# Patient Record
Sex: FEMALE | Race: WHITE | NOT HISPANIC OR LATINO | Employment: OTHER | URBAN - NONMETROPOLITAN AREA
[De-identification: names, ages, dates, MRNs, and addresses within clinical notes are randomized per-mention and may not be internally consistent; named-entity substitution may affect disease eponyms.]

---

## 2017-02-02 RX ORDER — TRAMADOL HYDROCHLORIDE 50 MG/1
50 TABLET ORAL 4 TIMES DAILY
Qty: 120 TABLET | Refills: 0 | Status: SHIPPED | OUTPATIENT
Start: 2017-02-02 | End: 2017-03-23 | Stop reason: SDUPTHER

## 2017-02-02 RX ORDER — ACETAMINOPHEN AND CODEINE PHOSPHATE 300; 30 MG/1; MG/1
1 TABLET ORAL 2 TIMES DAILY PRN
Qty: 60 TABLET | Refills: 0 | Status: SHIPPED | OUTPATIENT
Start: 2017-02-02 | End: 2017-03-23 | Stop reason: SDUPTHER

## 2017-02-06 RX ORDER — ESTRADIOL 0.5 MG/1
TABLET ORAL
Qty: 90 TABLET | Refills: 2 | Status: SHIPPED | OUTPATIENT
Start: 2017-02-06 | End: 2020-03-19

## 2017-02-06 RX ORDER — MONTELUKAST SODIUM 10 MG/1
TABLET ORAL
Qty: 90 TABLET | Refills: 2 | Status: SHIPPED | OUTPATIENT
Start: 2017-02-06 | End: 2017-09-29 | Stop reason: SDUPTHER

## 2017-02-06 RX ORDER — VALSARTAN AND HYDROCHLOROTHIAZIDE 160; 25 MG/1; MG/1
TABLET ORAL
Qty: 90 TABLET | Refills: 2 | Status: SHIPPED | OUTPATIENT
Start: 2017-02-06 | End: 2017-09-29 | Stop reason: SDUPTHER

## 2017-02-06 RX ORDER — ESOMEPRAZOLE MAGNESIUM 40 MG/1
CAPSULE, DELAYED RELEASE ORAL
Qty: 90 CAPSULE | Refills: 2 | Status: SHIPPED | OUTPATIENT
Start: 2017-02-06 | End: 2017-11-03 | Stop reason: SDUPTHER

## 2017-02-06 RX ORDER — LEVOCETIRIZINE DIHYDROCHLORIDE 5 MG/1
TABLET, FILM COATED ORAL
Qty: 90 TABLET | Refills: 2 | Status: SHIPPED | OUTPATIENT
Start: 2017-02-06 | End: 2017-11-03 | Stop reason: SDUPTHER

## 2017-02-07 ENCOUNTER — OFFICE VISIT (OUTPATIENT)
Dept: FAMILY MEDICINE CLINIC | Facility: CLINIC | Age: 72
End: 2017-02-07

## 2017-02-07 DIAGNOSIS — F32.89 OTHER DEPRESSION: ICD-10-CM

## 2017-02-07 DIAGNOSIS — G43.009 MIGRAINE WITHOUT AURA AND WITHOUT STATUS MIGRAINOSUS, NOT INTRACTABLE: ICD-10-CM

## 2017-02-07 DIAGNOSIS — K21.9 GASTROESOPHAGEAL REFLUX DISEASE WITHOUT ESOPHAGITIS: ICD-10-CM

## 2017-02-07 DIAGNOSIS — J45.909 EXTRINSIC ASTHMA WITHOUT COMPLICATION: ICD-10-CM

## 2017-02-07 DIAGNOSIS — M15.9 GENERALIZED OSTEOARTHRITIS: ICD-10-CM

## 2017-02-07 DIAGNOSIS — I10 ESSENTIAL HYPERTENSION: ICD-10-CM

## 2017-02-07 DIAGNOSIS — E78.2 MIXED HYPERLIPIDEMIA: Primary | ICD-10-CM

## 2017-02-07 DIAGNOSIS — E55.9 VITAMIN D DEFICIENCY: ICD-10-CM

## 2017-02-07 DIAGNOSIS — Z12.31 ENCOUNTER FOR SCREENING MAMMOGRAM FOR BREAST CANCER: ICD-10-CM

## 2017-02-07 DIAGNOSIS — M81.0 POSTMENOPAUSAL OSTEOPOROSIS: ICD-10-CM

## 2017-02-07 DIAGNOSIS — J30.9 CHRONIC ALLERGIC RHINITIS: ICD-10-CM

## 2017-02-07 DIAGNOSIS — Z00.00 HEALTHCARE MAINTENANCE: ICD-10-CM

## 2017-02-07 LAB
25(OH)D3 SERPL-MCNC: 22 NG/ML
ALBUMIN SERPL-MCNC: 4.1 G/DL (ref 3.4–4.8)
ALBUMIN/GLOB SERPL: 1.4 G/DL (ref 1.5–2.5)
ALP SERPL-CCNC: 84 U/L (ref 46–116)
ALT SERPL W P-5'-P-CCNC: 12 U/L (ref 10–36)
ANION GAP SERPL CALCULATED.3IONS-SCNC: 6.9 MMOL/L (ref 3.6–11.2)
AST SERPL-CCNC: 16 U/L (ref 10–30)
BASOPHILS # BLD AUTO: 0.04 10*3/MM3 (ref 0–0.3)
BASOPHILS NFR BLD AUTO: 0.6 % (ref 0–2)
BILIRUB SERPL-MCNC: 0.6 MG/DL (ref 0.2–1.8)
BUN BLD-MCNC: 15 MG/DL (ref 7–21)
BUN/CREAT SERPL: 14.2 (ref 7–25)
CALCIUM SPEC-SCNC: 9.6 MG/DL (ref 7.7–10)
CHLORIDE SERPL-SCNC: 109 MMOL/L (ref 99–112)
CHOLEST SERPL-MCNC: 208 MG/DL (ref 0–200)
CO2 SERPL-SCNC: 27.1 MMOL/L (ref 24.3–31.9)
CREAT BLD-MCNC: 1.06 MG/DL (ref 0.43–1.29)
DEPRECATED RDW RBC AUTO: 43 FL (ref 37–54)
EOSINOPHIL # BLD AUTO: 0.34 10*3/MM3 (ref 0–0.7)
EOSINOPHIL NFR BLD AUTO: 5.2 % (ref 0–7)
ERYTHROCYTE [DISTWIDTH] IN BLOOD BY AUTOMATED COUNT: 13.3 % (ref 11.5–14.5)
GFR SERPL CREATININE-BSD FRML MDRD: 51 ML/MIN/1.73
GLOBULIN UR ELPH-MCNC: 2.9 GM/DL
GLUCOSE BLD-MCNC: 101 MG/DL (ref 70–110)
HCT VFR BLD AUTO: 40.8 % (ref 37–47)
HDLC SERPL-MCNC: 68 MG/DL (ref 60–100)
HGB BLD-MCNC: 13.1 G/DL (ref 12–16)
IMM GRANULOCYTES # BLD: 0.01 10*3/MM3 (ref 0–0.03)
IMM GRANULOCYTES NFR BLD: 0.2 % (ref 0–0.5)
LDLC SERPL CALC-MCNC: 123 MG/DL (ref 0–100)
LDLC/HDLC SERPL: 1.81 {RATIO}
LYMPHOCYTES # BLD AUTO: 1.82 10*3/MM3 (ref 1–3)
LYMPHOCYTES NFR BLD AUTO: 27.6 % (ref 16–46)
MCH RBC QN AUTO: 28.9 PG (ref 27–33)
MCHC RBC AUTO-ENTMCNC: 32.1 G/DL (ref 33–37)
MCV RBC AUTO: 90.1 FL (ref 80–94)
MONOCYTES # BLD AUTO: 0.44 10*3/MM3 (ref 0.1–0.9)
MONOCYTES NFR BLD AUTO: 6.7 % (ref 0–12)
NEUTROPHILS # BLD AUTO: 3.94 10*3/MM3 (ref 1.4–6.5)
NEUTROPHILS NFR BLD AUTO: 59.7 % (ref 40–75)
OSMOLALITY SERPL CALC.SUM OF ELEC: 285.9 MOSM/KG (ref 273–305)
PLATELET # BLD AUTO: 237 10*3/MM3 (ref 130–400)
PMV BLD AUTO: 10.5 FL (ref 6–10)
POTASSIUM BLD-SCNC: 3.5 MMOL/L (ref 3.5–5.3)
PROT SERPL-MCNC: 7 G/DL (ref 6–8)
RBC # BLD AUTO: 4.53 10*6/MM3 (ref 4.2–5.4)
SODIUM BLD-SCNC: 143 MMOL/L (ref 135–153)
TRIGL SERPL-MCNC: 84 MG/DL (ref 0–150)
TSH SERPL DL<=0.05 MIU/L-ACNC: 1.84 MIU/ML (ref 0.55–4.78)
VLDLC SERPL-MCNC: 16.8 MG/DL
WBC NRBC COR # BLD: 6.59 10*3/MM3 (ref 4.5–12.5)

## 2017-02-07 PROCEDURE — 82306 VITAMIN D 25 HYDROXY: CPT | Performed by: GENERAL PRACTICE

## 2017-02-07 PROCEDURE — 36415 COLL VENOUS BLD VENIPUNCTURE: CPT

## 2017-02-07 PROCEDURE — 84443 ASSAY THYROID STIM HORMONE: CPT | Performed by: GENERAL PRACTICE

## 2017-02-07 PROCEDURE — 80061 LIPID PANEL: CPT | Performed by: GENERAL PRACTICE

## 2017-02-07 PROCEDURE — 99214 OFFICE O/P EST MOD 30 MIN: CPT | Performed by: GENERAL PRACTICE

## 2017-02-07 PROCEDURE — 80053 COMPREHEN METABOLIC PANEL: CPT | Performed by: GENERAL PRACTICE

## 2017-02-07 PROCEDURE — 85025 COMPLETE CBC W/AUTO DIFF WBC: CPT | Performed by: GENERAL PRACTICE

## 2017-02-07 NOTE — PROGRESS NOTES
Subjective   Farzana Hawk is a 71 y.o. female.     History of Present Illness     Asthma  Patient's symptoms include dyspnea, non-productive cough and wheezing. She denies any chest pain and hemoptysis. The patient has been suffering from these symptoms for a number of years. Symptoms have been unchanged since last here. Medications used in the past to treat these symptoms include beta agonist inhalers, combination beta agonists/steroid inhalers and leukotriene antagonists. Suspected precipitants include upper respiratory infection and allergens. Patient is awoken from sleep approximately none times per night. Patient has not required emergency room treatment for these symptoms, and has not required hospitalization. The patient has not been intubated in the past.     Hypertension  Home blood pressure readings: are consistently at goal. Associated signs and symptoms: dyspnea. Patient denies: chest pain, palpitations, orthopnea, paroxysmal nocturnal dyspnea and peripheral edema. Current antihypertensive medications includes valsartan and hydrochlorothiazide. Medication compliance: taking as prescribed.  She has not had any recent lab work    Headache  Patient presents for evaluation of headache. Symptoms began about many years ago. Generally, the headaches last several hours and occur once per week. The headaches do not seem to be related to any time of the day. The headaches are usually pounding and are located in the forehead bilaterally.  The patient rates her most severe headaches a 10 on a scale from 1 to 10. Recently, the headaches have been stable. Work attendance or other daily activities are affected by the headaches. Precipitating factors include: stress. The headaches are usually not preceded by an aura. The patient denies dizziness, loss of balance, muscle weakness, numbness of extremities, speech difficulties and vision problems. Home treatment has included acetaminophen and codeine with some  improvement. Other history includes: migraine headaches diagnosed in the past. Family history includes migraine.     The following portions of the patient's history were reviewed and updated as appropriate: allergies, current medications, past family history, past medical history, past social history, past surgical history and problem list.    Review of Systems   Constitutional: Positive for fatigue. Negative for appetite change, chills, fever and unexpected weight change.   HENT: Positive for rhinorrhea. Negative for congestion, ear pain, sneezing, sore throat and voice change.    Eyes: Negative for visual disturbance.   Respiratory: Positive for cough, shortness of breath and wheezing.    Cardiovascular: Negative for chest pain, palpitations and leg swelling.   Gastrointestinal: Negative for abdominal pain, blood in stool, constipation, diarrhea, nausea and vomiting.   Endocrine: Negative for polydipsia.   Genitourinary: Negative for difficulty urinating, dysuria, frequency, hematuria, menstrual problem, pelvic pain, urgency, vaginal bleeding and vaginal discharge.   Musculoskeletal: Positive for arthralgias and back pain. Negative for joint swelling, myalgias and neck pain.   Skin: Negative for color change.   Neurological: Positive for headaches. Negative for tremors, weakness and numbness.   Psychiatric/Behavioral: Positive for dysphoric mood and sleep disturbance. Negative for suicidal ideas. The patient is nervous/anxious.        Objective   Physical Exam   Constitutional: She is oriented to person, place, and time. No distress.   Bright and in fair spirits. No apparent distress. No pallor, jaundice, diaphoresis, or cyanosis.     HENT:   Head: Atraumatic.   Right Ear: Tympanic membrane, external ear and ear canal normal.   Left Ear: Tympanic membrane, external ear and ear canal normal.   Nose: Nose normal.   Mouth/Throat: Oropharynx is clear and moist. Mucous membranes are not pale and not cyanotic.   Eyes:  EOM are normal. Pupils are equal, round, and reactive to light. No scleral icterus.   Neck: No JVD present. Carotid bruit is not present. No tracheal deviation present. No thyromegaly present.   Cardiovascular: Normal rate, regular rhythm, S1 normal, S2 normal and intact distal pulses.  Exam reveals no gallop, no S3 and no S4.    No murmur heard.  Pulmonary/Chest: Breath sounds normal. No stridor. No respiratory distress.   Abdominal: Soft. Normal aorta and bowel sounds are normal. She exhibits no distension, no abdominal bruit and no mass. There is no hepatosplenomegaly. There is no tenderness. No hernia.   Musculoskeletal: She exhibits no tenderness or deformity.       Vascular Status -  Her exam exhibits right foot vasculature normal. Her exam exhibits no right foot edema. Her exam exhibits left foot vasculature normal. Her exam exhibits no left foot edema.  Lymphadenopathy:        Head (right side): No submandibular adenopathy present.        Head (left side): No submandibular adenopathy present.     She has no cervical adenopathy.   Neurological: She is alert and oriented to person, place, and time. She has normal reflexes. She displays normal reflexes. No cranial nerve deficit. She exhibits normal muscle tone. Coordination normal.   Skin: Skin is warm and dry. No rash noted. She is not diaphoretic. No cyanosis. No pallor. Nails show no clubbing.   Psychiatric: She has a normal mood and affect.     Assessment/Plan   Problems Addressed this Visit        Cardiovascular and Mediastinum    Mixed hyperlipidemia - Primary  Encouraged to continue to work on her diet and excise plan   Fasting labs scheduled    Relevant Orders    Lipid Panel (Completed)    Essential hypertension  Hypertension: at goal. Evidence of target organ damage: none.  Encouraged to continue to work on diet and exercise plan.   Continue current medication    Relevant Orders    CBC & Differential (Completed)    Comprehensive Metabolic Panel  (Completed)    CBC Auto Differential (Completed)    Osmolality, Calculated (Completed)    Migraine without aura and without status migrainosus, not intractable  Migraine without aura.  Additional workup: None.  Headache diary: no  Lifestyle changes: stress reduction, sleep hygiene   Abortive medications to use in the future: acetaminophen, codeine  Prophylactic medications: topiramate  Encouraged to report if symptoms worsen or fail to respond to treatment plan as outlined.       Respiratory    Extrinsic asthma without complication  Mild persistent asthma. The patient is not currently having an exacerbation. In general, the patient's disease is well controlled.  Discussed distinction between quick-relief and controlled medications.  Discussed monitoring symptoms and use of quick-relief medications and contacting us early in the course of exacerbations.    Chronic allergic rhinitis       Digestive    Gastroesophageal reflux disease without esophagitis    Vitamin D deficiency    Relevant Orders    Vitamin D 25 Hydroxy (Completed)       Musculoskeletal and Integument    Generalized osteoarthritis    Postmenopausal osteoporosis  Scheduled for an updated DEXA scan at the time of her mammogram     Relevant Orders    DEXA Bone Density Axial       Other    Depression    Relevant Orders    TSH (Completed) fasting lab work scheduled     Healthcare maintenance  Reminded of the potential benefits and risks of a screening colonoscopy.  Patient would like to consider further     Encounter for screening mammogram for breast cancer  Scheduled for an updated mammogram     Relevant Orders    Mammo Screening Digital Tomosynthesis Bilateral With CAD

## 2017-02-08 VITALS
TEMPERATURE: 98.7 F | HEIGHT: 67 IN | BODY MASS INDEX: 28.41 KG/M2 | OXYGEN SATURATION: 98 % | HEART RATE: 93 BPM | WEIGHT: 181 LBS | DIASTOLIC BLOOD PRESSURE: 80 MMHG | SYSTOLIC BLOOD PRESSURE: 125 MMHG | RESPIRATION RATE: 12 BRPM

## 2017-02-16 ENCOUNTER — HOSPITAL ENCOUNTER (OUTPATIENT)
Dept: MAMMOGRAPHY | Facility: HOSPITAL | Age: 72
Discharge: HOME | End: 2017-02-16

## 2017-02-16 ENCOUNTER — HOSPITAL ENCOUNTER (OUTPATIENT)
Dept: BONE DENSITY | Facility: HOSPITAL | Age: 72
End: 2017-02-16

## 2017-02-22 ENCOUNTER — HOSPITAL ENCOUNTER (OUTPATIENT)
Dept: BONE DENSITY | Facility: HOSPITAL | Age: 72
Discharge: HOME | End: 2017-02-22

## 2017-02-22 ENCOUNTER — HOSPITAL ENCOUNTER (OUTPATIENT)
Dept: MAMMOGRAPHY | Facility: HOSPITAL | Age: 72
Discharge: HOME | End: 2017-02-22
Admitting: GENERAL PRACTICE

## 2017-02-22 DIAGNOSIS — M81.0 POSTMENOPAUSAL OSTEOPOROSIS: ICD-10-CM

## 2017-02-22 DIAGNOSIS — Z12.31 ENCOUNTER FOR SCREENING MAMMOGRAM FOR BREAST CANCER: ICD-10-CM

## 2017-02-22 PROCEDURE — 77080 DXA BONE DENSITY AXIAL: CPT | Performed by: RADIOLOGY

## 2017-02-22 PROCEDURE — 77063 BREAST TOMOSYNTHESIS BI: CPT

## 2017-02-22 PROCEDURE — G0202 SCR MAMMO BI INCL CAD: HCPCS | Performed by: RADIOLOGY

## 2017-02-22 PROCEDURE — 77063 BREAST TOMOSYNTHESIS BI: CPT | Performed by: RADIOLOGY

## 2017-02-22 PROCEDURE — 77080 DXA BONE DENSITY AXIAL: CPT

## 2017-02-22 PROCEDURE — G0202 SCR MAMMO BI INCL CAD: HCPCS

## 2017-02-27 DIAGNOSIS — Z23 ENCOUNTER FOR IMMUNIZATION: Primary | ICD-10-CM

## 2017-02-28 ENCOUNTER — CLINICAL SUPPORT (OUTPATIENT)
Dept: FAMILY MEDICINE CLINIC | Facility: CLINIC | Age: 72
End: 2017-02-28

## 2017-02-28 DIAGNOSIS — Z23 ENCOUNTER FOR IMMUNIZATION: Primary | ICD-10-CM

## 2017-02-28 PROCEDURE — 90715 TDAP VACCINE 7 YRS/> IM: CPT | Performed by: GENERAL PRACTICE

## 2017-02-28 PROCEDURE — 90471 IMMUNIZATION ADMIN: CPT | Performed by: GENERAL PRACTICE

## 2017-03-23 RX ORDER — ACETAMINOPHEN AND CODEINE PHOSPHATE 300; 30 MG/1; MG/1
1 TABLET ORAL 2 TIMES DAILY PRN
Qty: 60 TABLET | Refills: 0 | Status: SHIPPED | OUTPATIENT
Start: 2017-03-23 | End: 2017-04-25 | Stop reason: SDUPTHER

## 2017-03-23 RX ORDER — TRAMADOL HYDROCHLORIDE 50 MG/1
50 TABLET ORAL 4 TIMES DAILY
Qty: 120 TABLET | Refills: 0 | Status: SHIPPED | OUTPATIENT
Start: 2017-03-23 | End: 2017-04-25 | Stop reason: SDUPTHER

## 2017-04-25 RX ORDER — ACETAMINOPHEN AND CODEINE PHOSPHATE 300; 30 MG/1; MG/1
1 TABLET ORAL 2 TIMES DAILY PRN
Qty: 60 TABLET | Refills: 2 | Status: SHIPPED | OUTPATIENT
Start: 2017-04-25 | End: 2017-11-10 | Stop reason: SDUPTHER

## 2017-04-25 RX ORDER — TRAMADOL HYDROCHLORIDE 50 MG/1
50 TABLET ORAL 4 TIMES DAILY
Qty: 120 TABLET | Refills: 2 | Status: SHIPPED | OUTPATIENT
Start: 2017-04-25 | End: 2017-07-27 | Stop reason: SDUPTHER

## 2017-07-27 RX ORDER — TRAMADOL HYDROCHLORIDE 50 MG/1
50 TABLET ORAL 4 TIMES DAILY
Qty: 120 TABLET | Refills: 2 | Status: SHIPPED | OUTPATIENT
Start: 2017-07-27 | End: 2017-10-30 | Stop reason: SDUPTHER

## 2017-08-08 ENCOUNTER — OFFICE VISIT (OUTPATIENT)
Dept: FAMILY MEDICINE CLINIC | Facility: CLINIC | Age: 72
End: 2017-08-08

## 2017-08-08 DIAGNOSIS — J30.9 CHRONIC ALLERGIC RHINITIS: ICD-10-CM

## 2017-08-08 DIAGNOSIS — E55.9 VITAMIN D DEFICIENCY: ICD-10-CM

## 2017-08-08 DIAGNOSIS — J45.909 EXTRINSIC ASTHMA WITHOUT COMPLICATION: ICD-10-CM

## 2017-08-08 DIAGNOSIS — G43.009 MIGRAINE WITHOUT AURA AND WITHOUT STATUS MIGRAINOSUS, NOT INTRACTABLE: ICD-10-CM

## 2017-08-08 DIAGNOSIS — E78.2 MIXED HYPERLIPIDEMIA: Primary | ICD-10-CM

## 2017-08-08 DIAGNOSIS — M81.0 POSTMENOPAUSAL OSTEOPOROSIS: ICD-10-CM

## 2017-08-08 DIAGNOSIS — R07.2 PRECORDIAL PAIN: ICD-10-CM

## 2017-08-08 DIAGNOSIS — Z00.00 HEALTHCARE MAINTENANCE: ICD-10-CM

## 2017-08-08 DIAGNOSIS — K21.9 GASTROESOPHAGEAL REFLUX DISEASE WITHOUT ESOPHAGITIS: ICD-10-CM

## 2017-08-08 DIAGNOSIS — I10 ESSENTIAL HYPERTENSION: ICD-10-CM

## 2017-08-08 DIAGNOSIS — M15.9 GENERALIZED OSTEOARTHRITIS: ICD-10-CM

## 2017-08-08 DIAGNOSIS — F33.41 RECURRENT MAJOR DEPRESSIVE DISORDER, IN PARTIAL REMISSION (HCC): ICD-10-CM

## 2017-08-08 PROBLEM — R07.9 CHEST PAIN: Status: ACTIVE | Noted: 2017-08-08

## 2017-08-08 PROCEDURE — 99214 OFFICE O/P EST MOD 30 MIN: CPT | Performed by: GENERAL PRACTICE

## 2017-08-08 RX ORDER — ERGOCALCIFEROL 1.25 MG/1
50000 CAPSULE ORAL WEEKLY
Qty: 4 CAPSULE | Refills: 5 | Status: SHIPPED | OUTPATIENT
Start: 2017-08-08 | End: 2018-02-15 | Stop reason: SDUPTHER

## 2017-08-08 NOTE — PROGRESS NOTES
Subjective   Farzana Hawk is a 72 y.o. female.     History of Present Illness     Chest Pain  She has had 2 episodes of chest pain within the last month, the most recent approximately 2 weeks ago. Each occurred in the evening while at rest in bed and lasted about 3-5 minutes. She describes the pain as an anterior pressure. This did not radiate elsewhere but was associated with mild shortness of breath, diaphoresis, and nausea. She has been unable to identify and precipitating, aggravating, or relieving factors. She has been active over this period and denies any such symptoms with exertion. She denies any palpitations, orthopnea, PND or swelling of the ankles. Despite esomeprazole she continues to have intermittent heartburn described as a burning epigastric and retrosternal burning most frequently following meals and when she lies down at night. She denies any difficulty swallowing and has had no vomiting.      Asthma  Patient's symptoms include dyspnea, non-productive cough and wheezing. She denies any hemoptysis. The patient has been suffering from these symptoms for a number of years. Symptoms have otherwise been unchanged since last here. Medications used in the past to treat these symptoms include beta agonist inhalers, combination beta agonists/steroid inhalers and leukotriene antagonists. Suspected precipitants include upper respiratory infection and allergens.     Headache  Patient presents for evaluation of headache. Symptoms began many years ago. Generally, the headaches last several hours and occur once per week on average. The headaches do not seem to be related to any time of the day. The headaches are usually pounding and are located in the forehead bilaterally.  The patient rates her most severe headaches a 10 on a scale from 1 to 10. Recently, the headaches have been stable. Work attendance or other daily activities are affected by the headaches. Precipitating factors include: stress. The  headaches are usually not preceded by an aura. The patient denies dizziness, loss of balance, muscle weakness, numbness of extremities, speech difficulties and vision problems. Home treatment has included acetaminophen and codeine with some improvement. Other history includes: migraine headaches diagnosed in the past. Family history includes migraine.    Hypertension  Home blood pressure readings: are consistently at goal. Associated signs and symptoms: chest pain and dyspnea. Patient denies: palpitations, orthopnea, paroxysmal nocturnal dyspnea and peripheral edema. Current antihypertensive medications includes valsartan and hydrochlorothiazide. Medication compliance: taking as prescribed. Most recent creatinine   Lab Results   Component Value Date    CREATININE 1.06 02/07/2017     Labs  Most recent . , HDL 68, and TG 84. Vitamin D 22    The following portions of the patient's history were reviewed and updated as appropriate: allergies, current medications, past medical history, past social history and problem list.    Review of Systems   Constitutional: Positive for fatigue. Negative for appetite change, chills, fever and unexpected weight change.   HENT: Positive for rhinorrhea. Negative for congestion, ear pain, sneezing, sore throat and voice change.    Eyes: Negative for visual disturbance.   Respiratory: Positive for cough, shortness of breath and wheezing.    Cardiovascular: Positive for chest pain. Negative for palpitations and leg swelling.   Gastrointestinal: Negative for abdominal pain, blood in stool, constipation, diarrhea, nausea and vomiting.   Endocrine: Negative for polydipsia.   Genitourinary: Negative for difficulty urinating, dysuria, frequency, hematuria, menstrual problem, pelvic pain, urgency, vaginal bleeding and vaginal discharge.   Musculoskeletal: Positive for arthralgias and back pain. Negative for joint swelling, myalgias and neck pain.   Skin: Negative for color change.    Neurological: Positive for headaches. Negative for tremors, weakness and numbness.   Psychiatric/Behavioral: Positive for dysphoric mood and sleep disturbance. Negative for suicidal ideas. The patient is nervous/anxious.      Objective   Physical Exam   Constitutional: She is oriented to person, place, and time. No distress.   Bright and in fair spirits. No apparent distress. No pallor, jaundice, diaphoresis, or cyanosis.     HENT:   Head: Atraumatic.   Right Ear: Tympanic membrane, external ear and ear canal normal.   Left Ear: Tympanic membrane, external ear and ear canal normal.   Nose: Nose normal.   Mouth/Throat: Oropharynx is clear and moist. Mucous membranes are not pale and not cyanotic.   Eyes: EOM are normal. Pupils are equal, round, and reactive to light. No scleral icterus.   Neck: No JVD present. Carotid bruit is not present. No tracheal deviation present. No thyromegaly present.   Cardiovascular: Normal rate, regular rhythm, S1 normal, S2 normal and intact distal pulses.  Exam reveals no gallop, no S3 and no S4.    No murmur heard.  Pulmonary/Chest: Breath sounds normal. No stridor. No respiratory distress.   Abdominal: Soft. Normal aorta and bowel sounds are normal. She exhibits no distension, no abdominal bruit and no mass. There is no hepatosplenomegaly. There is no tenderness. No hernia.   Musculoskeletal: She exhibits no tenderness or deformity.       Vascular Status -  Her exam exhibits right foot vasculature normal. Her exam exhibits no right foot edema. Her exam exhibits left foot vasculature normal. Her exam exhibits no left foot edema.  Lymphadenopathy:        Head (right side): No submandibular adenopathy present.        Head (left side): No submandibular adenopathy present.     She has no cervical adenopathy.   Neurological: She is alert and oriented to person, place, and time. She has normal reflexes. She displays normal reflexes. No cranial nerve deficit. She exhibits normal muscle  tone. Coordination normal.   Skin: Skin is warm and dry. No rash noted. She is not diaphoretic. No cyanosis. No pallor. Nails show no clubbing.   Psychiatric: She has a normal mood and affect.     Assessment/Plan   Problems Addressed this Visit        Cardiovascular and Mediastinum    Mixed hyperlipidemia   Encouraged to continue to work on her diet and exercise plan.    Essential hypertension  Hypertension: at goal. Evidence of target organ damage: none.  Continue current medication    Migraine without aura and without status migrainosus, not intractable    Relevant Medications    aspirin 81 MG tablet       Respiratory    Extrinsic asthma without complication  Mild persistent asthma. The patient is not currently having an exacerbation. In general, the patient's disease is well controlled.  Discussed distinction between quick-relief and controlled medications.  Discussed monitoring symptoms and use of quick-relief medications and contacting us early in the course of exacerbations.    Chronic allergic rhinitis       Digestive    Gastroesophageal reflux disease without esophagitis    Vitamin D deficiency    Relevant Medications    vitamin D (ERGOCALCIFEROL) 73294 UNITS capsule capsule       Nervous and Auditory    Chest pain  Somewhat atypical however has a number of CRFs.   Discussed options going forward and agreed on a GXT. Patient will be notified of the results. She will seek immediate attention if any worse or if any new associated symptoms in the meantime  Will start on ASA in the meantime    Relevant Medications    aspirin 81 MG tablet    Other Relevant Orders    Stress Test With Myocardial Perfusion One Day       Musculoskeletal and Integument    Generalized osteoarthritis    Postmenopausal osteoporosis       Other    Recurrent major depressive disorder, in partial remission  Significant situational component. Supportive therapy. Will continue current medication.    Healthcare maintenance  Patient remains  uninterested in a screening colonoscopy  Reminded to get a flu shot when available

## 2017-08-09 VITALS
BODY MASS INDEX: 27.94 KG/M2 | HEIGHT: 67 IN | WEIGHT: 178 LBS | SYSTOLIC BLOOD PRESSURE: 125 MMHG | TEMPERATURE: 98 F | OXYGEN SATURATION: 96 % | HEART RATE: 72 BPM | DIASTOLIC BLOOD PRESSURE: 80 MMHG | RESPIRATION RATE: 12 BRPM

## 2017-08-22 ENCOUNTER — HOSPITAL ENCOUNTER (OUTPATIENT)
Dept: NUCLEAR MEDICINE | Facility: HOSPITAL | Age: 72
Discharge: HOME | End: 2017-08-22

## 2017-08-22 ENCOUNTER — HOSPITAL ENCOUNTER (OUTPATIENT)
Dept: CARDIOLOGY | Facility: HOSPITAL | Age: 72
Discharge: HOME | End: 2017-08-22

## 2017-08-22 DIAGNOSIS — R07.2 PRECORDIAL PAIN: ICD-10-CM

## 2017-08-22 LAB
BH CV NUCLEAR PRIOR STUDY: 3
BH CV STRESS BP STAGE 1: NORMAL
BH CV STRESS BP STAGE 2: NORMAL
BH CV STRESS COMMENTS STAGE 1: NORMAL
BH CV STRESS COMMENTS STAGE 2: NORMAL
BH CV STRESS DOSE REGADENOSON STAGE 1: 0.4
BH CV STRESS DURATION MIN STAGE 1: 0
BH CV STRESS DURATION MIN STAGE 2: 4
BH CV STRESS DURATION SEC STAGE 1: 15
BH CV STRESS DURATION SEC STAGE 2: 0
BH CV STRESS HR STAGE 1: 82
BH CV STRESS HR STAGE 2: 67
BH CV STRESS PROTOCOL 1: NORMAL
BH CV STRESS RECOVERY BP: NORMAL MMHG
BH CV STRESS RECOVERY HR: 67 BPM
BH CV STRESS STAGE 1: 1
BH CV STRESS STAGE 2: 2
MAXIMAL PREDICTED HEART RATE: 148 BPM
PERCENT MAX PREDICTED HR: 55.41 %
STRESS BASELINE BP: NORMAL MMHG
STRESS BASELINE HR: 67 BPM
STRESS PERCENT HR: 65 %
STRESS POST PEAK BP: NORMAL MMHG
STRESS POST PEAK HR: 82 BPM
STRESS TARGET HR: 126 BPM

## 2017-08-22 PROCEDURE — 93018 CV STRESS TEST I&R ONLY: CPT | Performed by: INTERNAL MEDICINE

## 2017-08-22 PROCEDURE — 93017 CV STRESS TEST TRACING ONLY: CPT

## 2017-08-22 PROCEDURE — A9500 TC99M SESTAMIBI: HCPCS | Performed by: GENERAL PRACTICE

## 2017-08-22 PROCEDURE — 25010000002 REGADENOSON 0.4 MG/5ML SOLUTION: Performed by: GENERAL PRACTICE

## 2017-08-22 PROCEDURE — 25010000002 AMINOPHYLLINE PER 250 MG: Performed by: GENERAL PRACTICE

## 2017-08-22 PROCEDURE — 0 TECHNETIUM SESTAMIBI: Performed by: GENERAL PRACTICE

## 2017-08-22 PROCEDURE — 78452 HT MUSCLE IMAGE SPECT MULT: CPT

## 2017-08-22 PROCEDURE — 78452 HT MUSCLE IMAGE SPECT MULT: CPT | Performed by: INTERNAL MEDICINE

## 2017-08-22 RX ORDER — AMINOPHYLLINE DIHYDRATE 25 MG/ML
125 INJECTION, SOLUTION INTRAVENOUS
Status: COMPLETED | OUTPATIENT
Start: 2017-08-22 | End: 2017-08-22

## 2017-08-22 RX ADMIN — AMINOPHYLLINE 125 MG: 25 INJECTION, SOLUTION INTRAVENOUS at 10:06

## 2017-08-22 RX ADMIN — REGADENOSON 0.4 MG: 0.08 INJECTION, SOLUTION INTRAVENOUS at 10:02

## 2017-08-22 RX ADMIN — TECHNETIUM TC-99M SESTAMIBI 1 DOSE: 1 INJECTION INTRAVENOUS at 10:02

## 2017-08-22 RX ADMIN — TECHNETIUM TC-99M SESTAMIBI 1 DOSE: 1 INJECTION INTRAVENOUS at 08:10

## 2017-08-24 NOTE — PROGRESS NOTES
Left message for pt to let her know that her stress test was normal. VH    -- Please let mrs fallon know that her stress test was normal

## 2017-10-02 RX ORDER — MONTELUKAST SODIUM 10 MG/1
TABLET ORAL
Qty: 90 TABLET | Refills: 2 | Status: SHIPPED | OUTPATIENT
Start: 2017-10-02 | End: 2018-06-27 | Stop reason: SDUPTHER

## 2017-10-02 RX ORDER — VALSARTAN AND HYDROCHLOROTHIAZIDE 160; 25 MG/1; MG/1
TABLET ORAL
Qty: 90 TABLET | Refills: 2 | Status: SHIPPED | OUTPATIENT
Start: 2017-10-02 | End: 2018-06-27 | Stop reason: SDUPTHER

## 2017-10-30 RX ORDER — TRAMADOL HYDROCHLORIDE 50 MG/1
50 TABLET ORAL 4 TIMES DAILY
Qty: 120 TABLET | Refills: 2 | Status: SHIPPED | OUTPATIENT
Start: 2017-10-30 | End: 2017-11-10 | Stop reason: SDUPTHER

## 2017-11-03 RX ORDER — ESOMEPRAZOLE MAGNESIUM 40 MG/1
CAPSULE, DELAYED RELEASE ORAL
Qty: 90 CAPSULE | Refills: 2 | Status: SHIPPED | OUTPATIENT
Start: 2017-11-03 | End: 2018-07-31 | Stop reason: SDUPTHER

## 2017-11-03 RX ORDER — LEVOCETIRIZINE DIHYDROCHLORIDE 5 MG/1
TABLET, FILM COATED ORAL
Qty: 90 TABLET | Refills: 2 | Status: SHIPPED | OUTPATIENT
Start: 2017-11-03 | End: 2018-07-12 | Stop reason: SDUPTHER

## 2017-11-10 ENCOUNTER — OFFICE VISIT (OUTPATIENT)
Dept: FAMILY MEDICINE CLINIC | Facility: CLINIC | Age: 72
End: 2017-11-10

## 2017-11-10 DIAGNOSIS — M15.9 GENERALIZED OSTEOARTHRITIS: ICD-10-CM

## 2017-11-10 DIAGNOSIS — E78.2 MIXED HYPERLIPIDEMIA: Primary | ICD-10-CM

## 2017-11-10 DIAGNOSIS — J45.30 MILD PERSISTENT EXTRINSIC ASTHMA WITHOUT COMPLICATION: ICD-10-CM

## 2017-11-10 DIAGNOSIS — K21.9 GASTROESOPHAGEAL REFLUX DISEASE WITHOUT ESOPHAGITIS: ICD-10-CM

## 2017-11-10 DIAGNOSIS — Z00.00 HEALTHCARE MAINTENANCE: ICD-10-CM

## 2017-11-10 DIAGNOSIS — E55.9 VITAMIN D DEFICIENCY: ICD-10-CM

## 2017-11-10 DIAGNOSIS — F33.41 RECURRENT MAJOR DEPRESSIVE DISORDER, IN PARTIAL REMISSION (HCC): ICD-10-CM

## 2017-11-10 DIAGNOSIS — J30.2 CHRONIC SEASONAL ALLERGIC RHINITIS, UNSPECIFIED TRIGGER: ICD-10-CM

## 2017-11-10 DIAGNOSIS — M81.0 POSTMENOPAUSAL OSTEOPOROSIS: ICD-10-CM

## 2017-11-10 DIAGNOSIS — G43.009 MIGRAINE WITHOUT AURA AND WITHOUT STATUS MIGRAINOSUS, NOT INTRACTABLE: ICD-10-CM

## 2017-11-10 DIAGNOSIS — R07.9 CHEST PAIN, UNSPECIFIED TYPE: ICD-10-CM

## 2017-11-10 DIAGNOSIS — I10 ESSENTIAL HYPERTENSION: ICD-10-CM

## 2017-11-10 PROCEDURE — 99214 OFFICE O/P EST MOD 30 MIN: CPT | Performed by: GENERAL PRACTICE

## 2017-11-10 RX ORDER — ACETAMINOPHEN AND CODEINE PHOSPHATE 300; 30 MG/1; MG/1
1 TABLET ORAL 2 TIMES DAILY PRN
Qty: 60 TABLET | Refills: 2 | Status: SHIPPED | OUTPATIENT
Start: 2017-11-10 | End: 2018-02-15 | Stop reason: SDUPTHER

## 2017-11-10 RX ORDER — TRAMADOL HYDROCHLORIDE 50 MG/1
50 TABLET ORAL 4 TIMES DAILY
Qty: 120 TABLET | Refills: 2 | Status: SHIPPED | OUTPATIENT
Start: 2017-11-10 | End: 2018-02-15 | Stop reason: SDUPTHER

## 2017-11-10 NOTE — PROGRESS NOTES
Subjective   Farzana Hawk is a 72 y.o. female.     History of Present Illness     Chest Pain  She has continued to have intermittent chest pain since last here. Episodes have consistently occurred at night while at rest in bed and have generally lasted about 3-5 minutes. She describes the pain as an anterior pressure. This does not radiate elsewhere and as of late has been unassociated with any other symptoms.. She has been unable to identify and precipitating, aggravating, or relieving factors. She has been active over this period and denies any such symptoms with exertion. She denies any palpitations, change in her shortness of breath with activity, orthopnea, PND, lightheadedness, diaphoresis, or swelling of the ankles. Stress testing perfromed on 8/22/17 revealed no evidence of inducible ischemia/perfusion defect but despite this she and he  are quite concerned that she has underlying heart disease. She continues to have intermittent heartburn described as a burning epigastric and retrosternal burning most frequently following meals and when she lies down at night. She denies any difficulty swallowing and has had no vomiting. She is taking her esomeprazole in the morning    Asthma  Patient's symptoms include dyspnea, non-productive cough and wheezing. She denies any hemoptysis. The patient has been suffering from these symptoms for a number of years. Symptoms have been unchanged since last here. Medications used in the past to treat these symptoms include beta agonist inhalers, combination beta agonists/steroid inhalers and leukotriene antagonists. Suspected precipitants include upper respiratory infection and allergens.     Headache  Patient presents for evaluation of headache. Symptoms began many years ago. Generally, the headaches last several hours and occur once per week on average. The headaches do not seem to be related to any time of the day. The headaches are usually pounding and are located  in the forehead bilaterally.  The patient rates her most severe headaches a 10 on a scale from 1 to 10. Recently, the headaches have been stable. Work attendance or other daily activities are affected by the headaches. Precipitating factors include: stress. The headaches are usually not preceded by an aura. The patient denies dizziness, loss of balance, muscle weakness, numbness of extremities, speech difficulties and vision problems. Home treatment has included acetaminophen and codeine with some improvement. Other history includes: migraine headaches diagnosed in the past. Family history includes migraine.    Hypertension  Home blood pressure readings: are consistently at goal. Associated signs and symptoms: chest pain and dyspnea. Patient denies: palpitations, orthopnea, paroxysmal nocturnal dyspnea and peripheral edema. Current antihypertensive medications includes valsartan and hydrochlorothiazide. Medication compliance: taking as prescribed. She has had no recent labs    The following portions of the patient's history were reviewed and updated as appropriate: allergies, current medications, past medical history, past social history and problem list.    Review of Systems   Constitutional: Positive for fatigue. Negative for appetite change, chills, fever and unexpected weight change.   HENT: Positive for rhinorrhea. Negative for congestion, ear pain, sneezing, sore throat and voice change.    Eyes: Negative for visual disturbance.   Respiratory: Positive for cough, shortness of breath and wheezing.    Cardiovascular: Positive for chest pain. Negative for palpitations and leg swelling.   Gastrointestinal: Negative for abdominal pain, blood in stool, constipation, diarrhea, nausea and vomiting.        Intermittent heartburn   Endocrine: Negative for polydipsia.   Genitourinary: Negative for difficulty urinating, dysuria, frequency, hematuria, menstrual problem, pelvic pain, urgency, vaginal bleeding and vaginal  discharge.   Musculoskeletal: Positive for arthralgias and back pain. Negative for joint swelling, myalgias and neck pain.   Skin: Negative for color change.   Neurological: Positive for headaches. Negative for tremors, weakness and numbness.   Psychiatric/Behavioral: Positive for dysphoric mood and sleep disturbance. Negative for suicidal ideas. The patient is nervous/anxious.      Objective   Physical Exam   Constitutional: She is oriented to person, place, and time. No distress.   Bright and in fair spirits. No apparent distress. No pallor, jaundice, diaphoresis, or cyanosis.     HENT:   Head: Atraumatic.   Right Ear: Tympanic membrane, external ear and ear canal normal.   Left Ear: Tympanic membrane, external ear and ear canal normal.   Nose: Nose normal.   Mouth/Throat: Oropharynx is clear and moist. Mucous membranes are not pale and not cyanotic.   Eyes: EOM are normal. Pupils are equal, round, and reactive to light. No scleral icterus.   Neck: No JVD present. Carotid bruit is not present. No tracheal deviation present. No thyromegaly present.   Cardiovascular: Normal rate, regular rhythm, S1 normal, S2 normal and intact distal pulses.  Exam reveals no gallop, no S3 and no S4.    No murmur heard.  Pulmonary/Chest: Breath sounds normal. No stridor. No respiratory distress.   Abdominal: Soft. Normal aorta and bowel sounds are normal. She exhibits no distension, no abdominal bruit and no mass. There is no hepatosplenomegaly. There is no tenderness. No hernia.   Musculoskeletal: She exhibits no tenderness or deformity.       Vascular Status -  Her exam exhibits right foot vasculature normal. Her exam exhibits no right foot edema. Her exam exhibits left foot vasculature normal. Her exam exhibits no left foot edema.  Lymphadenopathy:        Head (right side): No submandibular adenopathy present.        Head (left side): No submandibular adenopathy present.     She has no cervical adenopathy.   Neurological: She is  alert and oriented to person, place, and time. She has normal reflexes. She displays normal reflexes. No cranial nerve deficit. She exhibits normal muscle tone. Coordination normal.   Skin: Skin is warm and dry. No rash noted. She is not diaphoretic. No cyanosis. No pallor. Nails show no clubbing.   Psychiatric: She has a normal mood and affect.     Assessment/Plan   Problems Addressed this Visit        Cardiovascular and Mediastinum    Mixed hyperlipidemia   Encouraged to continue to work on her diet and exercise plan.  Continue current medication  Fasting labs will be updated just prior to her return in 3 months    Relevant Orders    Lipid Panel    TSH    Essential hypertension  As above.   Continue current medication.    Relevant Orders    CBC & Differential    Comprehensive Metabolic Panel    Migraine without aura and without status migrainosus, not intractable    Relevant Medications    acetaminophen-codeine (TYLENOL #3) 300-30 MG per tablet    traMADol (ULTRAM) 50 MG tablet       Respiratory    Extrinsic asthma without complication  Mild persistent asthma. The patient is not currently having an exacerbation. In general, the patient's disease is well controlled.  Discussed monitoring symptoms and use of quick-relief medications and contacting us early in the course of exacerbations.    Chronic seasonal allergic rhinitis       Digestive    Gastroesophageal reflux disease without esophagitis  Lengthy discussion regarding lifestyle modification including the avoidance of eating/drinking in the evening as well as elevation of the head of the bed.   Recommended taking esomeprazole just before supper    Vitamin D deficiency  Will continue to monitor    Relevant Orders    Vitamin D 25 Hydroxy       Nervous and Auditory    Chest pain  Reviewed options going forward. Agreed on a CT angiogram if her insurance will approve this.        Musculoskeletal and Integument    Generalized osteoarthritis  Reminded regarding  symptomatic treatment.   Continue current medication    Relevant Medications    acetaminophen-codeine (TYLENOL #3) 300-30 MG per tablet    traMADol (ULTRAM) 50 MG tablet    Postmenopausal osteoporosis       Other    Recurrent major depressive disorder, in partial remission  Stable.   Supportive therapy.     Healthcare maintenance  Patient has already received a flu shot this fall  Hepatitis C screen will be performed with her next labs  Will discuss an updated mammogram at her return  Patient remains uninterested in a screening colonoscopy    Relevant Orders    Hepatitis C Antibody

## 2017-11-11 VITALS
HEART RATE: 81 BPM | SYSTOLIC BLOOD PRESSURE: 125 MMHG | DIASTOLIC BLOOD PRESSURE: 80 MMHG | TEMPERATURE: 96.6 F | WEIGHT: 175 LBS | RESPIRATION RATE: 12 BRPM | BODY MASS INDEX: 32.2 KG/M2 | HEIGHT: 62 IN | OXYGEN SATURATION: 96 %

## 2018-02-15 ENCOUNTER — OFFICE VISIT (OUTPATIENT)
Dept: FAMILY MEDICINE CLINIC | Facility: CLINIC | Age: 73
End: 2018-02-15

## 2018-02-15 VITALS
SYSTOLIC BLOOD PRESSURE: 130 MMHG | DIASTOLIC BLOOD PRESSURE: 80 MMHG | OXYGEN SATURATION: 98 % | WEIGHT: 178 LBS | BODY MASS INDEX: 32.76 KG/M2 | HEIGHT: 62 IN | HEART RATE: 75 BPM | RESPIRATION RATE: 12 BRPM | TEMPERATURE: 98.4 F

## 2018-02-15 DIAGNOSIS — J30.2 CHRONIC SEASONAL ALLERGIC RHINITIS, UNSPECIFIED TRIGGER: ICD-10-CM

## 2018-02-15 DIAGNOSIS — Z12.31 ENCOUNTER FOR SCREENING MAMMOGRAM FOR BREAST CANCER: ICD-10-CM

## 2018-02-15 DIAGNOSIS — Z00.00 HEALTHCARE MAINTENANCE: ICD-10-CM

## 2018-02-15 DIAGNOSIS — M15.9 GENERALIZED OSTEOARTHRITIS: ICD-10-CM

## 2018-02-15 DIAGNOSIS — J45.30 MILD PERSISTENT EXTRINSIC ASTHMA WITHOUT COMPLICATION: ICD-10-CM

## 2018-02-15 DIAGNOSIS — I10 ESSENTIAL HYPERTENSION: ICD-10-CM

## 2018-02-15 DIAGNOSIS — R07.89 OTHER CHEST PAIN: ICD-10-CM

## 2018-02-15 DIAGNOSIS — E78.2 MIXED HYPERLIPIDEMIA: Primary | ICD-10-CM

## 2018-02-15 DIAGNOSIS — M81.0 POSTMENOPAUSAL OSTEOPOROSIS: ICD-10-CM

## 2018-02-15 DIAGNOSIS — F33.41 RECURRENT MAJOR DEPRESSIVE DISORDER, IN PARTIAL REMISSION (HCC): ICD-10-CM

## 2018-02-15 DIAGNOSIS — E55.9 VITAMIN D DEFICIENCY: ICD-10-CM

## 2018-02-15 DIAGNOSIS — K21.9 GASTROESOPHAGEAL REFLUX DISEASE WITHOUT ESOPHAGITIS: ICD-10-CM

## 2018-02-15 DIAGNOSIS — G43.009 MIGRAINE WITHOUT AURA AND WITHOUT STATUS MIGRAINOSUS, NOT INTRACTABLE: ICD-10-CM

## 2018-02-15 PROCEDURE — 99214 OFFICE O/P EST MOD 30 MIN: CPT | Performed by: GENERAL PRACTICE

## 2018-02-15 RX ORDER — ACETAMINOPHEN AND CODEINE PHOSPHATE 300; 30 MG/1; MG/1
1 TABLET ORAL 2 TIMES DAILY PRN
Qty: 60 TABLET | Refills: 2 | Status: SHIPPED | OUTPATIENT
Start: 2018-02-15 | End: 2018-05-01 | Stop reason: SDUPTHER

## 2018-02-15 RX ORDER — TRAMADOL HYDROCHLORIDE 50 MG/1
50 TABLET ORAL 4 TIMES DAILY
Qty: 120 TABLET | Refills: 2 | Status: SHIPPED | OUTPATIENT
Start: 2018-02-15 | End: 2018-05-01 | Stop reason: SDUPTHER

## 2018-02-15 RX ORDER — ERGOCALCIFEROL 1.25 MG/1
50000 CAPSULE ORAL WEEKLY
Qty: 4 CAPSULE | Refills: 5 | Status: SHIPPED | OUTPATIENT
Start: 2018-02-15 | End: 2018-07-16 | Stop reason: SDUPTHER

## 2018-02-15 NOTE — PROGRESS NOTES
Subjective   Farzana Hawk is a 72 y.o. female.     History of Present Illness     Chest Pain  She has continued to have intermittent chest pain since last here. Episodes have consistently occurred at night while at rest in bed and have generally lasted about 3-5 minutes. She describes the pain as an anterior pressure. This does not radiate elsewhere and as of late has been unassociated with any other symptoms.. She has been unable to identify and precipitating, aggravating, or relieving factors. She has remained active over this period and denies any such symptoms with exertion. She denies any palpitations, change in her shortness of breath with activity, orthopnea, PND, lightheadedness, diaphoresis, or swelling of the ankles. Stress testing perfromed on 8/22/17 revealed no evidence of inducible ischemia/perfusion defect. She continues to have intermittent heartburn described as a burning epigastric and retrosternal burning most frequently following meals and when she lies down at night. She denies any difficulty swallowing and has had no vomiting. She continues to take her esomeprazole in the morning    Asthma  Patient's symptoms include dyspnea, non-productive cough and wheezing. She denies any hemoptysis. The patient has been suffering from these symptoms for a number of years. Symptoms have been unchanged since last here. Medications used in the past to treat these symptoms include beta agonist inhalers, combination beta agonists/steroid inhalers and leukotriene antagonists. Suspected precipitants include upper respiratory infection and allergens.     Headache  Patient presents for evaluation of headache. Symptoms began many years ago. Generally, the headaches last several hours and occur once per week on average. The headaches do not seem to be related to any time of the day. The headaches are usually pounding and are located in the forehead bilaterally.  The patient rates her most severe headaches a 10 on  a scale from 1 to 10. Recently, the headaches have been stable. Work attendance or other daily activities are affected by the headaches. Precipitating factors include: stress. The headaches are usually not preceded by an aura. The patient denies dizziness, loss of balance, muscle weakness, numbness of extremities, speech difficulties and vision problems. Home treatment has included acetaminophen and codeine with some improvement. Other history includes: migraine headaches diagnosed in the past. Family history includes migraine.    Hypertension  Home blood pressure readings: are consistently at goal. Associated signs and symptoms: chest pain and dyspnea. Patient denies: palpitations, orthopnea, paroxysmal nocturnal dyspnea and peripheral edema. Current antihypertensive medications includes valsartan and hydrochlorothiazide. Medication compliance: taking as prescribed.   Lab Results   Component Value Date    CREATININE 1.06 02/07/2017     Lab Results   Component Value Date    CHOL 208 (H) 02/07/2017    TRIG 84 02/07/2017    HDL 68 02/07/2017     (H) 02/07/2017     Labs  Most recent vitamin D 22.  She is on high-dose supplementation    The following portions of the patient's history were reviewed and updated as appropriate: allergies, current medications, past medical history, past social history and problem list.    Review of Systems   Constitutional: Positive for fatigue. Negative for appetite change, chills, fever and unexpected weight change.   HENT: Positive for rhinorrhea. Negative for congestion, ear pain, sneezing, sore throat and voice change.    Eyes: Negative for visual disturbance.   Respiratory: Positive for cough, shortness of breath and wheezing.    Cardiovascular: Positive for chest pain. Negative for palpitations and leg swelling.   Gastrointestinal: Negative for abdominal pain, blood in stool, constipation, diarrhea, nausea and vomiting.        Intermittent heartburn   Endocrine: Negative for  polydipsia.   Genitourinary: Negative for difficulty urinating, dysuria, frequency, hematuria, menstrual problem, pelvic pain, urgency, vaginal bleeding and vaginal discharge.   Musculoskeletal: Positive for arthralgias and back pain. Negative for joint swelling, myalgias and neck pain.   Skin: Negative for color change.   Neurological: Positive for headaches. Negative for tremors, weakness and numbness.   Psychiatric/Behavioral: Positive for dysphoric mood and sleep disturbance. Negative for suicidal ideas. The patient is nervous/anxious.      Objective   Physical Exam   Constitutional: She is oriented to person, place, and time. No distress.   Bright and in fair spirits. No apparent distress. No pallor, jaundice, diaphoresis, or cyanosis.     HENT:   Head: Atraumatic.   Right Ear: Tympanic membrane, external ear and ear canal normal.   Left Ear: Tympanic membrane, external ear and ear canal normal.   Nose: Nose normal.   Mouth/Throat: Oropharynx is clear and moist. Mucous membranes are not pale and not cyanotic.   Eyes: EOM are normal. Pupils are equal, round, and reactive to light. No scleral icterus.   Neck: No JVD present. Carotid bruit is not present. No tracheal deviation present. No thyromegaly present.   Cardiovascular: Normal rate, regular rhythm, S1 normal, S2 normal and intact distal pulses.  Exam reveals no gallop, no S3 and no S4.    No murmur heard.  Pulmonary/Chest: Breath sounds normal. No stridor. No respiratory distress.   Abdominal: Soft. Normal aorta and bowel sounds are normal. She exhibits no distension, no abdominal bruit and no mass. There is no hepatosplenomegaly. There is no tenderness. No hernia.   Musculoskeletal: She exhibits no tenderness or deformity.       Vascular Status -  Her exam exhibits right foot vasculature normal. Her exam exhibits no right foot edema. Her exam exhibits left foot vasculature normal. Her exam exhibits no left foot edema.  Lymphadenopathy:        Head (right  side): No submandibular adenopathy present.        Head (left side): No submandibular adenopathy present.     She has no cervical adenopathy.   Neurological: She is alert and oriented to person, place, and time. She has normal reflexes. She displays normal reflexes. No cranial nerve deficit. She exhibits normal muscle tone. Coordination normal.   Skin: Skin is warm and dry. No rash noted. She is not diaphoretic. No cyanosis. No pallor. Nails show no clubbing.   Psychiatric: She has a normal mood and affect.     Assessment/Plan   Problems Addressed this Visit        Cardiovascular and Mediastinum    Mixed hyperlipidemia  Encouraged to continue to work on her diet and exercise plan.    Essential hypertension  As above.   Continue current medication.    Migraine without aura and without status migrainosus, not intractable  Continue current medication    Relevant Medications    traMADol (ULTRAM) 50 MG tablet    acetaminophen-codeine (TYLENOL #3) 300-30 MG per tablet       Respiratory    Extrinsic asthma without complication  Mild persistent asthma. The patient is not currently having an exacerbation. In general, the patient's disease is well controlled.  Discussed monitoring symptoms and use of quick-relief medications and contacting us early in the course of exacerbations.    Relevant Medications    Fluticasone Furoate-Vilanterol (BREO ELLIPTA) 100-25 MCG/INH aerosol powder     Chronic seasonal allergic rhinitis       Digestive    Gastroesophageal reflux disease without esophagitis  Symptoms are currently stable.  Reminded regarding lifestyle modification.  Encouraged again to take her esomeprazole in the evening     Vitamin D deficiency  Continue supplementation with monitoring.    Relevant Medications    vitamin D (ERGOCALCIFEROL) 34851 units capsule capsule       Nervous and Auditory    Chest pain  Patient uninterested in pursuing this further but will report immediately if any worse, any change in the quality, or  if any new associated symptoms        Musculoskeletal and Integument    Generalized osteoarthritis  Reminded regarding symptomatic treatment.   Continue current medication    Relevant Medications    traMADol (ULTRAM) 50 MG tablet    acetaminophen-codeine (TYLENOL #3) 300-30 MG per tablet    Postmenopausal osteoporosisIn the evening        Other    Recurrent major depressive disorder, in partial remission    Healthcare maintenance  Reviewed the potential benefits of shingrix. Prescription written.  We'll arrange an updated mammogram   Patient remains uninterested in a screening colonoscopy   We'll discuss an updated pneumovax at her return     Relevant Medications    Zoster Vac Recomb Adjuvanted (SHINGRIX) 50 MCG reconstituted suspension    Other Relevant Orders    Mammo Screening Digital Tomosynthesis Bilateral With CAD    Encounter for screening mammogram for breast cancer    Relevant Orders    Mammo Screening Digital Tomosynthesis Bilateral With CAD

## 2018-03-07 ENCOUNTER — APPOINTMENT (OUTPATIENT)
Dept: MAMMOGRAPHY | Facility: HOSPITAL | Age: 73
End: 2018-03-07

## 2018-05-01 ENCOUNTER — OFFICE VISIT (OUTPATIENT)
Dept: FAMILY MEDICINE CLINIC | Facility: CLINIC | Age: 73
End: 2018-05-01

## 2018-05-01 DIAGNOSIS — J30.2 CHRONIC SEASONAL ALLERGIC RHINITIS, UNSPECIFIED TRIGGER: ICD-10-CM

## 2018-05-01 DIAGNOSIS — R07.89 OTHER CHEST PAIN: ICD-10-CM

## 2018-05-01 DIAGNOSIS — F33.41 RECURRENT MAJOR DEPRESSIVE DISORDER, IN PARTIAL REMISSION (HCC): ICD-10-CM

## 2018-05-01 DIAGNOSIS — Z23 ENCOUNTER FOR IMMUNIZATION: ICD-10-CM

## 2018-05-01 DIAGNOSIS — J45.40 MODERATE PERSISTENT EXTRINSIC ASTHMA WITHOUT COMPLICATION: ICD-10-CM

## 2018-05-01 DIAGNOSIS — K21.9 GASTROESOPHAGEAL REFLUX DISEASE WITHOUT ESOPHAGITIS: ICD-10-CM

## 2018-05-01 DIAGNOSIS — Z00.00 HEALTHCARE MAINTENANCE: ICD-10-CM

## 2018-05-01 DIAGNOSIS — I10 ESSENTIAL HYPERTENSION: Primary | ICD-10-CM

## 2018-05-01 DIAGNOSIS — M81.0 POSTMENOPAUSAL OSTEOPOROSIS: ICD-10-CM

## 2018-05-01 DIAGNOSIS — M15.9 GENERALIZED OSTEOARTHRITIS: ICD-10-CM

## 2018-05-01 DIAGNOSIS — E78.2 MIXED HYPERLIPIDEMIA: ICD-10-CM

## 2018-05-01 DIAGNOSIS — E55.9 VITAMIN D DEFICIENCY: ICD-10-CM

## 2018-05-01 DIAGNOSIS — G43.009 MIGRAINE WITHOUT AURA AND WITHOUT STATUS MIGRAINOSUS, NOT INTRACTABLE: ICD-10-CM

## 2018-05-01 DIAGNOSIS — J45.30 MILD PERSISTENT EXTRINSIC ASTHMA WITHOUT COMPLICATION: ICD-10-CM

## 2018-05-01 PROCEDURE — 99214 OFFICE O/P EST MOD 30 MIN: CPT | Performed by: GENERAL PRACTICE

## 2018-05-01 PROCEDURE — 90732 PPSV23 VACC 2 YRS+ SUBQ/IM: CPT | Performed by: GENERAL PRACTICE

## 2018-05-01 PROCEDURE — G0009 ADMIN PNEUMOCOCCAL VACCINE: HCPCS | Performed by: GENERAL PRACTICE

## 2018-05-01 RX ORDER — ACETAMINOPHEN AND CODEINE PHOSPHATE 300; 30 MG/1; MG/1
1 TABLET ORAL 2 TIMES DAILY PRN
Qty: 60 TABLET | Refills: 2 | Status: SHIPPED | OUTPATIENT
Start: 2018-05-01 | End: 2018-08-03 | Stop reason: SDUPTHER

## 2018-05-01 RX ORDER — TRAMADOL HYDROCHLORIDE 50 MG/1
50 TABLET ORAL 4 TIMES DAILY
Qty: 120 TABLET | Refills: 2 | Status: SHIPPED | OUTPATIENT
Start: 2018-05-01 | End: 2018-08-03 | Stop reason: SDUPTHER

## 2018-05-01 NOTE — PROGRESS NOTES
Subjective   Farzana Hawk is a 72 y.o. female.     History of Present Illness     Chest Pain  She continues to have intermittent chest pain. Episodes have consistently occurred at night while at rest in bed and have generally lasted about 3-5 minutes. She describes the pain as an anterior pressure. This does not radiate elsewhere and as of late has been unassociated with any other symptoms.. She has been unable to identify and precipitating, aggravating, or relieving factors. She has remained active over this period and denies any such symptoms with exertion. She denies any palpitations, change in her shortness of breath with activity, orthopnea, PND, lightheadedness, diaphoresis, or swelling of the ankles. Stress testing perfromed on 8/22/17 revealed no evidence of inducible ischemia/perfusion defect. She continues to have occasional heartburn described as a burning epigastric and retrosternal burning most frequently following meals and when she lies down at night. She denies any difficulty swallowing and has had no vomiting. She is taking her esomeprazole in the late afternoon/early evening    Asthma  Patient's symptoms include dyspnea, non-productive cough and wheezing. She denies any hemoptysis. The patient has been suffering from these symptoms for a number of years. Symptoms have been unchanged since last here. Medications used in the past to treat these symptoms include beta agonist inhalers, combination beta agonists/steroid inhalers and leukotriene antagonists. Suspected precipitants include upper respiratory infection and allergens.     Headache  Symptoms began many years ago. Generally, the headaches last several hours and occur once per week on average. The headaches do not seem to be related to any time of the day. The headaches are usually pounding and are located in the forehead bilaterally.  The patient rates her most severe headaches a 10 on a scale from 1 to 10. Recently, the headaches have  been stable. Work attendance or other daily activities are affected by the headaches. Precipitating factors include: stress. The headaches are usually not preceded by an aura. The patient denies dizziness, loss of balance, muscle weakness, numbness of extremities, speech difficulties and vision problems. Home treatment has included acetaminophen and codeine with some improvement. Other history includes: migraine headaches diagnosed in the past. Family history includes migraine.    Hypertension  Home blood pressure readings: are consistently at goal. Associated signs and symptoms: chest pain and dyspnea. Patient denies: palpitations, orthopnea, paroxysmal nocturnal dyspnea and peripheral edema. Current antihypertensive medications includes valsartan and hydrochlorothiazide. Medication compliance: taking as prescribed.   Lab Results   Component Value Date    CREATININE 1.06 02/07/2017     Lab Results   Component Value Date    CHOL 208 (H) 02/07/2017    TRIG 84 02/07/2017    HDL 68 02/07/2017     (H) 02/07/2017     Labs  Most recent vitamin D 22.  She is on high-dose supplementation    The following portions of the patient's history were reviewed and updated as appropriate: allergies, current medications, past medical history, past social history and problem list.    Review of Systems   Constitutional: Positive for fatigue. Negative for appetite change, chills, fever and unexpected weight change.   HENT: Positive for rhinorrhea. Negative for congestion, ear pain, sneezing, sore throat and voice change.    Eyes: Negative for visual disturbance.   Respiratory: Positive for cough, shortness of breath and wheezing.    Cardiovascular: Positive for chest pain. Negative for palpitations and leg swelling.   Gastrointestinal: Negative for abdominal pain, blood in stool, constipation, diarrhea, nausea and vomiting.        Occasional heartburn   Endocrine: Negative for polydipsia.   Genitourinary: Negative for difficulty  urinating, dysuria, frequency, hematuria, menstrual problem, pelvic pain, urgency, vaginal bleeding and vaginal discharge.   Musculoskeletal: Positive for arthralgias and back pain. Negative for joint swelling, myalgias and neck pain.   Skin: Negative for color change.   Neurological: Positive for headaches. Negative for tremors, weakness and numbness.   Psychiatric/Behavioral: Positive for dysphoric mood and sleep disturbance. Negative for suicidal ideas. The patient is nervous/anxious.      Objective   Physical Exam   Constitutional: She is oriented to person, place, and time. No distress.   Bright and in fair spirits. No apparent distress. No pallor, jaundice, diaphoresis, or cyanosis.     HENT:   Head: Atraumatic.   Right Ear: Tympanic membrane, external ear and ear canal normal.   Left Ear: Tympanic membrane, external ear and ear canal normal.   Nose: Nose normal.   Mouth/Throat: Oropharynx is clear and moist. Mucous membranes are not pale and not cyanotic.   Eyes: EOM are normal. Pupils are equal, round, and reactive to light. No scleral icterus.   Neck: No JVD present. Carotid bruit is not present. No tracheal deviation present. No thyromegaly present.   Cardiovascular: Normal rate, regular rhythm, S1 normal, S2 normal and intact distal pulses.  Exam reveals no gallop, no S3 and no S4.    No murmur heard.  Pulmonary/Chest: Breath sounds normal. No stridor. No respiratory distress.   Abdominal: Soft. Normal aorta and bowel sounds are normal. She exhibits no distension, no abdominal bruit and no mass. There is no hepatosplenomegaly. There is no tenderness. No hernia.   Musculoskeletal: She exhibits no tenderness or deformity.     Vascular Status -  Her right foot exhibits abnormal foot vasculature . Her right foot exhibits no edema. Her left foot exhibits abnormal foot vasculature . Her left foot exhibits no edema.  Lymphadenopathy:        Head (right side): No submandibular adenopathy present.        Head  (left side): No submandibular adenopathy present.     She has no cervical adenopathy.   Neurological: She is alert and oriented to person, place, and time. She has normal reflexes. She displays normal reflexes. No cranial nerve deficit. She exhibits normal muscle tone. Coordination normal.   Skin: Skin is warm and dry. No rash noted. She is not diaphoretic. No cyanosis. No pallor. Nails show no clubbing.   Psychiatric: She has a normal mood and affect.     Assessment/Plan   Problems Addressed this Visit        Cardiovascular and Mediastinum    Mixed hyperlipidemia  Encouraged to continue to work on her diet and exercise plan.    Essential hypertension   As above.   Continue current medication.    Migraine without aura and without status migrainosus, not intractable  Migraine without aura.  Additional workup: None.  Headache diary: No  Lifestyle changes: Stress reduction, sleep hygiene   Abortive medications to use in the future: Codeine/APAP  Prophylactic medications: Topiramate  Encouraged to report if symptoms worsen     Relevant Medications    traMADol (ULTRAM) 50 MG tablet    acetaminophen-codeine (TYLENOL #3) 300-30 MG per tablet       Respiratory    Extrinsic asthma without complication  Moderate persistent asthma. The patient is not currently having an exacerbation. In general, the patient's disease is well controlled.  Discussed monitoring symptoms and use of quick-relief medications and contacting us early in the course of exacerbations.    Relevant Medications    Fluticasone Furoate-Vilanterol (BREO ELLIPTA) 100-25 MCG/INH aerosol powder     Chronic seasonal allergic rhinitis       Digestive    Gastroesophageal reflux disease without esophagitis    Vitamin D deficiency       Nervous and Auditory    Chest pain  Atypical  Patient remains uninterested in a cardiology opinion but will let us know if she should change her mind       Musculoskeletal and Integument    Generalized osteoarthritis  Reminded  regarding symptomatic treatment.   Continue current medication    Relevant Medications    traMADol (ULTRAM) 50 MG tablet    acetaminophen-codeine (TYLENOL #3) 300-30 MG per tablet    Postmenopausal osteoporosis       Other    Recurrent major depressive disorder, in partial remission    Healthcare maintenance  Recommended an updated pneumovax.  Patient remains uninterested in breast or colon cancer screening    Relevant Orders    Pneumococcal Polysaccharide Vaccine 23-Valent Greater Than or Equal To 1yo Subcutaneous / IM (Completed)    Encounter for immunization    Relevant Orders    Pneumococcal Polysaccharide Vaccine 23-Valent Greater Than or Equal To 1yo Subcutaneous / IM (Completed)

## 2018-05-02 VITALS
SYSTOLIC BLOOD PRESSURE: 125 MMHG | HEART RATE: 68 BPM | WEIGHT: 174 LBS | HEIGHT: 62 IN | RESPIRATION RATE: 12 BRPM | OXYGEN SATURATION: 97 % | DIASTOLIC BLOOD PRESSURE: 80 MMHG | BODY MASS INDEX: 32.02 KG/M2 | TEMPERATURE: 98.2 F

## 2018-05-02 PROBLEM — Z12.31 ENCOUNTER FOR SCREENING MAMMOGRAM FOR BREAST CANCER: Status: RESOLVED | Noted: 2017-02-07 | Resolved: 2018-05-02

## 2018-06-27 RX ORDER — MONTELUKAST SODIUM 10 MG/1
TABLET ORAL
Qty: 90 TABLET | Refills: 2 | Status: SHIPPED | OUTPATIENT
Start: 2018-06-27 | End: 2019-03-25 | Stop reason: SDUPTHER

## 2018-06-27 RX ORDER — VALSARTAN AND HYDROCHLOROTHIAZIDE 160; 25 MG/1; MG/1
TABLET ORAL
Qty: 90 TABLET | Refills: 2 | Status: SHIPPED | OUTPATIENT
Start: 2018-06-27 | End: 2019-03-25 | Stop reason: SDUPTHER

## 2018-07-12 RX ORDER — LEVOCETIRIZINE DIHYDROCHLORIDE 5 MG/1
5 TABLET, FILM COATED ORAL DAILY
Qty: 90 TABLET | Refills: 2 | Status: SHIPPED | OUTPATIENT
Start: 2018-07-12 | End: 2019-04-08 | Stop reason: SDUPTHER

## 2018-07-16 DIAGNOSIS — E55.9 VITAMIN D DEFICIENCY: ICD-10-CM

## 2018-07-17 RX ORDER — ERGOCALCIFEROL 1.25 MG/1
CAPSULE ORAL
Qty: 4 CAPSULE | Refills: 5 | Status: SHIPPED | OUTPATIENT
Start: 2018-07-17 | End: 2019-07-24 | Stop reason: SDUPTHER

## 2018-07-31 RX ORDER — ESOMEPRAZOLE MAGNESIUM 40 MG/1
CAPSULE, DELAYED RELEASE ORAL
Qty: 90 CAPSULE | Refills: 2 | Status: SHIPPED | OUTPATIENT
Start: 2018-07-31 | End: 2019-04-29 | Stop reason: SDUPTHER

## 2018-08-03 ENCOUNTER — OFFICE VISIT (OUTPATIENT)
Dept: FAMILY MEDICINE CLINIC | Facility: CLINIC | Age: 73
End: 2018-08-03

## 2018-08-03 DIAGNOSIS — E55.9 VITAMIN D DEFICIENCY: ICD-10-CM

## 2018-08-03 DIAGNOSIS — G43.009 MIGRAINE WITHOUT AURA AND WITHOUT STATUS MIGRAINOSUS, NOT INTRACTABLE: ICD-10-CM

## 2018-08-03 DIAGNOSIS — F33.41 RECURRENT MAJOR DEPRESSIVE DISORDER, IN PARTIAL REMISSION (HCC): ICD-10-CM

## 2018-08-03 DIAGNOSIS — M15.9 GENERALIZED OSTEOARTHRITIS: ICD-10-CM

## 2018-08-03 DIAGNOSIS — M81.0 POSTMENOPAUSAL OSTEOPOROSIS: ICD-10-CM

## 2018-08-03 DIAGNOSIS — K21.9 GASTROESOPHAGEAL REFLUX DISEASE WITHOUT ESOPHAGITIS: ICD-10-CM

## 2018-08-03 DIAGNOSIS — J45.40 MODERATE PERSISTENT EXTRINSIC ASTHMA WITHOUT COMPLICATION: ICD-10-CM

## 2018-08-03 DIAGNOSIS — E78.2 MIXED HYPERLIPIDEMIA: ICD-10-CM

## 2018-08-03 DIAGNOSIS — J30.2 CHRONIC SEASONAL ALLERGIC RHINITIS, UNSPECIFIED TRIGGER: ICD-10-CM

## 2018-08-03 DIAGNOSIS — R07.89 ATYPICAL CHEST PAIN: ICD-10-CM

## 2018-08-03 DIAGNOSIS — Z00.00 HEALTHCARE MAINTENANCE: ICD-10-CM

## 2018-08-03 DIAGNOSIS — I10 ESSENTIAL HYPERTENSION: Primary | ICD-10-CM

## 2018-08-03 PROCEDURE — 99214 OFFICE O/P EST MOD 30 MIN: CPT | Performed by: GENERAL PRACTICE

## 2018-08-03 RX ORDER — ACETAMINOPHEN AND CODEINE PHOSPHATE 300; 30 MG/1; MG/1
1 TABLET ORAL 2 TIMES DAILY PRN
Qty: 60 TABLET | Refills: 2 | Status: SHIPPED | OUTPATIENT
Start: 2018-08-03 | End: 2019-01-28 | Stop reason: SDUPTHER

## 2018-08-03 RX ORDER — TRAMADOL HYDROCHLORIDE 50 MG/1
50 TABLET ORAL 4 TIMES DAILY
Qty: 120 TABLET | Refills: 2 | Status: SHIPPED | OUTPATIENT
Start: 2018-08-03 | End: 2018-11-11 | Stop reason: SDUPTHER

## 2018-08-03 NOTE — PROGRESS NOTES
Subjective   Farzana Hawk is a 73 y.o. female.     History of Present Illness     Chest Pain  She continues to have intermittent chest pain. Episodes continue to occur at night while at rest in bed and generally last about 3-5 minutes. She describes the pain as an anterior pressure. This does not radiate elsewhere and as of late has been unassociated with any other symptoms.. She has been unable to identify and precipitating, aggravating, or relieving factors. She remains active and denies any such symptoms with exertion. She denies any palpitations, change in her shortness of breath with activity, orthopnea, PND, lightheadedness, diaphoresis, or swelling of the ankles. Stress testing performed on 8/22/17 revealed no evidence of inducible ischemia/perfusion defect with an eEF of 70%. She continues to have occasional heartburn described as a burning epigastric and retrosternal burning most frequently following meals and when she lies down at night. She denies any difficulty swallowing and has had no vomiting. She is taking her esomeprazole in the late afternoon/early evening    Asthma  Patient's symptoms include dyspnea, non-productive cough and wheezing. She denies any hemoptysis. The patient has been suffering from these symptoms for a number of years. Symptoms have been unchanged since last here. Medications used in the past to treat these symptoms include beta agonist inhalers, combination beta agonists/steroid inhalers and leukotriene antagonists. Suspected precipitants include upper respiratory infection and allergens. She has taken her husbands furosemide 40 mg tabs once or twice since last here and on each occasion felt that it helped with these symptoms.    Headache  Symptoms began many years ago. Generally, the headaches last several hours and occur once per week on average. The headaches do not seem to be related to any time of the day. The headaches are usually pounding and are located in the forehead  bilaterally.  The patient rates her most severe headaches a 10 on a scale from 1 to 10. Recently, the headaches have been stable. Work attendance or other daily activities are affected by the headaches. Precipitating factors include: stress. The headaches are usually not preceded by an aura. The patient denies dizziness, loss of balance, muscle weakness, numbness of extremities, speech difficulties and vision problems. Home treatment has included acetaminophen and codeine with some improvement. Other history includes: migraine headaches diagnosed in the past. Family history includes migraine.    Hypertension  Home blood pressure readings: are consistently at goal. Associated signs and symptoms: chest pain and dyspnea. Patient denies: palpitations, orthopnea, paroxysmal nocturnal dyspnea and peripheral edema. Current antihypertensive medications includes valsartan and hydrochlorothiazide. Medication compliance: taking as prescribed.     The following portions of the patient's history were reviewed and updated as appropriate: allergies, current medications, past medical history, past social history and problem list.    Review of Systems   Constitutional: Positive for fatigue. Negative for appetite change, chills, fever and unexpected weight change.   HENT: Positive for rhinorrhea. Negative for congestion, ear pain, sneezing, sore throat and voice change.    Eyes: Negative for visual disturbance.   Respiratory: Positive for cough, shortness of breath and wheezing.    Cardiovascular: Positive for chest pain. Negative for palpitations and leg swelling.   Gastrointestinal: Negative for abdominal pain, blood in stool, constipation, diarrhea, nausea and vomiting.        Occasional heartburn   Endocrine: Negative for polydipsia.   Genitourinary: Negative for difficulty urinating, dysuria, frequency, hematuria, menstrual problem, pelvic pain, urgency, vaginal bleeding and vaginal discharge.   Musculoskeletal: Positive for  arthralgias and back pain. Negative for joint swelling, myalgias and neck pain.   Skin: Negative for color change.   Neurological: Positive for headaches. Negative for tremors, weakness and numbness.   Psychiatric/Behavioral: Positive for dysphoric mood and sleep disturbance. Negative for suicidal ideas. The patient is nervous/anxious.      Objective   Physical Exam   Constitutional: She is oriented to person, place, and time. No distress.   Bright and in fair spirits. No apparent distress. No pallor, jaundice, diaphoresis, or cyanosis.     HENT:   Head: Atraumatic.   Right Ear: Tympanic membrane, external ear and ear canal normal.   Left Ear: Tympanic membrane, external ear and ear canal normal.   Nose: Nose normal.   Mouth/Throat: Oropharynx is clear and moist. Mucous membranes are not pale and not cyanotic.   Eyes: Pupils are equal, round, and reactive to light. EOM are normal. No scleral icterus.   Neck: No JVD present. Carotid bruit is not present. No tracheal deviation present. No thyromegaly present.   Cardiovascular: Normal rate, regular rhythm, S1 normal, S2 normal and intact distal pulses.  Exam reveals no gallop, no S3 and no S4.    No murmur heard.  Pulmonary/Chest: Breath sounds normal. No stridor. No respiratory distress.   Abdominal: Soft. Normal aorta and bowel sounds are normal. She exhibits no distension, no abdominal bruit and no mass. There is no hepatosplenomegaly. There is no tenderness. No hernia.   Musculoskeletal: She exhibits no tenderness or deformity.     Vascular Status -  Her right foot exhibits no edema. Her left foot exhibits no edema.  Lymphadenopathy:        Head (right side): No submandibular adenopathy present.        Head (left side): No submandibular adenopathy present.     She has no cervical adenopathy.   Neurological: She is alert and oriented to person, place, and time. She has normal reflexes. She displays normal reflexes. No cranial nerve deficit. She exhibits normal  muscle tone. Coordination normal.   Skin: Skin is warm and dry. No rash noted. She is not diaphoretic. No cyanosis. No pallor. Nails show no clubbing.   Psychiatric: She has a normal mood and affect.     Assessment/Plan   Problems Addressed this Visit        Cardiovascular and Mediastinum    Mixed hyperlipidemia  Encouraged to continue to work on her diet and exercise plan.  Continue current medication  Fasting labs scheduled once more    Relevant Orders    Lipid Panel    TSH    Essential hypertension   As above.   Discussed replacing valsartan HCT with valsartan or another ARB along with furosemide in place of HCTZ. Patient will consider    Relevant Orders    CBC & Differential    Comprehensive Metabolic Panel    Migraine without aura and without status migrainosus, not intractable   Migraine without aura.   Additional workup: None.   Headache diary: No   Lifestyle changes: Stress reduction, sleep hygiene    Abortive medications to use in the future: Codeine/APAP   Prophylactic medications: Topiramate  Encouraged to report if symptoms worsen    Relevant Medications    acetaminophen-codeine (TYLENOL #3) 300-30 MG per tablet    traMADol (ULTRAM) 50 MG tablet       Respiratory    Extrinsic asthma without complication  As above.   Continue current medication.    Chronic seasonal allergic rhinitis       Digestive    Gastroesophageal reflux disease without esophagitis    Vitamin D deficiency  Continue maintenance supplementation with monitoring.    Relevant Orders    Vitamin D 25 Hydroxy       Nervous and Auditory    Atypical chest pain  Patient remains uninterested in a cardiology opinion but will report if any changes or concerns       Musculoskeletal and Integument    Generalized osteoarthritis  Reminded regarding symptomatic treatment.   Continue current medication    Relevant Medications    acetaminophen-codeine (TYLENOL #3) 300-30 MG per tablet    traMADol (ULTRAM) 50 MG tablet    Postmenopausal osteoporosis        Other    Recurrent major depressive disorder, in partial remission (CMS/HCC)  Stable.  Supportive therapy.   Continue current medication.    Healthcare maintenance  Patient has received both doses of shingrix.  Reminded to get a flu shot when available.  Patient remains uninterested in breast or colon cancer screening    Relevant Orders    Hepatitis C Antibody

## 2018-08-04 VITALS
DIASTOLIC BLOOD PRESSURE: 65 MMHG | BODY MASS INDEX: 32.2 KG/M2 | WEIGHT: 175 LBS | SYSTOLIC BLOOD PRESSURE: 120 MMHG | RESPIRATION RATE: 12 BRPM | OXYGEN SATURATION: 98 % | HEART RATE: 62 BPM | TEMPERATURE: 97.7 F | HEIGHT: 62 IN

## 2018-08-04 PROBLEM — R07.89 ATYPICAL CHEST PAIN: Status: ACTIVE | Noted: 2017-08-08

## 2018-08-27 RX ORDER — DULOXETIN HYDROCHLORIDE 30 MG/1
30 CAPSULE, DELAYED RELEASE ORAL DAILY
Qty: 30 CAPSULE | Refills: 5 | Status: SHIPPED | OUTPATIENT
Start: 2018-08-27 | End: 2020-03-19

## 2018-11-11 DIAGNOSIS — M15.9 GENERALIZED OSTEOARTHRITIS: ICD-10-CM

## 2018-11-12 RX ORDER — TRAMADOL HYDROCHLORIDE 50 MG/1
TABLET ORAL
Qty: 120 TABLET | Refills: 2 | Status: SHIPPED | OUTPATIENT
Start: 2018-11-12 | End: 2019-01-28 | Stop reason: SDUPTHER

## 2019-01-28 ENCOUNTER — TELEPHONE (OUTPATIENT)
Dept: FAMILY MEDICINE CLINIC | Facility: CLINIC | Age: 74
End: 2019-01-28

## 2019-01-28 ENCOUNTER — RESULTS ENCOUNTER (OUTPATIENT)
Dept: FAMILY MEDICINE CLINIC | Facility: CLINIC | Age: 74
End: 2019-01-28

## 2019-01-28 ENCOUNTER — OFFICE VISIT (OUTPATIENT)
Dept: FAMILY MEDICINE CLINIC | Facility: CLINIC | Age: 74
End: 2019-01-28

## 2019-01-28 VITALS
HEART RATE: 81 BPM | OXYGEN SATURATION: 97 % | WEIGHT: 175 LBS | TEMPERATURE: 98.6 F | SYSTOLIC BLOOD PRESSURE: 125 MMHG | DIASTOLIC BLOOD PRESSURE: 80 MMHG | BODY MASS INDEX: 32.2 KG/M2 | HEIGHT: 62 IN | RESPIRATION RATE: 12 BRPM

## 2019-01-28 DIAGNOSIS — E55.9 VITAMIN D DEFICIENCY: ICD-10-CM

## 2019-01-28 DIAGNOSIS — M85.89 OSTEOPENIA OF MULTIPLE SITES: ICD-10-CM

## 2019-01-28 DIAGNOSIS — Z12.11 SCREENING FOR COLORECTAL CANCER: Primary | ICD-10-CM

## 2019-01-28 DIAGNOSIS — I10 ESSENTIAL HYPERTENSION: ICD-10-CM

## 2019-01-28 DIAGNOSIS — Z23 ENCOUNTER FOR IMMUNIZATION: ICD-10-CM

## 2019-01-28 DIAGNOSIS — F33.41 RECURRENT MAJOR DEPRESSIVE DISORDER, IN PARTIAL REMISSION (HCC): ICD-10-CM

## 2019-01-28 DIAGNOSIS — Z12.12 SCREENING FOR COLORECTAL CANCER: ICD-10-CM

## 2019-01-28 DIAGNOSIS — G43.009 MIGRAINE WITHOUT AURA AND WITHOUT STATUS MIGRAINOSUS, NOT INTRACTABLE: ICD-10-CM

## 2019-01-28 DIAGNOSIS — K21.9 GASTROESOPHAGEAL REFLUX DISEASE WITHOUT ESOPHAGITIS: ICD-10-CM

## 2019-01-28 DIAGNOSIS — Z00.00 HEALTHCARE MAINTENANCE: ICD-10-CM

## 2019-01-28 DIAGNOSIS — J45.40 MODERATE PERSISTENT EXTRINSIC ASTHMA WITHOUT COMPLICATION: ICD-10-CM

## 2019-01-28 DIAGNOSIS — E78.2 MIXED HYPERLIPIDEMIA: Primary | ICD-10-CM

## 2019-01-28 DIAGNOSIS — J30.2 CHRONIC SEASONAL ALLERGIC RHINITIS: ICD-10-CM

## 2019-01-28 DIAGNOSIS — Z12.12 SCREENING FOR COLORECTAL CANCER: Primary | ICD-10-CM

## 2019-01-28 DIAGNOSIS — Z12.11 SCREENING FOR COLORECTAL CANCER: ICD-10-CM

## 2019-01-28 DIAGNOSIS — M15.9 GENERALIZED OSTEOARTHRITIS: ICD-10-CM

## 2019-01-28 PROBLEM — R07.89 ATYPICAL CHEST PAIN: Status: RESOLVED | Noted: 2017-08-08 | Resolved: 2019-01-28

## 2019-01-28 LAB
25(OH)D3 SERPL-MCNC: 38 NG/ML
ALBUMIN SERPL-MCNC: 4.2 G/DL (ref 3.4–4.8)
ALBUMIN/GLOB SERPL: 1.6 G/DL (ref 1.5–2.5)
ALP SERPL-CCNC: 82 U/L (ref 35–104)
ALT SERPL W P-5'-P-CCNC: 10 U/L (ref 10–36)
ANION GAP SERPL CALCULATED.3IONS-SCNC: 6.6 MMOL/L (ref 3.6–11.2)
AST SERPL-CCNC: 15 U/L (ref 10–30)
BASOPHILS # BLD AUTO: 0.05 10*3/MM3 (ref 0–0.3)
BASOPHILS NFR BLD AUTO: 0.7 % (ref 0–2)
BILIRUB SERPL-MCNC: 0.7 MG/DL (ref 0.2–1.8)
BUN BLD-MCNC: 12 MG/DL (ref 7–21)
BUN/CREAT SERPL: 12.8 (ref 7–25)
CALCIUM SPEC-SCNC: 9.5 MG/DL (ref 7.7–10)
CHLORIDE SERPL-SCNC: 111 MMOL/L (ref 99–112)
CHOLEST SERPL-MCNC: 194 MG/DL (ref 0–200)
CO2 SERPL-SCNC: 24.4 MMOL/L (ref 24.3–31.9)
CREAT BLD-MCNC: 0.94 MG/DL (ref 0.43–1.29)
DEPRECATED RDW RBC AUTO: 42 FL (ref 37–54)
EOSINOPHIL # BLD AUTO: 0.29 10*3/MM3 (ref 0–0.7)
EOSINOPHIL NFR BLD AUTO: 4.3 % (ref 0–7)
ERYTHROCYTE [DISTWIDTH] IN BLOOD BY AUTOMATED COUNT: 12.9 % (ref 11.5–14.5)
GFR SERPL CREATININE-BSD FRML MDRD: 58 ML/MIN/1.73
GLOBULIN UR ELPH-MCNC: 2.6 GM/DL
GLUCOSE BLD-MCNC: 83 MG/DL (ref 70–110)
HCT VFR BLD AUTO: 38.6 % (ref 37–47)
HCV AB SER DONR QL: NORMAL
HDLC SERPL-MCNC: 62 MG/DL (ref 60–100)
HGB BLD-MCNC: 12.5 G/DL (ref 12–16)
IMM GRANULOCYTES # BLD AUTO: 0.01 10*3/MM3 (ref 0–0.03)
IMM GRANULOCYTES NFR BLD AUTO: 0.1 % (ref 0–0.5)
LDLC SERPL CALC-MCNC: 118 MG/DL (ref 0–100)
LDLC/HDLC SERPL: 1.9 {RATIO}
LYMPHOCYTES # BLD AUTO: 2.05 10*3/MM3 (ref 1–3)
LYMPHOCYTES NFR BLD AUTO: 30.1 % (ref 16–46)
MCH RBC QN AUTO: 29.3 PG (ref 27–33)
MCHC RBC AUTO-ENTMCNC: 32.4 G/DL (ref 33–37)
MCV RBC AUTO: 90.4 FL (ref 80–94)
MONOCYTES # BLD AUTO: 0.42 10*3/MM3 (ref 0.1–0.9)
MONOCYTES NFR BLD AUTO: 6.2 % (ref 0–12)
NEUTROPHILS # BLD AUTO: 3.98 10*3/MM3 (ref 1.4–6.5)
NEUTROPHILS NFR BLD AUTO: 58.6 % (ref 40–75)
OSMOLALITY SERPL CALC.SUM OF ELEC: 282 MOSM/KG (ref 273–305)
PLATELET # BLD AUTO: 227 10*3/MM3 (ref 130–400)
PMV BLD AUTO: 10.9 FL (ref 6–10)
POTASSIUM BLD-SCNC: 4.5 MMOL/L (ref 3.5–5.3)
PROT SERPL-MCNC: 6.8 G/DL (ref 6–8)
RBC # BLD AUTO: 4.27 10*6/MM3 (ref 4.2–5.4)
SODIUM BLD-SCNC: 142 MMOL/L (ref 135–153)
TRIGL SERPL-MCNC: 72 MG/DL (ref 0–150)
TSH SERPL DL<=0.05 MIU/L-ACNC: 2.1 MIU/ML (ref 0.55–4.78)
VLDLC SERPL-MCNC: 14.4 MG/DL
WBC NRBC COR # BLD: 6.8 10*3/MM3 (ref 4.5–12.5)

## 2019-01-28 PROCEDURE — 90632 HEPA VACCINE ADULT IM: CPT | Performed by: GENERAL PRACTICE

## 2019-01-28 PROCEDURE — 80061 LIPID PANEL: CPT | Performed by: GENERAL PRACTICE

## 2019-01-28 PROCEDURE — 99214 OFFICE O/P EST MOD 30 MIN: CPT | Performed by: GENERAL PRACTICE

## 2019-01-28 PROCEDURE — 36415 COLL VENOUS BLD VENIPUNCTURE: CPT | Performed by: GENERAL PRACTICE

## 2019-01-28 PROCEDURE — 86803 HEPATITIS C AB TEST: CPT | Performed by: GENERAL PRACTICE

## 2019-01-28 PROCEDURE — 90471 IMMUNIZATION ADMIN: CPT | Performed by: GENERAL PRACTICE

## 2019-01-28 PROCEDURE — 85025 COMPLETE CBC W/AUTO DIFF WBC: CPT | Performed by: GENERAL PRACTICE

## 2019-01-28 PROCEDURE — 80053 COMPREHEN METABOLIC PANEL: CPT | Performed by: GENERAL PRACTICE

## 2019-01-28 PROCEDURE — 82306 VITAMIN D 25 HYDROXY: CPT | Performed by: GENERAL PRACTICE

## 2019-01-28 PROCEDURE — 84443 ASSAY THYROID STIM HORMONE: CPT | Performed by: GENERAL PRACTICE

## 2019-01-28 RX ORDER — ACETAMINOPHEN AND CODEINE PHOSPHATE 300; 30 MG/1; MG/1
1 TABLET ORAL 2 TIMES DAILY PRN
Qty: 60 TABLET | Refills: 2 | Status: SHIPPED | OUTPATIENT
Start: 2019-01-28 | End: 2019-05-28 | Stop reason: SDUPTHER

## 2019-01-28 RX ORDER — TRAMADOL HYDROCHLORIDE 50 MG/1
50 TABLET ORAL 4 TIMES DAILY
Qty: 120 TABLET | Refills: 2 | Status: SHIPPED | OUTPATIENT
Start: 2019-01-28 | End: 2019-05-28 | Stop reason: SDUPTHER

## 2019-01-28 NOTE — PROGRESS NOTES
Subjective   Farzana Hawk is a 73 y.o. female.     History of Present Illness     Chronic Anxiety  She has a long history of intermittent nervousness, worrying, and difficulty sleeping.  The symptoms have been worse over the last year as her 's health has declined.  While sad at times she denies any persistent depression nor any suicidal ideation.  She remains on duloxetine with no apparent side effects    Chest Pain  She continues to have occasional chest pain. Episodes continue to occur at night while at rest in bed and generally last about 3-5 minutes. She describes the pain as an anterior pressure. This does not radiate elsewhere and as of late has been unassociated with any other symptoms.. She has been unable to identify and precipitating, aggravating, or relieving factors. She remains active and denies any such symptoms with exertion. She denies any palpitations, change in her shortness of breath with activity, orthopnea, PND, lightheadedness, diaphoresis, or swelling of the ankles. Stress testing performed on 8/22/17 revealed no evidence of inducible ischemia/perfusion defect with an eEF of 70%. She continues to have occasional heartburn described as a burning epigastric and retrosternal burning most frequently following meals and when she lies down at night. She denies any difficulty swallowing and has had no vomiting. She is taking her esomeprazole in the late afternoon/early evening    Asthma  Patient's symptoms include dyspnea, non-productive cough and wheezing. She denies any hemoptysis. The patient has been suffering from these symptoms for a number of years. Symptoms have been unchanged since last here. Medications used in the past to treat these symptoms include beta agonist inhalers, combination beta agonists/steroid inhalers and leukotriene antagonists. Suspected precipitants include upper respiratory infection and allergens.    Headache  Symptoms began many years ago. Generally, the  headaches last several hours and occur once per week on average. The headaches do not seem to be related to any time of the day. The headaches are usually pounding and are located in the forehead bilaterally.  The patient rates her most severe headaches a 10 on a scale from 1 to 10. Recently, the headaches have been stable. Work attendance or other daily activities are affected by the headaches. Precipitating factors include: stress. The headaches are usually not preceded by an aura. The patient denies dizziness, loss of balance, muscle weakness, numbness of extremities, speech difficulties and vision problems. Home treatment has included acetaminophen and codeine with some improvement. Other history includes: migraine headaches diagnosed in the past. Family history includes migraine.    Hypertension  Home blood pressure readings: are consistently at goal. Associated signs and symptoms: chest pain and dyspnea. Patient denies: palpitations, orthopnea, paroxysmal nocturnal dyspnea and peripheral edema. Current antihypertensive medications includes valsartan and hydrochlorothiazide. Medication compliance: taking as prescribed.  She has had no recent labs but is fasting today    The following portions of the patient's history were reviewed and updated as appropriate: allergies, current medications, past medical history, past social history and problem list.    Review of Systems   Constitutional: Positive for fatigue. Negative for appetite change, chills, fever and unexpected weight change.   HENT: Positive for rhinorrhea. Negative for congestion, ear pain, sneezing, sore throat and voice change.    Eyes: Negative for visual disturbance.   Respiratory: Positive for cough, shortness of breath and wheezing.    Cardiovascular: Positive for chest pain. Negative for palpitations and leg swelling.   Gastrointestinal: Negative for abdominal pain, blood in stool, constipation, diarrhea, nausea and vomiting.        Occasional  heartburn   Endocrine: Negative for polydipsia.   Genitourinary: Negative for difficulty urinating, dysuria, frequency, hematuria, menstrual problem, pelvic pain, urgency, vaginal bleeding and vaginal discharge.   Musculoskeletal: Positive for arthralgias and back pain. Negative for joint swelling, myalgias and neck pain.   Skin: Negative for color change.   Neurological: Positive for headaches. Negative for tremors, weakness and numbness.   Psychiatric/Behavioral: Positive for dysphoric mood and sleep disturbance. Negative for suicidal ideas. The patient is nervous/anxious.      Objective   Physical Exam   Constitutional: She is oriented to person, place, and time. No distress.   Looked tired but alert and oriented. No apparent distress. No pallor, jaundice, diaphoresis, or cyanosis.     HENT:   Head: Atraumatic.   Right Ear: Tympanic membrane, external ear and ear canal normal.   Left Ear: Tympanic membrane, external ear and ear canal normal.   Nose: Nose normal.   Mouth/Throat: Oropharynx is clear and moist. Mucous membranes are not pale and not cyanotic.   Eyes: EOM are normal. Pupils are equal, round, and reactive to light. No scleral icterus.   Neck: No JVD present. Carotid bruit is not present. No tracheal deviation present. No thyromegaly present.   Cardiovascular: Normal rate, regular rhythm, S1 normal, S2 normal and intact distal pulses. Exam reveals no gallop, no S3 and no S4.   No murmur heard.  Pulmonary/Chest: Breath sounds normal. No stridor. No respiratory distress.   Abdominal: Soft. Normal aorta and bowel sounds are normal. She exhibits no distension, no abdominal bruit and no mass. There is no hepatosplenomegaly. There is no tenderness. No hernia.   Musculoskeletal: She exhibits no tenderness or deformity.     Vascular Status -  Her right foot exhibits no edema. Her left foot exhibits no edema.  Lymphadenopathy:        Head (right side): No submandibular adenopathy present.        Head (left  side): No submandibular adenopathy present.     She has no cervical adenopathy.   Neurological: She is alert and oriented to person, place, and time. She has normal reflexes. She displays normal reflexes. No cranial nerve deficit. She exhibits normal muscle tone. Coordination normal.   Skin: Skin is warm and dry. No rash noted. She is not diaphoretic. No cyanosis. No pallor. Nails show no clubbing.   Psychiatric: She has a normal mood and affect.     Assessment/Plan   Problems Addressed this Visit        Cardiovascular and Mediastinum    Mixed hyperlipidemia  Encouraged to continue to work on her diet and exercise plan.  Updated labs drawn.    Relevant Orders    Lipid Panel (Completed)    TSH (Completed)    Essential hypertension   Hypertension: at goal. Evidence of target organ damage: none.   As above  Continue current medication    Relevant Orders    CBC & Differential (Completed)    Comprehensive Metabolic Panel (Completed)    CBC Auto Differential (Completed)    Osmolality, Calculated (Completed)    Migraine without aura and without status migrainosus, not intractable  Chronic.  Stable.  Encouraged to report if this should change.    Relevant Medications    acetaminophen-codeine (TYLENOL #3) 300-30 MG per tablet    traMADol (ULTRAM) 50 MG tablet       Respiratory    Extrinsic asthma without complication  Mild persistent asthma. The patient is not currently having an exacerbation. In general, the patient's disease is well controlled.  Discussed monitoring symptoms and use of quick-relief medications and contacting us early in the course of exacerbations.    Chronic seasonal allergic rhinitis       Digestive    Gastroesophageal reflux disease without esophagitis    Vitamin D deficiency  Continue supplementation with monitoring.    Relevant Orders    Vitamin D 25 Hydroxy (Completed)       Musculoskeletal and Integument    Generalized osteoarthritis  Reminded regarding symptomatic treatment.   Continue current  medication    Relevant Medications    acetaminophen-codeine (TYLENOL #3) 300-30 MG per tablet    traMADol (ULTRAM) 50 MG tablet    Osteopenia   Encouraged to continue to pursue weight bearing activities while exercising joint protection.       Other    Recurrent major depressive disorder, in partial remission (CMS/HCC)  Significant situational component.   Supportive therapy.   Continue current medication.  Encouraged to report if any worse or if any new symptoms or concerns.    Healthcare maintenance  Patient has already received a flu shot  Reviewed the potential benefits and risks of hepatitis A immunizations. Patient wished to proceed with this and dose # 1 administered. Patient is aware that a second dose is required in 6 months.  She remains uninterested in a screening colonoscopy but would like to proceed with a colo guard if covered by her insurance.  This will be investigated     Relevant Orders    Hepatitis A Vaccine Adult IM (Completed)    Encounter for immunization    Relevant Orders    Hepatitis A Vaccine Adult IM (Completed)

## 2019-01-28 NOTE — TELEPHONE ENCOUNTER
Faxed order.     ----- Message from Luis Sim MD sent at 1/28/2019  3:45 PM EST -----  Need to find out if insurance covers colo-lorelei and if so arrange

## 2019-03-25 RX ORDER — VALSARTAN AND HYDROCHLOROTHIAZIDE 160; 25 MG/1; MG/1
TABLET ORAL
Qty: 90 TABLET | Refills: 2 | Status: SHIPPED | OUTPATIENT
Start: 2019-03-25 | End: 2019-09-10

## 2019-03-25 RX ORDER — MONTELUKAST SODIUM 10 MG/1
TABLET ORAL
Qty: 90 TABLET | Refills: 2 | Status: SHIPPED | OUTPATIENT
Start: 2019-03-25 | End: 2019-12-20

## 2019-04-08 RX ORDER — LEVOCETIRIZINE DIHYDROCHLORIDE 5 MG/1
TABLET, FILM COATED ORAL
Qty: 90 TABLET | Refills: 2 | Status: SHIPPED | OUTPATIENT
Start: 2019-04-08 | End: 2020-01-03 | Stop reason: SDUPTHER

## 2019-04-29 RX ORDER — ESOMEPRAZOLE MAGNESIUM 40 MG/1
CAPSULE, DELAYED RELEASE ORAL
Qty: 90 CAPSULE | Refills: 2 | Status: SHIPPED | OUTPATIENT
Start: 2019-04-29 | End: 2020-01-24

## 2019-05-28 DIAGNOSIS — M15.9 GENERALIZED OSTEOARTHRITIS: ICD-10-CM

## 2019-05-28 RX ORDER — ACETAMINOPHEN AND CODEINE PHOSPHATE 300; 30 MG/1; MG/1
1 TABLET ORAL 2 TIMES DAILY PRN
Qty: 60 TABLET | Refills: 2 | Status: SHIPPED | OUTPATIENT
Start: 2019-05-28 | End: 2019-05-30 | Stop reason: SDUPTHER

## 2019-05-28 RX ORDER — TRAMADOL HYDROCHLORIDE 50 MG/1
50 TABLET ORAL 4 TIMES DAILY
Qty: 120 TABLET | Refills: 2 | Status: SHIPPED | OUTPATIENT
Start: 2019-05-28 | End: 2019-05-28 | Stop reason: SDUPTHER

## 2019-05-28 RX ORDER — ACETAMINOPHEN AND CODEINE PHOSPHATE 300; 30 MG/1; MG/1
1 TABLET ORAL 2 TIMES DAILY PRN
Qty: 60 TABLET | Refills: 2 | Status: SHIPPED | OUTPATIENT
Start: 2019-05-28 | End: 2019-05-28 | Stop reason: SDUPTHER

## 2019-05-28 RX ORDER — TRAMADOL HYDROCHLORIDE 50 MG/1
50 TABLET ORAL 4 TIMES DAILY
Qty: 120 TABLET | Refills: 2 | Status: SHIPPED | OUTPATIENT
Start: 2019-05-28 | End: 2019-05-30 | Stop reason: SDUPTHER

## 2019-05-30 DIAGNOSIS — M15.9 GENERALIZED OSTEOARTHRITIS: ICD-10-CM

## 2019-05-30 RX ORDER — TRAMADOL HYDROCHLORIDE 50 MG/1
50 TABLET ORAL 4 TIMES DAILY
Qty: 120 TABLET | Refills: 2 | Status: SHIPPED | OUTPATIENT
Start: 2019-05-30 | End: 2019-09-10 | Stop reason: SDUPTHER

## 2019-05-30 RX ORDER — ACETAMINOPHEN AND CODEINE PHOSPHATE 300; 30 MG/1; MG/1
1 TABLET ORAL 2 TIMES DAILY PRN
Qty: 60 TABLET | Refills: 2 | Status: SHIPPED | OUTPATIENT
Start: 2019-05-30 | End: 2019-09-10 | Stop reason: SDUPTHER

## 2019-05-30 NOTE — TELEPHONE ENCOUNTER
[facility]. Could you resend them please?

## 2019-07-24 DIAGNOSIS — E55.9 VITAMIN D DEFICIENCY: ICD-10-CM

## 2019-07-24 RX ORDER — ERGOCALCIFEROL 1.25 MG/1
CAPSULE ORAL
Qty: 4 CAPSULE | Refills: 5 | Status: SHIPPED | OUTPATIENT
Start: 2019-07-24 | End: 2020-03-19

## 2019-09-10 ENCOUNTER — OFFICE VISIT (OUTPATIENT)
Dept: FAMILY MEDICINE CLINIC | Facility: CLINIC | Age: 74
End: 2019-09-10

## 2019-09-10 DIAGNOSIS — J45.40 MODERATE PERSISTENT EXTRINSIC ASTHMA WITHOUT COMPLICATION: ICD-10-CM

## 2019-09-10 DIAGNOSIS — I10 ESSENTIAL HYPERTENSION: ICD-10-CM

## 2019-09-10 DIAGNOSIS — M85.89 OSTEOPENIA OF MULTIPLE SITES: ICD-10-CM

## 2019-09-10 DIAGNOSIS — E78.2 MIXED HYPERLIPIDEMIA: Primary | ICD-10-CM

## 2019-09-10 DIAGNOSIS — K21.9 GASTROESOPHAGEAL REFLUX DISEASE WITHOUT ESOPHAGITIS: ICD-10-CM

## 2019-09-10 DIAGNOSIS — Z23 ENCOUNTER FOR IMMUNIZATION: ICD-10-CM

## 2019-09-10 DIAGNOSIS — G43.009 MIGRAINE WITHOUT AURA AND WITHOUT STATUS MIGRAINOSUS, NOT INTRACTABLE: ICD-10-CM

## 2019-09-10 DIAGNOSIS — E55.9 VITAMIN D DEFICIENCY: ICD-10-CM

## 2019-09-10 DIAGNOSIS — J30.2 CHRONIC SEASONAL ALLERGIC RHINITIS: ICD-10-CM

## 2019-09-10 DIAGNOSIS — Z00.00 HEALTHCARE MAINTENANCE: ICD-10-CM

## 2019-09-10 DIAGNOSIS — F33.41 RECURRENT MAJOR DEPRESSIVE DISORDER, IN PARTIAL REMISSION (HCC): ICD-10-CM

## 2019-09-10 DIAGNOSIS — M15.9 GENERALIZED OSTEOARTHRITIS: ICD-10-CM

## 2019-09-10 PROCEDURE — G0008 ADMIN INFLUENZA VIRUS VAC: HCPCS | Performed by: GENERAL PRACTICE

## 2019-09-10 PROCEDURE — 99214 OFFICE O/P EST MOD 30 MIN: CPT | Performed by: GENERAL PRACTICE

## 2019-09-10 PROCEDURE — 90674 CCIIV4 VAC NO PRSV 0.5 ML IM: CPT | Performed by: GENERAL PRACTICE

## 2019-09-10 RX ORDER — FUROSEMIDE 20 MG/1
20 TABLET ORAL EVERY MORNING
Qty: 90 TABLET | Refills: 3 | Status: SHIPPED | OUTPATIENT
Start: 2019-09-10 | End: 2020-03-19 | Stop reason: SDUPTHER

## 2019-09-10 RX ORDER — ACETAMINOPHEN AND CODEINE PHOSPHATE 300; 30 MG/1; MG/1
1 TABLET ORAL 2 TIMES DAILY PRN
Qty: 60 TABLET | Refills: 2 | Status: SHIPPED | OUTPATIENT
Start: 2019-09-10 | End: 2020-01-20 | Stop reason: SDUPTHER

## 2019-09-10 RX ORDER — TRAMADOL HYDROCHLORIDE 50 MG/1
50 TABLET ORAL 4 TIMES DAILY
Qty: 120 TABLET | Refills: 2 | Status: SHIPPED | OUTPATIENT
Start: 2019-09-10 | End: 2020-01-20 | Stop reason: SDUPTHER

## 2019-09-10 RX ORDER — VALSARTAN 80 MG/1
80 TABLET ORAL NIGHTLY
Qty: 90 TABLET | Refills: 3 | Status: SHIPPED | OUTPATIENT
Start: 2019-09-10 | End: 2020-03-19 | Stop reason: SDUPTHER

## 2019-09-10 NOTE — PROGRESS NOTES
Subjective   Farzana Hawk is a 74 y.o. female.     History of Present Illness     Chronic Anxiety  She has a long history of intermittent nervousness, worrying, and difficulty sleeping.   While sad at times she denies any persistent depression nor any suicidal ideation.  There has been some improvement in her 's health and this has helped.  She tolerated a low-fat low calorie diet early this year and with the weight she is lost has noted a significant improvement in her energy levels and in her overall sense of well-being.  She remains on duloxetine with no apparent side effects  Lab Results   Component Value Date    TSH 2.101 01/28/2019     Chest Pain  She continues to have occasional chest pain. Episodes continue to occur at night while at rest in bed and generally last about 3-5 minutes. She describes the pain as an anterior pressure. This does not radiate elsewhere and has been unassociated with any other symptoms. She has been unable to identify and precipitating, or aggravating factors.  She has been taking her  furosemide 40 mg tablets every second or third day and feels this has helped significantly.  She remains active and denies any symptoms with exertion. She denies any palpitations, change in her shortness of breath with activity, orthopnea, PND, lightheadedness, diaphoresis, or swelling of the ankles. Stress testing performed on 8/22/17 revealed no evidence of inducible ischemia/perfusion defect with an eEF of 70%.     Asthma  Patient's symptoms include dyspnea, non-productive cough and wheezing. She denies any hemoptysis. The patient has been suffering from these symptoms for a number of years. Symptoms have been unchanged since last here. Medications used in the past to treat these symptoms include beta agonist inhalers, combination beta agonists/steroid inhalers and leukotriene antagonists. Suspected precipitants include upper respiratory infection and  allergens.    Headache  Symptoms began many years ago. Generally, the headaches last several hours and occur once per week on average. The headaches do not seem to be related to any time of the day. The headaches are usually pounding and are located in the forehead bilaterally.  The patient rates her most severe headaches a 10 on a scale from 1 to 10. Recently, the headaches have been stable. Work attendance or other daily activities are affected by the headaches. Precipitating factors include: stress. The headaches are usually not preceded by an aura. The patient denies dizziness, loss of balance, muscle weakness, numbness of extremities, speech difficulties and vision problems. Home treatment has included acetaminophen and codeine with some improvement. Other history includes: migraine headaches diagnosed in the past. Family history includes migraine.  Lab Results   Component Value Date    WBC 6.80 01/28/2019    HGB 12.5 01/28/2019    HCT 38.6 01/28/2019    MCV 90.4 01/28/2019     01/28/2019     Hypertension  Home blood pressure readings: are consistently at goal. Associated signs and symptoms: chest pain and dyspnea. Patient denies: palpitations, orthopnea, paroxysmal nocturnal dyspnea and peripheral edema. Current antihypertensive medications includes valsartan and hydrochlorothiazide. Medication compliance: taking as prescribed.    Lab Results   Component Value Date    CREATININE 0.94 01/28/2019     Lab Results   Component Value Date    K 4.5 01/28/2019     Lab Results   Component Value Date    CHOL 194 01/28/2019    TRIG 72 01/28/2019    HDL 62 01/28/2019     (H) 01/28/2019     Labs  Most recent vitamin D 38    The following portions of the patient's history were reviewed and updated as appropriate: allergies, current medications, past medical history, past social history and problem list.    Review of Systems   Constitutional: Positive for fatigue. Negative for appetite change, chills, fever and  unexpected weight change.   HENT: Positive for rhinorrhea. Negative for congestion, ear pain, sneezing, sore throat and voice change.    Eyes: Negative for visual disturbance.   Respiratory: Positive for cough, shortness of breath and wheezing.    Cardiovascular: Positive for chest pain. Negative for palpitations and leg swelling.   Gastrointestinal: Negative for abdominal pain, blood in stool, constipation, diarrhea, nausea and vomiting.        Occasional heartburn   Endocrine: Negative for polydipsia.   Genitourinary: Negative for difficulty urinating, dysuria, frequency, hematuria, menstrual problem, pelvic pain, urgency, vaginal bleeding and vaginal discharge.   Musculoskeletal: Positive for arthralgias and back pain. Negative for joint swelling, myalgias and neck pain.   Skin: Negative for color change.   Neurological: Positive for headaches. Negative for tremors, weakness and numbness.   Psychiatric/Behavioral: Positive for dysphoric mood and sleep disturbance. Negative for suicidal ideas. The patient is nervous/anxious.      Objective   Physical Exam   Constitutional: She is oriented to person, place, and time. No distress.   Accompanied by her .  Bright and in fair spirits. No apparent distress. No pallor, jaundice, diaphoresis, or cyanosis.     HENT:   Head: Atraumatic.   Right Ear: Tympanic membrane, external ear and ear canal normal.   Left Ear: Tympanic membrane, external ear and ear canal normal.   Nose: Nose normal.   Mouth/Throat: Oropharynx is clear and moist. Mucous membranes are not pale and not cyanotic.   Eyes: EOM are normal. Pupils are equal, round, and reactive to light. No scleral icterus.   Neck: No JVD present. Carotid bruit is not present. No tracheal deviation present. No thyromegaly present.   Cardiovascular: Normal rate, regular rhythm, S1 normal, S2 normal and intact distal pulses. Exam reveals no gallop, no S3 and no S4.   No murmur heard.  Pulmonary/Chest: Breath sounds  normal. No stridor. No respiratory distress.   Abdominal: Soft. Normal aorta and bowel sounds are normal. She exhibits no distension, no abdominal bruit and no mass. There is no hepatosplenomegaly. There is no tenderness. No hernia.   Musculoskeletal: She exhibits no tenderness or deformity.     Vascular Status -  Her right foot exhibits no edema. Her left foot exhibits no edema.  Lymphadenopathy:        Head (right side): No submandibular adenopathy present.        Head (left side): No submandibular adenopathy present.     She has no cervical adenopathy.   Neurological: She is alert and oriented to person, place, and time. She has normal reflexes. She displays normal reflexes. No cranial nerve deficit. She exhibits normal muscle tone. Coordination normal.   Skin: Skin is warm and dry. No rash noted. She is not diaphoretic. No cyanosis. No pallor. Nails show no clubbing.   Psychiatric: She has a normal mood and affect.     Assessment/Plan   Problems Addressed this Visit        Cardiovascular and Mediastinum    Mixed hyperlipidemia   Encouraged to continue to work on her diet and exercise plan.    Essential hypertension  As above.  Hydrochlorothiazide will be replaced with furosemide along with a reduction in the dose of valsartan  Updated labs will be arranged at return.    Relevant Medications    valsartan (DIOVAN) 80 MG tablet    furosemide (LASIX) 20 MG tablet    Migraine without aura and without status migrainosus, not intractable  Chronic.    Stable.  Encouraged to report if this should change    Relevant Medications    acetaminophen-codeine (TYLENOL #3) 300-30 MG per tablet    traMADol (ULTRAM) 50 MG tablet       Respiratory    Extrinsic asthma without complication  Mild persistent asthma. The patient is not currently having an exacerbation. In general, the patient's disease is well controlled.  Discussed monitoring symptoms and use of quick-relief medications and contacting us early in the course of  exacerbations.    Chronic seasonal allergic rhinitis       Digestive    Gastroesophageal reflux disease without esophagitis   Symptoms are currently stable.  Reminded regarding lifestyle modification.  Continue current medication.    Vitamin D deficiency  Continue supplementation with monitoring.       Musculoskeletal and Integument    Generalized osteoarthritis    Relevant Medications    acetaminophen-codeine (TYLENOL #3) 300-30 MG per tablet    traMADol (ULTRAM) 50 MG tablet    Osteopenia of multiple sites       Other    Recurrent major depressive disorder, in partial remission (CMS/HCC)  Significant situational component.   Supportive therapy.   Continue current medication.    Healthcare maintenance  Recommended a flu shot  Prescription written for hepatitis A #2  Patient remains uninterested in breast or colon cancer screening    Relevant Orders    Flucelvax Quad=>4Years (PFS) (Completed)

## 2019-09-11 VITALS
OXYGEN SATURATION: 97 % | HEART RATE: 74 BPM | HEIGHT: 62 IN | WEIGHT: 147 LBS | TEMPERATURE: 99.4 F | DIASTOLIC BLOOD PRESSURE: 65 MMHG | SYSTOLIC BLOOD PRESSURE: 110 MMHG | BODY MASS INDEX: 27.05 KG/M2 | RESPIRATION RATE: 12 BRPM

## 2019-10-21 DIAGNOSIS — M15.9 GENERALIZED OSTEOARTHRITIS: ICD-10-CM

## 2019-10-22 RX ORDER — ACETAMINOPHEN AND CODEINE PHOSPHATE 300; 30 MG/1; MG/1
TABLET ORAL
Qty: 60 TABLET | Refills: 2 | OUTPATIENT
Start: 2019-10-22

## 2019-10-22 RX ORDER — TRAMADOL HYDROCHLORIDE 50 MG/1
TABLET ORAL
Qty: 120 TABLET | Refills: 2 | OUTPATIENT
Start: 2019-10-22

## 2019-11-18 ENCOUNTER — OFFICE VISIT (OUTPATIENT)
Dept: FAMILY MEDICINE CLINIC | Facility: CLINIC | Age: 74
End: 2019-11-18

## 2019-11-18 DIAGNOSIS — I10 ESSENTIAL HYPERTENSION: ICD-10-CM

## 2019-11-18 DIAGNOSIS — R06.02 SHORTNESS OF BREATH: ICD-10-CM

## 2019-11-18 DIAGNOSIS — J45.41 MODERATE PERSISTENT ASTHMA WITH ACUTE EXACERBATION: Primary | ICD-10-CM

## 2019-11-18 DIAGNOSIS — F33.41 RECURRENT MAJOR DEPRESSIVE DISORDER, IN PARTIAL REMISSION (HCC): ICD-10-CM

## 2019-11-18 DIAGNOSIS — R29.898 WEAKNESS OF BOTH LOWER EXTREMITIES: ICD-10-CM

## 2019-11-18 DIAGNOSIS — H53.9 VISION CHANGES: ICD-10-CM

## 2019-11-18 DIAGNOSIS — K04.7 ABSCESSED TOOTH: ICD-10-CM

## 2019-11-18 DIAGNOSIS — R51.9 NEW ONSET HEADACHE: ICD-10-CM

## 2019-11-18 PROCEDURE — 93000 ELECTROCARDIOGRAM COMPLETE: CPT | Performed by: PHYSICIAN ASSISTANT

## 2019-11-18 PROCEDURE — 99214 OFFICE O/P EST MOD 30 MIN: CPT | Performed by: PHYSICIAN ASSISTANT

## 2019-11-18 RX ORDER — BUDESONIDE AND FORMOTEROL FUMARATE DIHYDRATE 160; 4.5 UG/1; UG/1
2 AEROSOL RESPIRATORY (INHALATION)
Qty: 10.2 G | Refills: 0 | COMMUNITY
Start: 2019-11-18 | End: 2020-03-19

## 2019-11-18 NOTE — PROGRESS NOTES
Subjective   Farzana Hawk is a 74 y.o. female.       Chief Complaint -shortness of breath    History of Present Illness -      Shortness of breath-  She complains of shortness of breath, right sided chest pain that is described as a moderate tightness that radiates around to her right thoracic back, wheezing and fever for the past 5 days.    She states that she saw her dentist today because she was supposed to have a root canal to her left incisor.  She reports that her dentist stated she had an infection and has already sent a prescription for clindamycin 3 times daily #30 to her pharmacy but she has not yet started it.  Her left root canal was rescheduled for 10 days from now.    Asthma-not at goal due to acute exacerbation/shortness of breath    Hypertension-stable with Diovan and Lasix    Depression-stable with Cymbalta.  She denies any SI or HI.    The following portions of the patient's history were reviewed and updated as appropriate: allergies, current medications, past family history, past medical history, past social history, past surgical history and problem list.    Review of Systems   Constitutional: Positive for chills and fever. Negative for activity change and appetite change.   HENT: Positive for congestion, sinus pain and sore throat. Negative for ear pain.         Mouth pain   Eyes: Negative for pain and visual disturbance.   Respiratory: Positive for cough, chest tightness, shortness of breath and wheezing.    Cardiovascular: Negative for chest pain and palpitations.   Gastrointestinal: Negative for abdominal pain, constipation, diarrhea, nausea and vomiting.   Endocrine: Negative for polydipsia and polyuria.   Genitourinary: Negative for dysuria and frequency.   Musculoskeletal: Negative for back pain and myalgias.   Skin: Negative for color change and rash.   Allergic/Immunologic: Negative for food allergies and immunocompromised state.   Neurological: Negative for dizziness, syncope and  headaches.   Hematological: Negative for adenopathy. Does not bruise/bleed easily.   Psychiatric/Behavioral: Negative for hallucinations and suicidal ideas. The patient is not nervous/anxious.        There were no vitals taken for this visit.  Office Visit on 01/28/2019   Component Date Value Ref Range Status   • Glucose 01/28/2019 83  70 - 110 mg/dL Final   • BUN 01/28/2019 12  7 - 21 mg/dL Final   • Creatinine 01/28/2019 0.94  0.43 - 1.29 mg/dL Final   • Sodium 01/28/2019 142  135 - 153 mmol/L Final   • Potassium 01/28/2019 4.5  3.5 - 5.3 mmol/L Final   • Chloride 01/28/2019 111  99 - 112 mmol/L Final   • CO2 01/28/2019 24.4  24.3 - 31.9 mmol/L Final   • Calcium 01/28/2019 9.5  7.7 - 10.0 mg/dL Final   • Total Protein 01/28/2019 6.8  6.0 - 8.0 g/dL Final   • Albumin 01/28/2019 4.20  3.40 - 4.80 g/dL Final   • ALT (SGPT) 01/28/2019 10  10 - 36 U/L Final   • AST (SGOT) 01/28/2019 15  10 - 30 U/L Final   • Alkaline Phosphatase 01/28/2019 82  35 - 104 U/L Final   • Total Bilirubin 01/28/2019 0.7  0.2 - 1.8 mg/dL Final   • eGFR Non African Amer 01/28/2019 58* >60 mL/min/1.73 Final   • Globulin 01/28/2019 2.6  gm/dL Final   • A/G Ratio 01/28/2019 1.6  1.5 - 2.5 g/dL Final   • BUN/Creatinine Ratio 01/28/2019 12.8  7.0 - 25.0 Final   • Anion Gap 01/28/2019 6.6  3.6 - 11.2 mmol/L Final   • Total Cholesterol 01/28/2019 194  0 - 200 mg/dL Final   • Triglycerides 01/28/2019 72  0 - 150 mg/dL Final   • HDL Cholesterol 01/28/2019 62  60 - 100 mg/dL Final   • LDL Cholesterol  01/28/2019 118* 0 - 100 mg/dL Final   • VLDL Cholesterol 01/28/2019 14.4  mg/dL Final   • LDL/HDL Ratio 01/28/2019 1.90   Final   • TSH 01/28/2019 2.101  0.550 - 4.780 mIU/mL Final   • 25 Hydroxy, Vitamin D 01/28/2019 38.0  ng/ml Final   • WBC 01/28/2019 6.80  4.50 - 12.50 10*3/mm3 Final   • RBC 01/28/2019 4.27  4.20 - 5.40 10*6/mm3 Final   • Hemoglobin 01/28/2019 12.5  12.0 - 16.0 g/dL Final   • Hematocrit 01/28/2019 38.6  37.0 - 47.0 % Final   • MCV  01/28/2019 90.4  80.0 - 94.0 fL Final   • MCH 01/28/2019 29.3  27.0 - 33.0 pg Final   • MCHC 01/28/2019 32.4* 33.0 - 37.0 g/dL Final   • RDW 01/28/2019 12.9  11.5 - 14.5 % Final   • RDW-SD 01/28/2019 42.0  37.0 - 54.0 fl Final   • MPV 01/28/2019 10.9* 6.0 - 10.0 fL Final   • Platelets 01/28/2019 227  130 - 400 10*3/mm3 Final   • Neutrophil % 01/28/2019 58.6  40.0 - 75.0 % Final   • Lymphocyte % 01/28/2019 30.1  16.0 - 46.0 % Final   • Monocyte % 01/28/2019 6.2  0.0 - 12.0 % Final   • Eosinophil % 01/28/2019 4.3  0.0 - 7.0 % Final   • Basophil % 01/28/2019 0.7  0.0 - 2.0 % Final   • Immature Grans % 01/28/2019 0.1  0.0 - 0.5 % Final   • Neutrophils, Absolute 01/28/2019 3.98  1.40 - 6.50 10*3/mm3 Final   • Lymphocytes, Absolute 01/28/2019 2.05  1.00 - 3.00 10*3/mm3 Final   • Monocytes, Absolute 01/28/2019 0.42  0.10 - 0.90 10*3/mm3 Final   • Eosinophils, Absolute 01/28/2019 0.29  0.00 - 0.70 10*3/mm3 Final   • Basophils, Absolute 01/28/2019 0.05  0.00 - 0.30 10*3/mm3 Final   • Immature Grans, Absolute 01/28/2019 0.01  0.00 - 0.03 10*3/mm3 Final   • Osmolality Calc 01/28/2019 282.0  273.0 - 305.0 mOsm/kg Final   • Hepatitis C Ab 01/28/2019 Non-Reactive  Non-Reactive Final       Physical Exam   Constitutional: She is oriented to person, place, and time. She appears well-developed and well-nourished.   HENT:   Head: Normocephalic and atraumatic.   Mouth/Throat: No oropharyngeal exudate.   Oropharynx erythematous without exudates.swelling noted medical left max sinus with tenderness.     Eyes: Conjunctivae and EOM are normal. Pupils are equal, round, and reactive to light.   Neck: Normal range of motion. Neck supple. No tracheal deviation present. No thyromegaly present.   Cardiovascular: Normal rate, regular rhythm, normal heart sounds and intact distal pulses.   No murmur heard.  Pulmonary/Chest: Effort normal. No respiratory distress. She has wheezes.   Mild wheezing throughout lung fields   Abdominal: Soft. Bowel  sounds are normal. There is no tenderness. There is no guarding.   Musculoskeletal: Normal range of motion. She exhibits no edema or tenderness.   Lymphadenopathy:     She has no cervical adenopathy.   Neurological: She is alert and oriented to person, place, and time.   Skin: Skin is warm and dry. No rash noted.   Psychiatric: She has a normal mood and affect. Her behavior is normal.   Nursing note and vitals reviewed.      Assessment/Plan     Diagnoses and all orders for this visit:    Moderate persistent asthma with acute exacerbation  Comments:  Advised to use clindamycin 3 times daily #30 written by her dentist  Start clindamycin today  Orders:  -     budesonide-formoterol (SYMBICORT) 160-4.5 MCG/ACT inhaler; Inhale 2 puffs 2 (Two) Times a Day.    Shortness of breath  Comments:  Due to uncontrolled asthma    Orders:  -     ECG 12 Lead  -     budesonide-formoterol (SYMBICORT) 160-4.5 MCG/ACT inhaler; Inhale 2 puffs 2 (Two) Times a Day.    Essential hypertension  Comments:  Controlled with Diovan and Lasix    Recurrent major depressive disorder, in partial remission (CMS/HCC)  Comments:  Continue Cymbalta    Abscessed tooth  Comments:  Advised to take clindamycin as prescribed by her dentist.    November 18, 2019 and 11:31 AM  EKG  Performed by: Suzanne Santamaria CMA  Interpreted by: Zaira Ward PA-C  No previous EKG available for comparison  Compared with interpretation of EKG from stress test 8/8/2017 which showed normal sinus rhythm and no ST segment changes  Rhythm: Sinus rhythm  Rate: Normal at 74 bpm  QRS Axis: Normal  Conduction: Normal  ST segments: ST segments normal  T waves: T waves normal  Clinical impression: Normal sinus rhythm/normal EKG           This document has been electronically signed by:  Zaira Ward PA-C

## 2019-12-20 RX ORDER — MONTELUKAST SODIUM 10 MG/1
TABLET ORAL
Qty: 90 TABLET | Refills: 4 | Status: SHIPPED | OUTPATIENT
Start: 2019-12-20 | End: 2020-03-19 | Stop reason: SDUPTHER

## 2020-01-03 RX ORDER — LEVOCETIRIZINE DIHYDROCHLORIDE 5 MG/1
TABLET, FILM COATED ORAL
Qty: 90 TABLET | Refills: 4 | Status: SHIPPED | OUTPATIENT
Start: 2020-01-03 | End: 2020-03-19 | Stop reason: SDUPTHER

## 2020-01-20 DIAGNOSIS — M15.9 GENERALIZED OSTEOARTHRITIS: ICD-10-CM

## 2020-01-20 RX ORDER — TRAMADOL HYDROCHLORIDE 50 MG/1
50 TABLET ORAL 4 TIMES DAILY
Qty: 120 TABLET | Refills: 0 | Status: SHIPPED | OUTPATIENT
Start: 2020-01-20 | End: 2020-03-19 | Stop reason: SDUPTHER

## 2020-01-20 RX ORDER — ACETAMINOPHEN AND CODEINE PHOSPHATE 300; 30 MG/1; MG/1
1 TABLET ORAL 2 TIMES DAILY PRN
Qty: 60 TABLET | Refills: 0 | Status: SHIPPED | OUTPATIENT
Start: 2020-01-20 | End: 2020-04-24

## 2020-01-24 RX ORDER — ESOMEPRAZOLE MAGNESIUM 40 MG/1
CAPSULE, DELAYED RELEASE ORAL
Qty: 90 CAPSULE | Refills: 4 | Status: SHIPPED | OUTPATIENT
Start: 2020-01-24 | End: 2020-03-19 | Stop reason: SDUPTHER

## 2020-03-10 ENCOUNTER — OFFICE VISIT (OUTPATIENT)
Dept: FAMILY MEDICINE CLINIC | Facility: CLINIC | Age: 75
End: 2020-03-10

## 2020-03-10 VITALS
DIASTOLIC BLOOD PRESSURE: 90 MMHG | WEIGHT: 151 LBS | HEIGHT: 62 IN | HEART RATE: 82 BPM | OXYGEN SATURATION: 96 % | TEMPERATURE: 97.8 F | SYSTOLIC BLOOD PRESSURE: 140 MMHG | BODY MASS INDEX: 27.79 KG/M2

## 2020-03-10 DIAGNOSIS — R51.9 NEW ONSET HEADACHE: ICD-10-CM

## 2020-03-10 DIAGNOSIS — H53.9 VISION CHANGES: ICD-10-CM

## 2020-03-10 DIAGNOSIS — R29.898 WEAKNESS OF BOTH LOWER EXTREMITIES: Primary | ICD-10-CM

## 2020-03-10 PROCEDURE — 99214 OFFICE O/P EST MOD 30 MIN: CPT | Performed by: NURSE PRACTITIONER

## 2020-03-10 NOTE — PROGRESS NOTES
"Subjective   Farzana Hawk is a 74 y.o. female.     Chief Complaint   Patient presents with   • Hypertension       History of Present Illness     Hypertension-patient ananth Everett presents to the office today with concerns regarding hypertension.  She is presently on Diovan 80 mg daily and Lasix 20 mg.   She reports began approx 5 days ago.  She reports she \"was unable to walk or drive well\".  Some dizziness was present.  She reports her legs felt weak.  She reports FERREIRA has been present with nausea.  She reports some CP with elevations.  CP Has been intermittent.  She reports she has not noted any other symptoms other than \"I felt really bad\".  Her BP at home has been as much as 170's SBP and 100 DBP.  She reports she has never had this difficulty before.  Family member present reports she has been mildly confused.  She reports a lot of pressure \"in my head\" holding to her left frontal area with some intermittent vision changes.  No tinnitus.  Some mild pressure in back of head.    Asthma-chronic and ongoing.  Patient is presently on PRN albuterol and Symbicort 160 but \"not like I should\".  She is also on Singulair 10 mg daily.  She does take Xyzal 5 mg and Nasonex nasal spray daily as well. She does feel she has noted some SOA \"over the last 5 days\".  Not at goal.    The following portions of the patient's history were reviewed and updated as appropriate: CC, ROS, allergies, current medications, past family history, past medical history, past social history, past surgical history and problem list.    Review of Systems   Constitutional: Positive for fatigue. Negative for appetite change and unexpected weight change.   HENT: Negative for congestion, ear pain, nosebleeds, postnasal drip, rhinorrhea, sore throat, trouble swallowing and voice change.    Eyes: Positive for visual disturbance. Negative for photophobia and pain.   Respiratory: Negative for cough, chest tightness, shortness of breath and wheezing.  " "  Cardiovascular: Negative for chest pain and palpitations.   Gastrointestinal: Negative for abdominal pain, blood in stool, constipation, diarrhea and nausea.   Endocrine: Negative for cold intolerance and polydipsia.   Genitourinary: Negative for difficulty urinating, flank pain, frequency and hematuria.   Musculoskeletal: Positive for gait problem. Negative for arthralgias, back pain, joint swelling and myalgias.   Skin: Negative for color change and rash.   Allergic/Immunologic: Negative.    Neurological: Positive for dizziness, weakness (generalized), light-headedness and headaches. Negative for syncope and numbness.   Hematological: Negative.    Psychiatric/Behavioral: Positive for confusion. Negative for dysphoric mood, sleep disturbance and suicidal ideas. The patient is not nervous/anxious.    All other systems reviewed and are negative.      Objective     /90   Pulse 82   Temp 97.8 °F (36.6 °C) (Temporal)   Ht 157.5 cm (62\")   Wt 68.5 kg (151 lb)   SpO2 96%   BMI 27.62 kg/m²     Physical Exam   Constitutional: She is oriented to person, place, and time. She appears well-developed and well-nourished. No distress.   HENT:   Head: Normocephalic and atraumatic.       Right Ear: External ear normal.   Left Ear: External ear normal.   Nose: Nose normal.   Mouth/Throat: Oropharynx is clear and moist.   Eyes: Conjunctivae and EOM are normal. No scleral icterus.   Pupils reactive to light.  Left pupil slow to return back to normal   Neck: Normal range of motion. Neck supple. No JVD present. No thyromegaly present.       Cardiovascular: Normal rate, regular rhythm and normal heart sounds.   Pulmonary/Chest: Effort normal and breath sounds normal.   Abdominal: Soft. Bowel sounds are normal. There is no tenderness.   Musculoskeletal:   Generalized weakness.  Required assist of staff member to stand upright then to be able to sit on table.  Patient with difficulty scooting herself back from edge of table. "   Neurological: She is alert and oriented to person, place, and time. No cranial nerve deficit. Coordination normal.   Skin: Skin is warm and dry.   Psychiatric: She has a normal mood and affect. Her speech is normal and behavior is normal. Judgment and thought content normal.   Delayed thought to speech process noted.  Patient appears to be having to think hard before being able to respond to question She is attentive.   Vitals reviewed.      Assessment/Plan     Problem List Items Addressed This Visit     None      Visit Diagnoses     Weakness of both lower extremities    -  Primary    Relevant Orders    CT Head Without Contrast (Completed)    Vision changes        Relevant Orders    CT Head Without Contrast (Completed)    New onset headache        Relevant Orders    CT Head Without Contrast (Completed)        Patient's Body mass index is 27.62 kg/m². BMI is above normal parameters. Recommendations include: nutrition counseling.  Understands disease processes and need for medications.  Understands reasons for urgent and emergent care.  Patient (& family) verbalized agreement for treatment plan.   Emotional support and active listening provided.  Patient provided time to verbalize feelings.    Insurance contacted.  Certification number received no authorization required for insurance for stat CT of head  Patient wishes to have procedure done at [facility] notified of patient coming for scan.      RTC PRN 3-5 days for worsening or non resolving symptoms            This document has been electronically signed by:  RADHA Calderon, FNP-C    Dragon disclaimer:  Much of this encounter note is an electronic transcription/translation of spoken language to printed text. The electronic translation of spoken language may permit erroneous, or at times, nonsensical words or phrases to be inadvertently transcribed; Although I have reviewed the note for such errors, some may still exist.

## 2020-03-18 ENCOUNTER — READMISSION MANAGEMENT (OUTPATIENT)
Dept: CALL CENTER | Facility: HOSPITAL | Age: 75
End: 2020-03-18

## 2020-03-18 NOTE — OUTREACH NOTE
Prep Survey      Responses   [facility] patient discharged from?  Non-[facility]   Is LACE score < 7 ?  Non[facility] Discharge   [facility]   Discharge diagnosis  unknown   Does the patient have one of the following disease processes/diagnoses(primary or secondary)?  Non-[facility] Discharge   Prep survey completed?  Yes          Amena Kothari RN

## 2020-03-19 ENCOUNTER — OFFICE VISIT (OUTPATIENT)
Dept: FAMILY MEDICINE CLINIC | Facility: CLINIC | Age: 75
End: 2020-03-19

## 2020-03-19 VITALS
TEMPERATURE: 98.2 F | SYSTOLIC BLOOD PRESSURE: 122 MMHG | OXYGEN SATURATION: 98 % | WEIGHT: 156 LBS | RESPIRATION RATE: 14 BRPM | HEART RATE: 71 BPM | BODY MASS INDEX: 28.71 KG/M2 | DIASTOLIC BLOOD PRESSURE: 60 MMHG | HEIGHT: 62 IN

## 2020-03-19 DIAGNOSIS — J45.41 MODERATE PERSISTENT ASTHMA WITH ACUTE EXACERBATION: ICD-10-CM

## 2020-03-19 DIAGNOSIS — J30.2 CHRONIC SEASONAL ALLERGIC RHINITIS: ICD-10-CM

## 2020-03-19 DIAGNOSIS — E78.2 MIXED HYPERLIPIDEMIA: ICD-10-CM

## 2020-03-19 DIAGNOSIS — R06.02 SHORTNESS OF BREATH: ICD-10-CM

## 2020-03-19 DIAGNOSIS — I10 ESSENTIAL HYPERTENSION: ICD-10-CM

## 2020-03-19 DIAGNOSIS — G43.009 MIGRAINE WITHOUT AURA AND WITHOUT STATUS MIGRAINOSUS, NOT INTRACTABLE: Primary | ICD-10-CM

## 2020-03-19 DIAGNOSIS — M85.89 OSTEOPENIA OF MULTIPLE SITES: ICD-10-CM

## 2020-03-19 DIAGNOSIS — F33.41 RECURRENT MAJOR DEPRESSIVE DISORDER, IN PARTIAL REMISSION (HCC): ICD-10-CM

## 2020-03-19 DIAGNOSIS — S06.5XAA SUBDURAL HEMATOMA (HCC): ICD-10-CM

## 2020-03-19 DIAGNOSIS — K21.9 GASTROESOPHAGEAL REFLUX DISEASE WITHOUT ESOPHAGITIS: ICD-10-CM

## 2020-03-19 DIAGNOSIS — M15.9 GENERALIZED OSTEOARTHRITIS: ICD-10-CM

## 2020-03-19 DIAGNOSIS — Z00.00 HEALTHCARE MAINTENANCE: ICD-10-CM

## 2020-03-19 DIAGNOSIS — J45.40 MODERATE PERSISTENT EXTRINSIC ASTHMA WITHOUT COMPLICATION: ICD-10-CM

## 2020-03-19 PROCEDURE — 99214 OFFICE O/P EST MOD 30 MIN: CPT | Performed by: GENERAL PRACTICE

## 2020-03-19 RX ORDER — LEVOCETIRIZINE DIHYDROCHLORIDE 5 MG/1
5 TABLET, FILM COATED ORAL DAILY
Qty: 90 TABLET | Refills: 3 | Status: SHIPPED | OUTPATIENT
Start: 2020-03-19 | End: 2020-05-19 | Stop reason: SDUPTHER

## 2020-03-19 RX ORDER — BUDESONIDE AND FORMOTEROL FUMARATE DIHYDRATE 80; 4.5 UG/1; UG/1
2 AEROSOL RESPIRATORY (INHALATION)
Qty: 3 INHALER | Refills: 3 | Status: SHIPPED | OUTPATIENT
Start: 2020-03-19 | End: 2020-05-19

## 2020-03-19 RX ORDER — BUTALBITAL, ACETAMINOPHEN AND CAFFEINE 300; 40; 50 MG/1; MG/1; MG/1
1 CAPSULE ORAL EVERY 6 HOURS PRN
Qty: 30 CAPSULE | Refills: 0 | Status: SHIPPED | OUTPATIENT
Start: 2020-03-19 | End: 2020-05-19

## 2020-03-19 RX ORDER — ESOMEPRAZOLE MAGNESIUM 40 MG/1
40 CAPSULE, DELAYED RELEASE ORAL DAILY
Qty: 90 CAPSULE | Refills: 3 | Status: SHIPPED | OUTPATIENT
Start: 2020-03-19 | End: 2020-05-19

## 2020-03-19 RX ORDER — FUROSEMIDE 20 MG/1
20 TABLET ORAL EVERY MORNING
Qty: 90 TABLET | Refills: 3 | Status: SHIPPED | OUTPATIENT
Start: 2020-03-19 | End: 2020-05-19 | Stop reason: SDUPTHER

## 2020-03-19 RX ORDER — MONTELUKAST SODIUM 10 MG/1
10 TABLET ORAL DAILY
Qty: 90 TABLET | Refills: 3 | Status: SHIPPED | OUTPATIENT
Start: 2020-03-19 | End: 2020-05-19 | Stop reason: SDUPTHER

## 2020-03-19 RX ORDER — VALSARTAN 80 MG/1
80 TABLET ORAL NIGHTLY
Qty: 90 TABLET | Refills: 3 | Status: SHIPPED | OUTPATIENT
Start: 2020-03-19 | End: 2020-05-19 | Stop reason: SDUPTHER

## 2020-03-19 RX ORDER — MOMETASONE FUROATE 50 UG/1
2 SPRAY, METERED NASAL DAILY
Qty: 3 EACH | Refills: 3 | Status: SHIPPED | OUTPATIENT
Start: 2020-03-19 | End: 2020-05-19

## 2020-03-19 RX ORDER — ALBUTEROL SULFATE 90 UG/1
2 AEROSOL, METERED RESPIRATORY (INHALATION) EVERY 4 HOURS PRN
Qty: 3 INHALER | Refills: 3 | Status: SHIPPED | OUTPATIENT
Start: 2020-03-19 | End: 2020-05-19

## 2020-03-19 RX ORDER — TRAMADOL HYDROCHLORIDE 50 MG/1
50 TABLET ORAL 4 TIMES DAILY
Qty: 120 TABLET | Refills: 2 | Status: SHIPPED | OUTPATIENT
Start: 2020-03-19 | End: 2021-01-25 | Stop reason: SDUPTHER

## 2020-03-19 NOTE — PROGRESS NOTES
Subjective   Farzana Hawk is a 74 y.o. female.     History of Present Illness     Hospital Follow-Up  Discharge from [facility] on 3/14/2020 after undergoing evacuation of a left subdural hematoma.  Her course in hospital was uncomplicated and she was ultimately discharged home on her preadmission medications with the addition of butalbital-acetaminophen-caffeine 1 every 4 hours as needed and the discontinuation of ASA.  She has had daily headaches described as a generalized throb much like those she has had in the past.  She has had little if any incisional pain and denies any problems with her speech, vision, strength, or sensation.  There is no history of any nausea, vomiting, or diarrhea.  While she admits to shortness of breath with exertion this has been no worse than at baseline and she denies any orthopnea, PND, chest pain, cough, calf pain, or some of the ankles.  There is no history of any fever, chills, or night sweats.  She adamantly denies any head injury in the last year. She anticipates undergoing a reassessment with Dr. Martinez in 2 weeks.    Asthma  Patient's symptoms include shortness of breath on exertion.  There is no history of any recent cough or wheezing and she denies any chest pain or hemoptysis. The patient has been suffering from these symptoms for a number of years and is usually worse in the spring. Symptoms have been well controlled since last here and she has not used albuterol since her discharge from hospital.  She remains on montelukast but is no longer on an ICS-LABA.  Suspected precipitants include upper respiratory infection and allergens.    Hypertension  Home blood pressure readings: are consistently at goal. Associated signs and symptoms: chest pain and dyspnea. Patient denies: palpitations, orthopnea, paroxysmal nocturnal dyspnea and peripheral edema. Current antihypertensive medications includes valsartan and hydrochlorothiazide. Medication compliance: taking as prescribed.       Chronic Anxiety  She has a long history of intermittent nervousness, worrying, and difficulty sleeping.   While sad at times she denies any persistent depression nor any suicidal ideation.  She stopped duloxetine sometime ago    The following portions of the patient's history were reviewed and updated as appropriate: allergies, current medications, past medical history, past social history, past surgical history and problem list.    Review of Systems   Constitutional: Positive for fatigue. Negative for appetite change, chills, fever and unexpected weight change.   HENT: Positive for rhinorrhea. Negative for congestion, ear pain, sneezing, sore throat and voice change.    Eyes: Negative for visual disturbance.   Respiratory: Positive for cough. Negative for shortness of breath and wheezing.    Cardiovascular: Negative for chest pain, palpitations and leg swelling.   Gastrointestinal: Negative for abdominal pain, blood in stool, constipation, diarrhea, nausea and vomiting.   Genitourinary: Negative for difficulty urinating, dysuria, frequency, hematuria and urgency.   Musculoskeletal: Positive for arthralgias and back pain. Negative for joint swelling, myalgias and neck pain.   Skin: Positive for wound (left scalp). Negative for rash.   Neurological: Positive for headaches. Negative for weakness and numbness.   Psychiatric/Behavioral: Positive for dysphoric mood and sleep disturbance. Negative for suicidal ideas. The patient is nervous/anxious.      Objective   Physical Exam   Constitutional: She is oriented to person, place, and time. No distress.   Alert and oriented. No apparent distress. No pallor, jaundice, diaphoresis, or cyanosis.     HENT:   Head: Atraumatic.   Right Ear: Tympanic membrane, external ear and ear canal normal.   Left Ear: Tympanic membrane, external ear and ear canal normal.   Nose: Nose normal.   Mouth/Throat: Oropharynx is clear and moist. Mucous membranes are not pale and not cyanotic.    Two left upper temporal incisions -clean and healing well.  No induration, tenderness, or warmth   Eyes: Pupils are equal, round, and reactive to light. EOM are normal. No scleral icterus.   Neck: No JVD present. Carotid bruit is not present. No tracheal deviation present. No thyromegaly present.   Cardiovascular: Normal rate, regular rhythm, S1 normal, S2 normal and intact distal pulses. Exam reveals no gallop, no S3 and no S4.   No murmur heard.  Pulmonary/Chest: Breath sounds normal. No stridor. No respiratory distress.   Abdominal: Soft. Normal aorta and bowel sounds are normal. She exhibits no distension, no abdominal bruit and no mass. There is no hepatosplenomegaly. There is no tenderness. No hernia.     Vascular Status -  Her right foot exhibits no edema. Her left foot exhibits no edema.  Lymphadenopathy:        Head (right side): No submandibular adenopathy present.        Head (left side): No submandibular adenopathy present.     She has no cervical adenopathy.   Neurological: She is alert and oriented to person, place, and time. She has normal strength. She displays no tremor and normal reflexes. No cranial nerve deficit. She exhibits normal muscle tone. Coordination and gait normal.   Reflex Scores:       Tricep reflexes are 2+ on the right side and 2+ on the left side.       Bicep reflexes are 1+ on the right side and 1+ on the left side.       Brachioradialis reflexes are 1+ on the right side and 1+ on the left side.       Patellar reflexes are 2+ on the right side and 2+ on the left side.       Achilles reflexes are 1+ on the right side and 1+ on the left side.  Skin: Skin is warm and dry. No rash noted. She is not diaphoretic. No cyanosis. No pallor. Nails show no clubbing.   Psychiatric: She has a normal mood and affect.     Assessment/Plan   Problems Addressed this Visit        Cardiovascular and Mediastinum    Mixed hyperlipidemia  Encouraged to continue to work on her diet and exercise plan.     Essential hypertension  As above.   Continue current medication.    Relevant Medications    furosemide (LASIX) 20 MG tablet    valsartan (DIOVAN) 80 MG tablet    Migraine without aura and without status migrainosus, not intractable    Relevant Medications    topiramate (TOPAMAX) 200 MG tablet    traMADol (ULTRAM) 50 MG tablet    butalbital-acetaminophen-caffeine (ORBIVAN) -40 MG capsule capsule       Respiratory    Extrinsic asthma without complication  Given her history of exacerbations in the Spring will resume her ICS-LABA at a lower dose  Encouraged to report if any worse or if any new symptoms or concerns.    Relevant Medications    montelukast (SINGULAIR) 10 MG tablet    albuterol sulfate  (90 Base) MCG/ACT inhaler    budesonide-formoterol (Symbicort) 80-4.5 MCG/ACT inhaler    Chronic seasonal allergic rhinitis  As above.  Continue current medication    Relevant Medications    levocetirizine (XYZAL) 5 MG tablet    montelukast (SINGULAIR) 10 MG tablet    mometasone (NASONEX) 50 MCG/ACT nasal spray       Digestive    Gastroesophageal reflux disease without esophagitis    Relevant Medications    esomeprazole (nexIUM) 40 MG capsule       Nervous and Auditory    Subdural hematoma (CMS/HCC)  S/P evacuation 2/12/20  Appears to be doing quite well  Follow up with neurosurgery    Relevant Medications    butalbital-acetaminophen-caffeine (ORBIVAN) -40 MG capsule capsule       Musculoskeletal and Integument    Generalized osteoarthritis  Reminded regarding symptomatic treatment.   Continue current medication    Relevant Medications    traMADol (ULTRAM) 50 MG tablet    Osteopenia of multiple sites       Other    Recurrent major depressive disorder, in partial remission (CMS/HCC)  Significant situational component.   Supportive therapy.     Healthcare maintenance  Patient has received hepatitis A #1 and is aware to obtain a second dose six months afterward.  Updated labs will be arranged at her return.   Will also discuss an updated mammogram and DEXA scan at that time

## 2020-03-23 ENCOUNTER — TRANSITIONAL CARE MANAGEMENT TELEPHONE ENCOUNTER (OUTPATIENT)
Dept: CALL CENTER | Facility: HOSPITAL | Age: 75
End: 2020-03-23

## 2020-03-23 ENCOUNTER — TELEPHONE (OUTPATIENT)
Dept: FAMILY MEDICINE CLINIC | Facility: CLINIC | Age: 75
End: 2020-03-23

## 2020-03-23 NOTE — OUTREACH NOTE
Call Center TCM Note      Responses   [facility] patient discharged from?  Non-BH   Does the patient have one of the following disease processes/diagnoses(primary or secondary)?  Non- Discharge   TCM attempt successful?  No   Revoked Reason  Other [Patient saw MD on 3/19 for appt after D/C]          Sushma Patiño RN    3/23/2020, 08:34

## 2020-03-23 NOTE — TELEPHONE ENCOUNTER
is aware of this information.     ----- Message from Luis Sim MD sent at 3/23/2020  1:48 PM EDT -----  yup  I think she has a subcuticular suture however (no staples)  ----- Message -----  From: Christel Hollis MA  Sent: 3/23/2020   1:29 PM EDT  To: Luis Sim MD    Patient's stables needs to be removed from her surgery in [place]. She is not comfortable with going back to  due to outbreak of the coronavirus.  called and wanted to know if you would be okay with taking her stables out for them?

## 2020-03-25 ENCOUNTER — TELEPHONE (OUTPATIENT)
Dept: FAMILY MEDICINE CLINIC | Facility: CLINIC | Age: 75
End: 2020-03-25

## 2020-03-25 RX ORDER — PROMETHAZINE HYDROCHLORIDE 25 MG/1
25 TABLET ORAL EVERY 6 HOURS PRN
Qty: 60 TABLET | Refills: 0 | Status: SHIPPED | OUTPATIENT
Start: 2020-03-25 | End: 2020-07-23 | Stop reason: SDUPTHER

## 2020-03-25 NOTE — TELEPHONE ENCOUNTER
----- Message from Luis Sim MD sent at 3/25/2020 11:51 AM EDT -----  Emailed    ----- Message -----  From: Suzanne Santamaria MA  Sent: 3/25/2020  10:28 AM EDT  To: Luis Sim MD    Could you send her some phenergan to [facility]?

## 2020-03-27 ENCOUNTER — OFFICE VISIT (OUTPATIENT)
Dept: FAMILY MEDICINE CLINIC | Facility: CLINIC | Age: 75
End: 2020-03-27

## 2020-03-27 VITALS
RESPIRATION RATE: 14 BRPM | BODY MASS INDEX: 28.71 KG/M2 | DIASTOLIC BLOOD PRESSURE: 70 MMHG | OXYGEN SATURATION: 96 % | TEMPERATURE: 99 F | SYSTOLIC BLOOD PRESSURE: 132 MMHG | WEIGHT: 156 LBS | HEIGHT: 62 IN | HEART RATE: 73 BPM

## 2020-03-27 DIAGNOSIS — J45.40 MODERATE PERSISTENT EXTRINSIC ASTHMA WITHOUT COMPLICATION: ICD-10-CM

## 2020-03-27 DIAGNOSIS — E78.2 MIXED HYPERLIPIDEMIA: ICD-10-CM

## 2020-03-27 DIAGNOSIS — S06.5XAA SUBDURAL HEMATOMA (HCC): Primary | ICD-10-CM

## 2020-03-27 DIAGNOSIS — I10 ESSENTIAL HYPERTENSION: ICD-10-CM

## 2020-03-27 DIAGNOSIS — G43.009 MIGRAINE WITHOUT AURA AND WITHOUT STATUS MIGRAINOSUS, NOT INTRACTABLE: ICD-10-CM

## 2020-03-27 PROCEDURE — 99213 OFFICE O/P EST LOW 20 MIN: CPT | Performed by: GENERAL PRACTICE

## 2020-03-27 NOTE — PROGRESS NOTES
Subjective   Farzana Hawk is a 74 y.o. female.     History of Present Illness     Left Subdural Hematoma  Discharged from [facility] on 3/14/2020 after undergoing evacuation of a left subdural hematoma.  Her course in hospital was uncomplicated and she was ultimately discharged home on her preadmission medications with the addition of butalbital-acetaminophen-caffeine 1 every 4 hours as needed and the discontinuation of ASA.  She has continued to have daily headaches described as a generalized throb much like those she has had in the past.  She has had little if any incisional pain and denies any problems with her speech, vision, strength, or sensation.  There is no history of any nausea, vomiting, or diarrhea.  While she admits to shortness of breath with exertion this has been no worse than at baseline and she denies any orthopnea, PND, chest pain, cough, calf pain, or some of the ankles.  There is no history of any fever, chills, or night sweats.  She adamantly denies any head injury in the last year. She was to undergo a reassessment with Dr. Martinez within the next week but given the COVID 19 pandemic has elected to defer this.  She would like her suture removed here    The following portions of the patient's history were reviewed and updated as appropriate: allergies, current medications, past medical history and problem list.    Review of Systems   Constitutional: Positive for fatigue. Negative for appetite change, chills, fever and unexpected weight change.   HENT: Positive for rhinorrhea. Negative for congestion, ear pain, sneezing, sore throat and voice change.    Eyes: Negative for visual disturbance.   Respiratory: Positive for cough. Negative for shortness of breath and wheezing.    Cardiovascular: Negative for chest pain, palpitations and leg swelling.   Gastrointestinal: Negative for abdominal pain, blood in stool, constipation, diarrhea, nausea and vomiting.   Genitourinary: Negative for difficulty  urinating, dysuria, frequency, hematuria and urgency.   Musculoskeletal: Positive for arthralgias and back pain. Negative for joint swelling, myalgias and neck pain.   Skin: Positive for wound (left scalp). Negative for rash.   Neurological: Positive for headaches. Negative for weakness and numbness.   Psychiatric/Behavioral: Positive for dysphoric mood and sleep disturbance. Negative for suicidal ideas. The patient is nervous/anxious.      Objective   Physical Exam   Constitutional: She is oriented to person, place, and time. No distress.   Bright and in fair spirits.  Well kept. No apparent distress. No pallor, jaundice, diaphoresis, or cyanosis.     HENT:   Head: Atraumatic.   Right Ear: Tympanic membrane, external ear and ear canal normal.   Left Ear: Tympanic membrane, external ear and ear canal normal.   Nose: Nose normal.   Mouth/Throat: Oropharynx is clear and moist. Mucous membranes are not pale and not cyanotic.   Two left upper temporal incisions -clean and healing well.  Single interrupted suture at the posterior and of the posterior incision.  No induration, tenderness, or warmth   Eyes: Pupils are equal, round, and reactive to light. EOM are normal. No scleral icterus.   Neck: No JVD present. Carotid bruit is not present. No tracheal deviation present. No thyromegaly present.   Cardiovascular: Normal rate, regular rhythm, S1 normal, S2 normal and intact distal pulses. Exam reveals no gallop, no S3 and no S4.   No murmur heard.  Pulmonary/Chest: Breath sounds normal. No stridor. No respiratory distress.     Vascular Status -  Her right foot exhibits no edema. Her left foot exhibits no edema.  Lymphadenopathy:        Head (right side): No submandibular adenopathy present.        Head (left side): No submandibular adenopathy present.     She has no cervical adenopathy.   Neurological: She is alert and oriented to person, place, and time. She displays no tremor. No cranial nerve deficit. She exhibits  normal muscle tone. Coordination and gait normal.   Skin: Skin is warm and dry. No rash noted. She is not diaphoretic. No cyanosis. No pallor. Nails show no clubbing.   Psychiatric: She has a normal mood and affect.     Assessment/Plan   Problems Addressed this Visit        Cardiovascular and Mediastinum    Mixed hyperlipidemia  Encouraged to continue to work on her diet and exercise plan.    Essential hypertension  As above.  Continue current medication    Migraine without aura and without status migrainosus, not intractable       Respiratory    Extrinsic asthma without complication  Moderate persistent asthma. The patient is not currently having an exacerbation. In general, the patient's disease is well controlled.  Discussed monitoring symptoms and use of quick-relief medications and contacting us early in the course of exacerbations.       Nervous and Auditory    Subdural hematoma (CMS/HCC)   Continues to do well  Single interrupted suture removed from the posterior scalp incision  We will follow-up with Dr. Martinez later this spring and will report if any new symptoms or concerns in the meantime

## 2020-04-07 ENCOUNTER — TELEPHONE (OUTPATIENT)
Dept: FAMILY MEDICINE CLINIC | Facility: CLINIC | Age: 75
End: 2020-04-07

## 2020-04-07 NOTE — TELEPHONE ENCOUNTER
"Called and canceled all refills on the patient's medicine. Accounts should be closed as well.     ----- Message from Luis Sim MD sent at 4/6/2020 10:13 AM EDT -----  Please cancel all of her refills with [facility] as well  She would also like us to ask [facility] to \"close\" her and Miley's account with them     "

## 2020-04-24 DIAGNOSIS — M15.9 GENERALIZED OSTEOARTHRITIS: ICD-10-CM

## 2020-04-24 RX ORDER — ACETAMINOPHEN AND CODEINE PHOSPHATE 300; 30 MG/1; MG/1
TABLET ORAL
Qty: 60 TABLET | Refills: 0 | Status: SHIPPED | OUTPATIENT
Start: 2020-04-24 | End: 2020-07-23 | Stop reason: SDUPTHER

## 2020-05-19 ENCOUNTER — OFFICE VISIT (OUTPATIENT)
Dept: FAMILY MEDICINE CLINIC | Facility: CLINIC | Age: 75
End: 2020-05-19

## 2020-05-19 DIAGNOSIS — J45.40 MODERATE PERSISTENT EXTRINSIC ASTHMA WITHOUT COMPLICATION: ICD-10-CM

## 2020-05-19 DIAGNOSIS — M85.89 OSTEOPENIA OF MULTIPLE SITES: ICD-10-CM

## 2020-05-19 DIAGNOSIS — K21.9 GASTROESOPHAGEAL REFLUX DISEASE WITHOUT ESOPHAGITIS: ICD-10-CM

## 2020-05-19 DIAGNOSIS — S06.5XAA SUBDURAL HEMATOMA (HCC): ICD-10-CM

## 2020-05-19 DIAGNOSIS — F33.41 RECURRENT MAJOR DEPRESSIVE DISORDER, IN PARTIAL REMISSION (HCC): ICD-10-CM

## 2020-05-19 DIAGNOSIS — J30.2 CHRONIC SEASONAL ALLERGIC RHINITIS: ICD-10-CM

## 2020-05-19 DIAGNOSIS — Z00.00 HEALTHCARE MAINTENANCE: ICD-10-CM

## 2020-05-19 DIAGNOSIS — I10 ESSENTIAL HYPERTENSION: ICD-10-CM

## 2020-05-19 DIAGNOSIS — M15.9 GENERALIZED OSTEOARTHRITIS: ICD-10-CM

## 2020-05-19 DIAGNOSIS — E78.2 MIXED HYPERLIPIDEMIA: Primary | ICD-10-CM

## 2020-05-19 DIAGNOSIS — G43.009 MIGRAINE WITHOUT AURA AND WITHOUT STATUS MIGRAINOSUS, NOT INTRACTABLE: ICD-10-CM

## 2020-05-19 DIAGNOSIS — E55.9 VITAMIN D DEFICIENCY: ICD-10-CM

## 2020-05-19 PROCEDURE — G0439 PPPS, SUBSEQ VISIT: HCPCS | Performed by: GENERAL PRACTICE

## 2020-05-19 RX ORDER — FUROSEMIDE 20 MG/1
20 TABLET ORAL EVERY MORNING
Qty: 90 TABLET | Refills: 3 | Status: SHIPPED | OUTPATIENT
Start: 2020-05-19 | End: 2021-01-25 | Stop reason: SDUPTHER

## 2020-05-19 RX ORDER — VALSARTAN 80 MG/1
80 TABLET ORAL NIGHTLY
Qty: 90 TABLET | Refills: 3 | Status: SHIPPED | OUTPATIENT
Start: 2020-05-19 | End: 2021-01-25 | Stop reason: SDUPTHER

## 2020-05-19 RX ORDER — LEVOCETIRIZINE DIHYDROCHLORIDE 5 MG/1
5 TABLET, FILM COATED ORAL DAILY
Qty: 90 TABLET | Refills: 3 | Status: SHIPPED | OUTPATIENT
Start: 2020-05-19 | End: 2021-01-25 | Stop reason: SDUPTHER

## 2020-05-19 RX ORDER — MONTELUKAST SODIUM 10 MG/1
10 TABLET ORAL DAILY
Qty: 90 TABLET | Refills: 3 | Status: SHIPPED | OUTPATIENT
Start: 2020-05-19 | End: 2021-01-25 | Stop reason: SDUPTHER

## 2020-05-19 NOTE — PROGRESS NOTES
The ABCs of the Annual Wellness Visit  Subsequent Medicare Wellness Visit    Subjective   History of Present Illness:  Farzana Hawk is a 74 y.o. female who presents for a Subsequent Medicare Wellness Visit.    Left Subdural Hematoma  Discharged from [facility] on 3/14/2020 after undergoing evacuation of a left subdural hematoma.  Her course in hospital was uncomplicated and she was ultimately discharged home on her preadmission medications with the addition of butalbital-acetaminophen-caffeine 1 every 4 hours as needed and the discontinuation of ASA.  She has continued to have daily headaches described as a generalized throb much like those she has had in the past.  She denies any problems with her speech, vision, strength, or sensation.  There is no history of any nausea, vomiting, or diarrhea.  While she admits to shortness of breath with exertion this has been no worse than at baseline and she denies any orthopnea, PND, chest pain, cough, calf pain, or some of the ankles.  There is no history of any fever, chills, or night sweats.  She adamantly denies any head injury in the last year.    Asthma  Patient's symptoms include shortness of breath on exertion.  There is no history of any recent cough or wheezing and she denies any chest pain or hemoptysis. The patient has been suffering from these symptoms for a number of years and is usually worse in the spring. Symptoms have been well controlled since last here and she has not used albuterol for several months.  She remains on montelukast but is no longer on an ICS-LABA.  Suspected precipitants include upper respiratory infection and allergens.    Hypertension  Home blood pressure readings: are consistently at goal. Associated signs and symptoms: chest pain and dyspnea. Patient denies: palpitations, orthopnea, paroxysmal nocturnal dyspnea and peripheral edema. Current antihypertensive medications includes valsartan and furosemide. Medication compliance: taking as  prescribed.      Grief Reaction  She continues to struggle with her 's death but feels that she is coping.  She denies any loss of interest in activities or suicidal ideation.  Her grandson lives with her and is very supportive    HEALTH RISK ASSESSMENT    Recent Hospitalizations:  Recently treated at the following:  Other: [facility]    Current Medical Providers:  Patient Care Team:  Luis Sim MD as PCP - General (Family Medicine)    Smoking Status:  Social History     Tobacco Use   Smoking Status Never Smoker   Smokeless Tobacco Never Used       Alcohol Consumption:  Social History     Substance and Sexual Activity   Alcohol Use No       Depression Screen:   PHQ-2/PHQ-9 Depression Screening 5/19/2020   Little interest or pleasure in doing things 0   Feeling down, depressed, or hopeless 0   Total Score 0       Fall Risk Screen:  STEADI Fall Risk Assessment was completed, and patient is at LOW risk for falls.Assessment completed on:3/19/2020    Health Habits and Functional and Cognitive Screening:  Functional & Cognitive Status 5/19/2020   Do you have difficulty preparing food and eating? No   Do you have difficulty bathing yourself, getting dressed or grooming yourself? No   Do you have difficulty using the toilet? No   Do you have difficulty moving around from place to place? No   Do you have trouble with steps or getting out of a bed or a chair? No   Current Diet Well Balanced Diet   Dental Exam Up to date   Eye Exam Up to date   Exercise (times per week) 6 times per week   Current Exercise Activities Include Walking   Do you need help using the phone?  No   Are you deaf or do you have serious difficulty hearing?  No   Do you need help with transportation? No   Do you need help shopping? No   Do you need help preparing meals?  No   Do you need help with housework?  No   Do you need help with laundry? No   Do you need help taking your medications? No   Do you need help managing money? No   Do you  ever drive or ride in a car without wearing a seat belt? No   Have you felt unusual stress, anger or loneliness in the last month? Yes   Who do you live with? Alone   If you need help, do you have trouble finding someone available to you? No   Have you been bothered in the last four weeks by sexual problems? No   Do you have difficulty concentrating, remembering or making decisions? No         Does the patient have evidence of cognitive impairment? No    Asprin use counseling:Contraindicated from taking ASA    Age-appropriate Screening Schedule:  Refer to the list below for future screening recommendations based on patient's age, sex and/or medical conditions. Orders for these recommended tests are listed in the plan section. The patient has been provided with a written plan.    Health Maintenance   Topic Date Due   • ZOSTER VACCINE (2 of 3) 09/26/2016   • DXA SCAN  02/22/2019   • MAMMOGRAM  08/03/2019   • LIPID PANEL  01/28/2020   • INFLUENZA VACCINE  08/01/2020   • TDAP/TD VACCINES (2 - Td) 02/28/2027          The following portions of the patient's history were reviewed and updated as appropriate: allergies, current medications, past family history, past medical history, past social history, past surgical history and problem list.    Outpatient Medications Prior to Visit   Medication Sig Dispense Refill   • acetaminophen-codeine (TYLENOL #3) 300-30 MG per tablet TAKE 1 TABLET BY MOUTH TWICE DAILY AS NEEDED FOR  MODERATE  PAIN 60 tablet 0   • cholecalciferol (VITAMIN D3) 1000 UNITS tablet Take 1,000 Units by mouth daily.     • promethazine (PHENERGAN) 25 MG tablet Take 1 tablet by mouth Every 6 (Six) Hours As Needed for Nausea or Vomiting. 60 tablet 0   • traMADol (ULTRAM) 50 MG tablet Take 1 tablet by mouth 4 (Four) Times a Day. 120 tablet 2   • Zoster Vac Recomb Adjuvanted (SHINGRIX) 50 MCG reconstituted suspension Inject 50 mcg into the shoulder, thigh, or buttocks See Admin Instructions. Repeat in 2-6 months 1  each 1   • albuterol sulfate  (90 Base) MCG/ACT inhaler Inhale 2 puffs Every 4 (Four) Hours As Needed for Wheezing. 3 inhaler 3   • budesonide-formoterol (Symbicort) 80-4.5 MCG/ACT inhaler Inhale 2 puffs 2 (Two) Times a Day. 3 inhaler 3   • butalbital-acetaminophen-caffeine (ORBIVAN) -40 MG capsule capsule Take 1 capsule by mouth Every 6 (Six) Hours As Needed (headache). 30 capsule 0   • esomeprazole (nexIUM) 40 MG capsule Take 1 capsule by mouth Daily. 90 capsule 3   • furosemide (LASIX) 20 MG tablet Take 1 tablet by mouth Every Morning. 90 tablet 3   • levocetirizine (XYZAL) 5 MG tablet Take 1 tablet by mouth Daily. 90 tablet 3   • mometasone (NASONEX) 50 MCG/ACT nasal spray 2 sprays into the nostril(s) as directed by provider Daily. 3 each 3   • montelukast (SINGULAIR) 10 MG tablet Take 1 tablet by mouth Daily. 90 tablet 3   • topiramate (TOPAMAX) 200 MG tablet Take 1 tablet by mouth 2 (Two) Times a Day. 180 tablet 3   • valsartan (DIOVAN) 80 MG tablet Take 1 tablet by mouth Every Night. 90 tablet 3     No facility-administered medications prior to visit.        Patient Active Problem List   Diagnosis   • Recurrent major depressive disorder, in partial remission (CMS/HCC)   • Generalized osteoarthritis   • Mixed hyperlipidemia   • Essential hypertension   • Extrinsic asthma without complication   • Chronic seasonal allergic rhinitis   • Gastroesophageal reflux disease without esophagitis   • Osteopenia of multiple sites   • Vitamin D deficiency   • Migraine without aura and without status migrainosus, not intractable   • Healthcare maintenance   • Encounter for immunization   • Subdural hematoma (CMS/HCC)       Advanced Care Planning:  ACP discussion was held with the patient during this visit. Patient does not have an advance directive, information provided.    Review of Systems   Constitutional: Positive for fatigue. Negative for appetite change, chills, fever and unexpected weight change.   HENT:  "Positive for rhinorrhea. Negative for congestion, ear pain, sneezing, sore throat and voice change.    Eyes: Negative for visual disturbance.   Respiratory: Positive for cough. Negative for shortness of breath and wheezing.    Cardiovascular: Negative for chest pain, palpitations and leg swelling.   Gastrointestinal: Negative for abdominal pain, blood in stool, constipation, diarrhea, nausea and vomiting.   Genitourinary: Negative for difficulty urinating, dysuria, frequency, hematuria and urgency.   Musculoskeletal: Positive for arthralgias and back pain. Negative for joint swelling, myalgias and neck pain.   Skin: Positive for wound (left scalp). Negative for rash.   Neurological: Positive for headaches. Negative for weakness and numbness.   Psychiatric/Behavioral: Positive for dysphoric mood and sleep disturbance. Negative for suicidal ideas. The patient is nervous/anxious.        Compared to one year ago, the patient feels her physical health is the same.  Compared to one year ago, the patient feels her mental health is worse.    Reviewed chart for potential of high risk medication in the elderly: yes  Reviewed chart for potential of harmful drug interactions in the elderly:yes    Objective      Vitals:    05/19/20 1406   Pulse: 74   Temp: 97.3 °F (36.3 °C)   TempSrc: Temporal   SpO2: 94%   Weight: 66.7 kg (147 lb)   Height: 157.5 cm (62\")       Body mass index is 26.89 kg/m².  Discussed the patient's BMI with her. The BMI is in the acceptable range.    Physical Exam   Constitutional: She is oriented to person, place, and time. No distress.   Bright and in fair spirits. No apparent distress. No pallor, jaundice, diaphoresis, or cyanosis.     HENT:   Two left upper temporal scars.  No induration, tenderness, or warmth   Eyes: Pupils are equal, round, and reactive to light. EOM are normal. No scleral icterus.   Neck: No JVD present. Carotid bruit is not present. No tracheal deviation present. No thyromegaly " present.   Cardiovascular: Normal rate, regular rhythm, S1 normal, S2 normal and intact distal pulses. Exam reveals no gallop, no S3 and no S4.   No murmur heard.  Pulmonary/Chest: Breath sounds normal. No stridor. No respiratory distress.   Abdominal: Soft. Normal appearance and bowel sounds are normal. There is no hepatosplenomegaly. There is no tenderness.     Vascular Status -  Her right foot exhibits no edema. Her left foot exhibits no edema.  Lymphadenopathy:        Head (right side): No submandibular adenopathy present.        Head (left side): No submandibular adenopathy present.     She has no cervical adenopathy.   Neurological: She is alert and oriented to person, place, and time. She displays no tremor. No cranial nerve deficit. She exhibits normal muscle tone. Coordination and gait normal.   Skin: Skin is warm and dry. No rash noted. She is not diaphoretic. No cyanosis. No pallor. Nails show no clubbing.   Psychiatric: She has a normal mood and affect.     Assessment/Plan   Medicare Risks and Personalized Health Plan  CMS Preventative Services Quick Reference  Advance Directive Discussion  Breast Cancer/Mammogram Screening  Depression/Dysphoria  Diabetic Lab Screening   Fall Risk  Immunizations Discussed/Encouraged (specific immunizations; Hepatitis A Vaccine/Series and Shingrix )  Inactivity/Sedentary    The above risks/problems have been discussed with the patient.  Pertinent information has been shared with the patient in the After Visit Summary.  Follow up plans and orders are seen below in the Assessment/Plan Section.    Diagnoses and all orders for this visit:    1. Mixed hyperlipidemia (Primary)   Encouraged to continue to work on her diet and exercise plan.  Updated labs will be drawn at her return.    2. Migraine without aura and without status migrainosus, not intractable  -     topiramate (TOPAMAX) 200 MG tablet; Take 1 tablet by mouth 2 (Two) Times a Day.  Dispense: 180 tablet; Refill:  3    3. Essential hypertension  As above.   Continue current medication.  -     furosemide (LASIX) 20 MG tablet; Take 1 tablet by mouth Every Morning.  Dispense: 90 tablet; Refill: 3  -     valsartan (DIOVAN) 80 MG tablet; Take 1 tablet by mouth Every Night.  Dispense: 90 tablet; Refill: 3    4. Moderate persistent extrinsic asthma without complication  Mild persistent asthma. The patient is not currently having an exacerbation. In general, the patient's disease is well controlled.  Discussed monitoring symptoms and use of quick-relief medications and contacting us early in the course of exacerbations.  -     montelukast (SINGULAIR) 10 MG tablet; Take 1 tablet by mouth Daily.  Dispense: 90 tablet; Refill: 3    5. Chronic seasonal allergic rhinitis  As above.  -     levocetirizine (XYZAL) 5 MG tablet; Take 1 tablet by mouth Daily.  Dispense: 90 tablet; Refill: 3  -     montelukast (SINGULAIR) 10 MG tablet; Take 1 tablet by mouth Daily.  Dispense: 90 tablet; Refill: 3    6. Vitamin D deficiency  Continue supplementation with monitoring.    7. Gastroesophageal reflux disease without esophagitis    8. Subdural hematoma (CMS/HCC)  Doing well  Encouraged to report if this should change.  Follow up with neurosurgery    9. Osteopenia of multiple sites    10. Generalized osteoarthritis    11. Recurrent major depressive disorder, in partial remission (CMS/HCC)  Significant situational component.   Supportive therapy.   Patient remains uninterested in referral to grief counseling or therapy  Encouraged to report if any worse or if any new symptoms or concerns.    12. Healthcare maintenance  We will arrange an updated mammogram and DEXA scan at her return    Follow Up:  Return in about 4 months (around 9/19/2020).     An After Visit Summary and PPPS were given to the patient.

## 2020-05-19 NOTE — PROGRESS NOTES
The ABCs of the Annual Wellness Visit  Subsequent Medicare Wellness Visit    Subjective   History of Present Illness:  Farzana Hawk is a 74 y.o. female who presents for a Subsequent Medicare Wellness Visit.    Left Subdural Hematoma  Discharged from [facility] on 3/14/2020 after undergoing evacuation of a left subdural hematoma.  Her course in hospital was uncomplicated and she was ultimately discharged home on her preadmission medications with the addition of butalbital-acetaminophen-caffeine 1 every 4 hours as needed and the discontinuation of ASA.  She has continued to have daily headaches described as a generalized throb much like those she has had in the past.  She has had little if any incisional pain and denies any problems with her speech, vision, strength, or sensation.  There is no history of any nausea, vomiting, or diarrhea.  While she admits to shortness of breath with exertion this has been no worse than at baseline and she denies any orthopnea, PND, chest pain, cough, calf pain, or some of the ankles.  There is no history of any fever, chills, or night sweats.  She adamantly denies any head injury in the last year. She was to undergo a reassessment with Dr. Martinez within the next week but given the COVID 19 pandemic has elected to defer this.      Asthma  Patient's symptoms include shortness of breath on exertion.  There is no history of any recent cough or wheezing and she denies any chest pain or hemoptysis. The patient has been suffering from these symptoms for a number of years and is usually worse in the spring. Symptoms have been well controlled since last here and she has not used albuterol since her discharge from hospital.  She remains on montelukast but is no longer on an ICS-LABA.  Suspected precipitants include upper respiratory infection and allergens.    Hypertension  Home blood pressure readings: are consistently at goal. Associated signs and symptoms: chest pain and dyspnea. Patient  denies: palpitations, orthopnea, paroxysmal nocturnal dyspnea and peripheral edema. Current antihypertensive medications includes valsartan and hydrochlorothiazide. Medication compliance: taking as prescribed.      Chronic Anxiety  She has a long history of intermittent nervousness, worrying, and difficulty sleeping.   While sad at times she denies any persistent depression nor any suicidal ideation.  She stopped duloxetine sometime ago    HEALTH RISK ASSESSMENT    Recent Hospitalizations:  Recently treated at the following:  Other: [facility]    Current Medical Providers:  Patient Care Team:  Luis Sim MD as PCP - General (Family Medicine)    Smoking Status:  Social History     Tobacco Use   Smoking Status Never Smoker   Smokeless Tobacco Never Used       Alcohol Consumption:  Social History     Substance and Sexual Activity   Alcohol Use No       Depression Screen:   No flowsheet data found.    Fall Risk Screen:  STEADI Fall Risk Assessment was completed, and patient is at LOW risk for falls.Assessment completed on:3/19/2020    Health Habits and Functional and Cognitive Screening:  No flowsheet data found.      Does the patient have evidence of cognitive impairment? No    Asprin use counseling:Contraindicated from taking ASA    Age-appropriate Screening Schedule:  Refer to the list below for future screening recommendations based on patient's age, sex and/or medical conditions. Orders for these recommended tests are listed in the plan section. The patient has been provided with a written plan.    Health Maintenance   Topic Date Due   • ZOSTER VACCINE (2 of 3) 09/26/2016   • DXA SCAN  02/22/2019   • MAMMOGRAM  08/03/2019   • LIPID PANEL  01/28/2020   • INFLUENZA VACCINE  08/01/2020   • TDAP/TD VACCINES (2 - Td) 02/28/2027          The following portions of the patient's history were reviewed and updated as appropriate: allergies, current medications, past family history, past medical history, past social  history, past surgical history and problem list.    Outpatient Medications Prior to Visit   Medication Sig Dispense Refill   • acetaminophen-codeine (TYLENOL #3) 300-30 MG per tablet TAKE 1 TABLET BY MOUTH TWICE DAILY AS NEEDED FOR  MODERATE  PAIN 60 tablet 0   • albuterol sulfate  (90 Base) MCG/ACT inhaler Inhale 2 puffs Every 4 (Four) Hours As Needed for Wheezing. 3 inhaler 3   • budesonide-formoterol (Symbicort) 80-4.5 MCG/ACT inhaler Inhale 2 puffs 2 (Two) Times a Day. 3 inhaler 3   • butalbital-acetaminophen-caffeine (ORBIVAN) -40 MG capsule capsule Take 1 capsule by mouth Every 6 (Six) Hours As Needed (headache). 30 capsule 0   • cholecalciferol (VITAMIN D3) 1000 UNITS tablet Take 1,000 Units by mouth daily.     • esomeprazole (nexIUM) 40 MG capsule Take 1 capsule by mouth Daily. 90 capsule 3   • furosemide (LASIX) 20 MG tablet Take 1 tablet by mouth Every Morning. 90 tablet 3   • levocetirizine (XYZAL) 5 MG tablet Take 1 tablet by mouth Daily. 90 tablet 3   • mometasone (NASONEX) 50 MCG/ACT nasal spray 2 sprays into the nostril(s) as directed by provider Daily. 3 each 3   • montelukast (SINGULAIR) 10 MG tablet Take 1 tablet by mouth Daily. 90 tablet 3   • promethazine (PHENERGAN) 25 MG tablet Take 1 tablet by mouth Every 6 (Six) Hours As Needed for Nausea or Vomiting. 60 tablet 0   • topiramate (TOPAMAX) 200 MG tablet Take 1 tablet by mouth 2 (Two) Times a Day. 180 tablet 3   • traMADol (ULTRAM) 50 MG tablet Take 1 tablet by mouth 4 (Four) Times a Day. 120 tablet 2   • valsartan (DIOVAN) 80 MG tablet Take 1 tablet by mouth Every Night. 90 tablet 3   • Zoster Vac Recomb Adjuvanted (SHINGRIX) 50 MCG reconstituted suspension Inject 50 mcg into the shoulder, thigh, or buttocks See Admin Instructions. Repeat in 2-6 months 1 each 1     No facility-administered medications prior to visit.        Patient Active Problem List   Diagnosis   • Recurrent major depressive disorder, in partial remission  "(CMS/[facility])   • Generalized osteoarthritis   • Mixed hyperlipidemia   • Essential hypertension   • Extrinsic asthma without complication   • Chronic seasonal allergic rhinitis   • Gastroesophageal reflux disease without esophagitis   • Osteopenia of multiple sites   • Vitamin D deficiency   • Migraine without aura and without status migrainosus, not intractable   • Healthcare maintenance   • Encounter for immunization   • Subdural hematoma (CMS/[facility])       Advanced Care Planning:  ACP discussion was declined by the patient. Patient does not have an advance directive, information provided.    Review of Systems    Compared to one year ago, the patient feels her physical health is {better worse same:03074}.  Compared to one year ago, the patient feels her mental health is {better worse same:49483}.    Reviewed chart for potential of high risk medication in the elderly: {Response;Yes/No/NA:[id number]::\"yes\"}  Reviewed chart for potential of harmful drug interactions in the elderly:{Response;Yes/No/NA:[id number]::\"yes\"}    Objective       There were no vitals filed for this visit.    There is no height or weight on file to calculate BMI.  Discussed the patient's BMI with her. The BMI {BMI plan ([facility] measure 421):18075}.    Physical Exam          Assessment/Plan   Medicare Risks and Personalized Health Plan  CMS Preventative Services Quick Reference  {Medicare Wellness Risk Factors and Personalized Health Plan:55833}    The above risks/problems have been discussed with the patient.  Pertinent information has been shared with the patient in the After Visit Summary.  Follow up plans and orders are seen below in the Assessment/Plan Section.    Diagnoses and all orders for this visit:    1. Mixed hyperlipidemia (Primary)    2. Migraine without aura and without status migrainosus, not intractable    3. Essential hypertension    4. Moderate persistent extrinsic asthma without complication    5. Chronic seasonal allergic " rhinitis    6. Vitamin D deficiency    7. Gastroesophageal reflux disease without esophagitis    8. Subdural hematoma (CMS/HCC)    9. Osteopenia of multiple sites    10. Generalized osteoarthritis    11. Recurrent major depressive disorder, in partial remission (CMS/HCC)    12. Healthcare maintenance      Follow Up:  No follow-ups on file.     An After Visit Summary and PPPS were given to the patient.

## 2020-05-20 VITALS
OXYGEN SATURATION: 94 % | BODY MASS INDEX: 27.05 KG/M2 | HEART RATE: 74 BPM | TEMPERATURE: 97.3 F | RESPIRATION RATE: 12 BRPM | SYSTOLIC BLOOD PRESSURE: 121 MMHG | HEIGHT: 62 IN | WEIGHT: 147 LBS | DIASTOLIC BLOOD PRESSURE: 63 MMHG

## 2020-06-08 RX ORDER — BENZONATATE 100 MG/1
100 CAPSULE ORAL 3 TIMES DAILY PRN
Qty: 120 CAPSULE | Refills: 0 | Status: SHIPPED | OUTPATIENT
Start: 2020-06-08 | End: 2020-09-22 | Stop reason: SDUPTHER

## 2020-07-10 ENCOUNTER — OFFICE VISIT (OUTPATIENT)
Dept: FAMILY MEDICINE CLINIC | Facility: CLINIC | Age: 75
End: 2020-07-10

## 2020-07-10 DIAGNOSIS — F33.41 RECURRENT MAJOR DEPRESSIVE DISORDER, IN PARTIAL REMISSION (HCC): ICD-10-CM

## 2020-07-10 DIAGNOSIS — M85.89 OSTEOPENIA OF MULTIPLE SITES: ICD-10-CM

## 2020-07-10 DIAGNOSIS — G43.009 MIGRAINE WITHOUT AURA AND WITHOUT STATUS MIGRAINOSUS, NOT INTRACTABLE: ICD-10-CM

## 2020-07-10 DIAGNOSIS — Z00.00 HEALTHCARE MAINTENANCE: ICD-10-CM

## 2020-07-10 DIAGNOSIS — J30.2 CHRONIC SEASONAL ALLERGIC RHINITIS: ICD-10-CM

## 2020-07-10 DIAGNOSIS — J45.40 MODERATE PERSISTENT EXTRINSIC ASTHMA WITHOUT COMPLICATION: ICD-10-CM

## 2020-07-10 DIAGNOSIS — S06.5XAA SUBDURAL HEMATOMA (HCC): ICD-10-CM

## 2020-07-10 DIAGNOSIS — E78.2 MIXED HYPERLIPIDEMIA: Primary | ICD-10-CM

## 2020-07-10 DIAGNOSIS — I10 ESSENTIAL HYPERTENSION: ICD-10-CM

## 2020-07-10 DIAGNOSIS — M54.59 MECHANICAL LOW BACK PAIN: ICD-10-CM

## 2020-07-10 DIAGNOSIS — M15.9 GENERALIZED OSTEOARTHRITIS: ICD-10-CM

## 2020-07-10 PROCEDURE — 99214 OFFICE O/P EST MOD 30 MIN: CPT | Performed by: GENERAL PRACTICE

## 2020-07-10 NOTE — PROGRESS NOTES
Subjective   Farzana Hawk is a 74 y.o. female.     History of Present Illness     Back Pain  Returns with an approximate 1 week history of left lower back pain.  There is no history of any strain or trauma nor any change in her activities.  The pain is described as a sharp ache radiating to the left lateral hip.  The pain does not radiate elsewhere and is been unassociated with any other symptoms.  There is no history of any stiffness or swelling and she denies any changes in her strength or sensation.  There is no history of any rash, fever, or chills.  The pain increases with activity and prolonged posture and improves with rest or at change of position.    Left Subdural Hematoma  Discharged from [facility] on 3/14/2020 after undergoing evacuation of a left subdural hematoma.  Her course in hospital was uncomplicated and she was ultimately discharged home on her preadmission medications with the addition of butalbital-acetaminophen-caffeine 1 every 4 hours as needed and the discontinuation of ASA.  She has continued to have intermittent headaches described as a generalized throb much like those she has had in the past.  There has been some improvement in the frequency since last here.  She continues to deny any problems with her speech, vision, strength, or sensation.  There is no history of any nausea, vomiting, or diarrhea.  While she admits to shortness of breath with exertion this has been no worse than at baseline and she denies any orthopnea, PND, chest pain, cough, calf pain, or some of the ankles.  There is no history of any fever, chills, or night sweats.  She adamantly denies any head injury in the last year.    Asthma  Patient's symptoms include shortness of breath on exertion.  There is no history of any recent cough or wheezing and she denies any chest pain or hemoptysis. The patient has been suffering from these symptoms for a number of years and is usually worse in the spring. Symptoms have been well  controlled since last here and she has not used albuterol for several months.  She remains on montelukast but is no longer on an ICS-LABA.  Suspected precipitants include upper respiratory infection and allergens.    Hypertension  Home blood pressure readings: are consistently at goal. Associated signs and symptoms: chest pain and dyspnea. Patient denies: palpitations, orthopnea, paroxysmal nocturnal dyspnea and peripheral edema. Current antihypertensive medications includes valsartan and furosemide. Medication compliance: taking as prescribed.      Grief Reaction  She continues to struggle with her 's death but feels that she is coping.  She denies any loss of interest in activities or suicidal ideation.  Her grandson lives with her and is very supportive    The following portions of the patient's history were reviewed and updated as appropriate: allergies, current medications, past medical history, past social history and problem list.    Review of Systems   Constitutional: Positive for fatigue. Negative for appetite change, chills, fever and unexpected weight change.   HENT: Positive for rhinorrhea. Negative for congestion, ear pain, sneezing, sore throat and voice change.    Eyes: Negative for visual disturbance.   Respiratory: Positive for cough. Negative for shortness of breath and wheezing.    Cardiovascular: Negative for chest pain, palpitations and leg swelling.   Gastrointestinal: Negative for abdominal pain, blood in stool, constipation, diarrhea, nausea and vomiting.   Genitourinary: Negative for dysuria and hematuria.   Musculoskeletal: Positive for arthralgias and back pain. Negative for joint swelling and myalgias.   Skin: Positive for wound (she feels the incision line has become more depressed with time). Negative for rash.   Neurological: Positive for headaches. Negative for weakness and numbness.   Psychiatric/Behavioral: Positive for dysphoric mood and sleep disturbance. Negative for  suicidal ideas. The patient is nervous/anxious.      Objective   Physical Exam   Constitutional: She is oriented to person, place, and time. No distress.   Alert and in fair spirits.  Moving somewhat slow and cautiously but no apparent distress. No pallor, jaundice, diaphoresis, or cyanosis.     HENT:   Two well-healed left upper temporal scars.  Mild craniotomy depression.  No tenderness   Eyes: Pupils are equal, round, and reactive to light. EOM are normal. No scleral icterus.   Neck: No JVD present. Carotid bruit is not present. No tracheal deviation present. No thyromegaly present.   Cardiovascular: Normal rate, regular rhythm, S1 normal, S2 normal and intact distal pulses. Exam reveals no gallop, no S3 and no S4.   No murmur heard.  Pulmonary/Chest: Breath sounds normal. No stridor. No respiratory distress.   Abdominal: Soft. There is no hepatosplenomegaly. There is no tenderness.   Musculoskeletal:        Left hip: She exhibits normal range of motion, no tenderness and no crepitus.        Lumbar back: She exhibits decreased range of motion and tenderness (left lumbar paraspinal muscle tenderness). She exhibits no bony tenderness and no deformity.   Negative straight leg raise.     Vascular Status -  Her right foot exhibits no edema. Her left foot exhibits no edema.  Lymphadenopathy:        Head (right side): No submandibular adenopathy present.        Head (left side): No submandibular adenopathy present.     She has no cervical adenopathy.   Neurological: She is alert and oriented to person, place, and time. She displays no tremor. No cranial nerve deficit. She exhibits normal muscle tone. Coordination and gait normal.   Skin: Skin is warm and dry. No rash noted. She is not diaphoretic. No cyanosis. No pallor. Nails show no clubbing.   Psychiatric: She has a normal mood and affect.     Assessment/Plan   Problems Addressed this Visit        Cardiovascular and Mediastinum    Essential hypertension  Encouraged  to continue to work on her diet and exercise plan.  Continue current medication    Migraine without aura and without status migrainosus, not intractable    Mixed hyperlipidemia   As above.   Continue current medication.  Updated labs will be drawn at her return.       Respiratory    Chronic seasonal allergic rhinitis    Extrinsic asthma without complication  Mild persistent asthma. The patient is not currently having an exacerbation. In general, the patient's disease is well controlled.  Discussed monitoring symptoms and use of quick-relief medications and contacting us early in the course of exacerbations.       Nervous and Auditory    Mechanical low back pain  Advised regarding rest, gentle exercise, ice and heat.  Encouraged to report if any worse, any new symptoms, or if not resolving over the next 3-4 weeks    Subdural hematoma (CMS/HCC)  Reassured that her craniotomy has healed well  Encouraged report if any new symptoms or concerns       Musculoskeletal and Integument    Generalized osteoarthritis    Osteopenia of multiple sites  Encouraged to continue to pursue weight bearing activities while exercising joint protection.  Recommended an updated DEXA scan at the time of her next mammogram       Other    Healthcare maintenance  Patient would like to defer mammogram until after the COVID-19 pandemic  Reminded to get a flu shot when available    Recurrent major depressive disorder, in partial remission (CMS/HCC)  With recent grief reaction  Supportive therapy  Encouraged to report if any worse or if any new symptoms or concerns.

## 2020-07-11 VITALS
DIASTOLIC BLOOD PRESSURE: 74 MMHG | HEART RATE: 77 BPM | TEMPERATURE: 98.2 F | SYSTOLIC BLOOD PRESSURE: 132 MMHG | HEIGHT: 62 IN | OXYGEN SATURATION: 96 % | BODY MASS INDEX: 27.23 KG/M2 | RESPIRATION RATE: 14 BRPM | WEIGHT: 148 LBS

## 2020-07-11 PROBLEM — M54.59 MECHANICAL LOW BACK PAIN: Status: ACTIVE | Noted: 2020-07-11

## 2020-07-23 DIAGNOSIS — M15.9 GENERALIZED OSTEOARTHRITIS: ICD-10-CM

## 2020-07-23 RX ORDER — ACETAMINOPHEN AND CODEINE PHOSPHATE 300; 30 MG/1; MG/1
1 TABLET ORAL 2 TIMES DAILY PRN
Qty: 60 TABLET | Refills: 2 | Status: SHIPPED | OUTPATIENT
Start: 2020-07-23 | End: 2021-01-25 | Stop reason: SDUPTHER

## 2020-07-23 RX ORDER — PROMETHAZINE HYDROCHLORIDE 25 MG/1
25 TABLET ORAL EVERY 6 HOURS PRN
Qty: 60 TABLET | Refills: 0 | Status: SHIPPED | OUTPATIENT
Start: 2020-07-23 | End: 2022-11-19

## 2020-09-22 ENCOUNTER — OFFICE VISIT (OUTPATIENT)
Dept: FAMILY MEDICINE CLINIC | Facility: CLINIC | Age: 75
End: 2020-09-22

## 2020-09-22 DIAGNOSIS — K21.9 GASTROESOPHAGEAL REFLUX DISEASE WITHOUT ESOPHAGITIS: ICD-10-CM

## 2020-09-22 DIAGNOSIS — J30.2 CHRONIC SEASONAL ALLERGIC RHINITIS: ICD-10-CM

## 2020-09-22 DIAGNOSIS — E55.9 VITAMIN D DEFICIENCY: ICD-10-CM

## 2020-09-22 DIAGNOSIS — Z23 ENCOUNTER FOR IMMUNIZATION: ICD-10-CM

## 2020-09-22 DIAGNOSIS — I10 ESSENTIAL HYPERTENSION: ICD-10-CM

## 2020-09-22 DIAGNOSIS — M15.9 GENERALIZED OSTEOARTHRITIS: ICD-10-CM

## 2020-09-22 DIAGNOSIS — M85.89 OSTEOPENIA OF MULTIPLE SITES: ICD-10-CM

## 2020-09-22 DIAGNOSIS — Z12.31 ENCOUNTER FOR SCREENING MAMMOGRAM FOR BREAST CANCER: ICD-10-CM

## 2020-09-22 DIAGNOSIS — G43.009 MIGRAINE WITHOUT AURA AND WITHOUT STATUS MIGRAINOSUS, NOT INTRACTABLE: ICD-10-CM

## 2020-09-22 DIAGNOSIS — Z00.00 HEALTHCARE MAINTENANCE: ICD-10-CM

## 2020-09-22 DIAGNOSIS — F33.41 RECURRENT MAJOR DEPRESSIVE DISORDER, IN PARTIAL REMISSION (HCC): ICD-10-CM

## 2020-09-22 DIAGNOSIS — R19.7 DIARRHEA, UNSPECIFIED TYPE: ICD-10-CM

## 2020-09-22 DIAGNOSIS — S06.5XAA SUBDURAL HEMATOMA (HCC): ICD-10-CM

## 2020-09-22 DIAGNOSIS — M54.59 MECHANICAL LOW BACK PAIN: ICD-10-CM

## 2020-09-22 DIAGNOSIS — E78.2 MIXED HYPERLIPIDEMIA: Primary | ICD-10-CM

## 2020-09-22 DIAGNOSIS — J45.40 MODERATE PERSISTENT EXTRINSIC ASTHMA WITHOUT COMPLICATION: ICD-10-CM

## 2020-09-22 PROCEDURE — 99214 OFFICE O/P EST MOD 30 MIN: CPT | Performed by: GENERAL PRACTICE

## 2020-09-22 PROCEDURE — G0008 ADMIN INFLUENZA VIRUS VAC: HCPCS | Performed by: GENERAL PRACTICE

## 2020-09-22 PROCEDURE — 90686 IIV4 VACC NO PRSV 0.5 ML IM: CPT | Performed by: GENERAL PRACTICE

## 2020-09-22 RX ORDER — BENZONATATE 100 MG/1
100 CAPSULE ORAL 3 TIMES DAILY PRN
Qty: 90 CAPSULE | Refills: 5 | Status: SHIPPED | OUTPATIENT
Start: 2020-09-22 | End: 2021-01-25 | Stop reason: SDUPTHER

## 2020-09-22 NOTE — PROGRESS NOTES
Subjective   Farzana Hawk is a 75 y.o. female.     History of Present Illness     Left Subdural Hematoma  Discharged from [facility] on 3/14/2020 after undergoing evacuation of a left subdural hematoma.  Her course in hospital was uncomplicated and she was ultimately discharged home on her preadmission medications with the addition of butalbital-acetaminophen-caffeine 1 every 4 hours as needed and the discontinuation of ASA.  She has continued to have intermittent headaches described as a generalized throb much like those she has had in the past.  There has been no further change in the frequency since last here.  She continues to deny any problems with her speech, vision, strength, or sensation.  There is no history of any nausea, vomiting, or diarrhea.  While she admits to shortness of breath with exertion this has been no worse than at baseline and she denies any orthopnea, PND, chest pain, cough, calf pain, or some of the ankles.  There is no history of any fever, chills, or night sweats.  She adamantly denies any head injury in the last year.    Asthma  Patient's symptoms include shortness of breath on exertion.  There is no history of any recent cough or wheezing and she denies any chest pain or hemoptysis. The patient has been suffering from these symptoms for a number of years and is usually worse in the spring. Symptoms have remained well controlled since last here and she has not used albuterol since last here.  She remains on montelukast but is no longer on an ICS-LABA.  Suspected precipitants include upper respiratory infection and allergens.    Hypertension  Home blood pressure readings: are consistently at goal. Associated signs and symptoms: chest pain and dyspnea. Patient denies: palpitations, orthopnea, paroxysmal nocturnal dyspnea and peripheral edema. Current antihypertensive medications includes valsartan and furosemide. Medication compliance: taking as prescribed.      Grief Reaction  She  continues to struggle with her 's death but feels that she is coping.  She denies any loss of interest in activities or suicidal ideation.  Her grandson lives with her and is very supportive    Diarrhea  She gives an approximate 1 to 2 year history of intermittent diarrhea.  This is occasionally explosive and is frequently associated with mild cramping.  There is no history of any nausea, vomiting, hematochezia, or melena and she denies any fever, chills, or night sweats.  She has occasional episodes at night but the vast majority are during the day and have been unpredictable    Back Pain  She denies any further back pain    The following portions of the patient's history were reviewed and updated as appropriate: allergies, current medications, past medical history, past social history and problem list.    Review of Systems   Constitutional: Positive for fatigue. Negative for appetite change, chills, fever and unexpected weight change.   HENT: Positive for rhinorrhea. Negative for congestion, ear pain, sneezing and sore throat.    Eyes: Negative for visual disturbance.   Respiratory: Positive for cough. Negative for shortness of breath and wheezing.    Cardiovascular: Negative for chest pain, palpitations and leg swelling.   Gastrointestinal: Positive for diarrhea. Negative for abdominal pain, blood in stool, constipation, nausea and vomiting.   Genitourinary: Negative for dysuria, frequency, hematuria and urgency.   Musculoskeletal: Positive for arthralgias and back pain. Negative for joint swelling and myalgias.   Skin: Negative for rash.   Neurological: Positive for headaches. Negative for weakness and numbness.   Psychiatric/Behavioral: Positive for dysphoric mood and sleep disturbance. Negative for suicidal ideas. The patient is nervous/anxious.      Objective   Physical Exam  Constitutional:       General: She is not in acute distress.     Appearance: Normal appearance. She is well-developed. She is  not diaphoretic.   HENT:      Head: Atraumatic.      Right Ear: Tympanic membrane, ear canal and external ear normal.      Left Ear: Tympanic membrane, ear canal and external ear normal.   Eyes:      Conjunctiva/sclera: Conjunctivae normal.   Neck:      Thyroid: No thyroid mass or thyromegaly.      Vascular: No carotid bruit or JVD.      Trachea: Trachea normal. No tracheal deviation.   Cardiovascular:      Rate and Rhythm: Normal rate and regular rhythm.      Heart sounds: Normal heart sounds, S1 normal and S2 normal. No murmur. No gallop.    Pulmonary:      Effort: Pulmonary effort is normal.      Breath sounds: Normal breath sounds.   Abdominal:      General: Bowel sounds are normal. There is no distension or abdominal bruit.      Palpations: Abdomen is soft. There is no hepatomegaly, splenomegaly or mass.      Tenderness: There is no abdominal tenderness.      Hernia: No hernia is present.   Musculoskeletal:      Right lower leg: No edema.      Left lower leg: No edema.   Lymphadenopathy:      Head:      Right side of head: No submental, submandibular, tonsillar, preauricular, posterior auricular or occipital adenopathy.      Left side of head: No submental, submandibular, tonsillar, preauricular, posterior auricular or occipital adenopathy.      Cervical: No cervical adenopathy.      Upper Body:      Right upper body: No supraclavicular adenopathy.      Left upper body: No supraclavicular adenopathy.   Skin:     General: Skin is warm.      Coloration: Skin is not cyanotic, jaundiced or pale.      Findings: No rash.      Nails: There is no clubbing.     Neurological:      Mental Status: She is alert and oriented to person, place, and time.      Cranial Nerves: No cranial nerve deficit.      Motor: No tremor.      Coordination: Coordination normal.      Gait: Gait normal.   Psychiatric:         Attention and Perception: Attention normal.         Mood and Affect: Mood is depressed (when discussing her husbands  death -also smiled at times when appropriate). Affect is not inappropriate.         Speech: Speech normal.         Behavior: Behavior normal.         Thought Content: Thought content normal. Thought content does not include suicidal ideation.       Assessment/Plan   Problems Addressed this Visit        Cardiovascular and Mediastinum    Essential hypertension   Hypertension: at goal. Evidence of target organ damage: none.  Encouraged to continue to work on diet and exercise plan.   Continue current medication    Relevant Orders    CBC & Differential    Comprehensive Metabolic Panel    Migraine without aura and without status migrainosus, not intractable  Stable  Encouraged to report if this should change.    Mixed hyperlipidemia   As above.  Scheduled for updated labs    Relevant Orders    Lipid Panel    TSH       Respiratory    Chronic seasonal allergic rhinitis    Extrinsic asthma without complication  Intermittent asthma. The patient is not currently having an exacerbation. In general, the patient's disease is well controlled.  Encouraged to report if this should change.    Relevant Medications    benzonatate (TESSALON) 100 MG capsule       Digestive    Diarrhea  Patient absolutely uninterested in a GI assessment  Recommended avoidance of lactose-containing foods for the next several weeks and report if no better    Gastroesophageal reflux disease without esophagitis    Vitamin D deficiency  Continue supplementation with monitoring.    Relevant Orders    Vitamin D 25 Hydroxy       Nervous and Auditory    Mechanical low back pain    Subdural hematoma (CMS/HCC)       Musculoskeletal and Integument    Generalized osteoarthritis    Osteopenia of multiple sites  Encouraged to continue to pursue weight bearing activities while exercising joint protection.  Will arrange an updated DEXA scan    Relevant Orders    DEXA Bone Density Axial       Other    Healthcare maintenance  Recommended a flu shot  Reminded to follow-up  with hepatitis A #2 and Shingrix  Will also arrange an updated mammogram   Relevant Orders   Fluarix/Fluzone/Afluria Quad>6 Months (Completed)   Vitamin B12   DEXA Bone Density Axial   Mammo Screening Digital Tomosynthesis Bilateral With CAD   Recurrent major depressive disorder, in partial remission (CMS/HCC)  Significant situational component.   Supportive therapy.   Encouraged to report if any worse or if any new symptoms or concerns.

## 2020-09-23 VITALS
HEART RATE: 79 BPM | SYSTOLIC BLOOD PRESSURE: 122 MMHG | TEMPERATURE: 98.7 F | HEIGHT: 62 IN | RESPIRATION RATE: 14 BRPM | OXYGEN SATURATION: 96 % | WEIGHT: 152 LBS | BODY MASS INDEX: 27.97 KG/M2 | DIASTOLIC BLOOD PRESSURE: 70 MMHG

## 2020-09-23 PROBLEM — R19.7 DIARRHEA: Status: ACTIVE | Noted: 2020-09-23

## 2020-09-25 ENCOUNTER — LAB (OUTPATIENT)
Dept: FAMILY MEDICINE CLINIC | Facility: CLINIC | Age: 75
End: 2020-09-25

## 2020-09-25 ENCOUNTER — TELEPHONE (OUTPATIENT)
Dept: FAMILY MEDICINE CLINIC | Facility: CLINIC | Age: 75
End: 2020-09-25

## 2020-09-25 DIAGNOSIS — E78.2 MIXED HYPERLIPIDEMIA: ICD-10-CM

## 2020-09-25 DIAGNOSIS — I10 ESSENTIAL HYPERTENSION: ICD-10-CM

## 2020-09-25 DIAGNOSIS — E55.9 VITAMIN D DEFICIENCY: ICD-10-CM

## 2020-09-25 DIAGNOSIS — Z00.00 HEALTHCARE MAINTENANCE: ICD-10-CM

## 2020-09-25 LAB
25(OH)D3 SERPL-MCNC: 22.9 NG/ML (ref 30–100)
ALBUMIN SERPL-MCNC: 4 G/DL (ref 3.5–5.2)
ALBUMIN/GLOB SERPL: 1.5 G/DL
ALP SERPL-CCNC: 87 U/L (ref 39–117)
ALT SERPL W P-5'-P-CCNC: <5 U/L (ref 1–33)
ANION GAP SERPL CALCULATED.3IONS-SCNC: 8.9 MMOL/L (ref 5–15)
AST SERPL-CCNC: 10 U/L (ref 1–32)
BASOPHILS # BLD AUTO: 0.09 10*3/MM3 (ref 0–0.2)
BASOPHILS NFR BLD AUTO: 1.4 % (ref 0–1.5)
BILIRUB SERPL-MCNC: 0.7 MG/DL (ref 0–1.2)
BUN SERPL-MCNC: 11 MG/DL (ref 8–23)
BUN/CREAT SERPL: 10.9 (ref 7–25)
CALCIUM SPEC-SCNC: 9.4 MG/DL (ref 8.6–10.5)
CHLORIDE SERPL-SCNC: 109 MMOL/L (ref 98–107)
CHOLEST SERPL-MCNC: 195 MG/DL (ref 0–200)
CO2 SERPL-SCNC: 26.1 MMOL/L (ref 22–29)
CREAT SERPL-MCNC: 1.01 MG/DL (ref 0.57–1)
DEPRECATED RDW RBC AUTO: 42.8 FL (ref 37–54)
EOSINOPHIL # BLD AUTO: 0.27 10*3/MM3 (ref 0–0.4)
EOSINOPHIL NFR BLD AUTO: 4.1 % (ref 0.3–6.2)
ERYTHROCYTE [DISTWIDTH] IN BLOOD BY AUTOMATED COUNT: 13.1 % (ref 12.3–15.4)
GFR SERPL CREATININE-BSD FRML MDRD: 53 ML/MIN/1.73
GLOBULIN UR ELPH-MCNC: 2.6 GM/DL
GLUCOSE SERPL-MCNC: 86 MG/DL (ref 65–99)
HCT VFR BLD AUTO: 39.3 % (ref 34–46.6)
HDLC SERPL-MCNC: 53 MG/DL (ref 40–60)
HGB BLD-MCNC: 13.3 G/DL (ref 12–15.9)
IMM GRANULOCYTES # BLD AUTO: 0.02 10*3/MM3 (ref 0–0.05)
IMM GRANULOCYTES NFR BLD AUTO: 0.3 % (ref 0–0.5)
LDLC SERPL CALC-MCNC: 125 MG/DL (ref 0–100)
LDLC/HDLC SERPL: 2.36 {RATIO}
LYMPHOCYTES # BLD AUTO: 2.23 10*3/MM3 (ref 0.7–3.1)
LYMPHOCYTES NFR BLD AUTO: 33.9 % (ref 19.6–45.3)
MCH RBC QN AUTO: 30.6 PG (ref 26.6–33)
MCHC RBC AUTO-ENTMCNC: 33.8 G/DL (ref 31.5–35.7)
MCV RBC AUTO: 90.3 FL (ref 79–97)
MONOCYTES # BLD AUTO: 0.46 10*3/MM3 (ref 0.1–0.9)
MONOCYTES NFR BLD AUTO: 7 % (ref 5–12)
NEUTROPHILS NFR BLD AUTO: 3.51 10*3/MM3 (ref 1.7–7)
NEUTROPHILS NFR BLD AUTO: 53.3 % (ref 42.7–76)
NRBC BLD AUTO-RTO: 0 /100 WBC (ref 0–0.2)
PLATELET # BLD AUTO: 231 10*3/MM3 (ref 140–450)
PMV BLD AUTO: 11.4 FL (ref 6–12)
POTASSIUM SERPL-SCNC: 3.8 MMOL/L (ref 3.5–5.2)
PROT SERPL-MCNC: 6.6 G/DL (ref 6–8.5)
RBC # BLD AUTO: 4.35 10*6/MM3 (ref 3.77–5.28)
SODIUM SERPL-SCNC: 144 MMOL/L (ref 136–145)
TRIGL SERPL-MCNC: 84 MG/DL (ref 0–150)
TSH SERPL DL<=0.05 MIU/L-ACNC: 2.52 UIU/ML (ref 0.27–4.2)
VIT B12 BLD-MCNC: 168 PG/ML (ref 211–946)
VLDLC SERPL-MCNC: 16.8 MG/DL (ref 5–40)
WBC # BLD AUTO: 6.58 10*3/MM3 (ref 3.4–10.8)

## 2020-09-25 PROCEDURE — 80061 LIPID PANEL: CPT | Performed by: GENERAL PRACTICE

## 2020-09-25 PROCEDURE — 80053 COMPREHEN METABOLIC PANEL: CPT | Performed by: GENERAL PRACTICE

## 2020-09-25 PROCEDURE — 82607 VITAMIN B-12: CPT | Performed by: GENERAL PRACTICE

## 2020-09-25 PROCEDURE — 84443 ASSAY THYROID STIM HORMONE: CPT | Performed by: GENERAL PRACTICE

## 2020-09-25 PROCEDURE — 82306 VITAMIN D 25 HYDROXY: CPT | Performed by: GENERAL PRACTICE

## 2020-09-25 PROCEDURE — 85025 COMPLETE CBC W/AUTO DIFF WBC: CPT | Performed by: GENERAL PRACTICE

## 2020-09-25 NOTE — TELEPHONE ENCOUNTER
Pt is scheduled on 10/14/20 @ 9:45. She is aware of apt information.     ----- Message from Luis Sim MD sent at 9/23/2020  2:57 PM EDT -----  Please check with Dr. Martinez's office ([facility]) if he would like to see her sometime in follow-up (S/P drainage subdural hematoma -her scheduled follow-up did not happen due to her 's death)

## 2020-09-26 RX ORDER — ERGOCALCIFEROL 1.25 MG/1
50000 CAPSULE ORAL WEEKLY
Qty: 4 CAPSULE | Refills: 5 | Status: SHIPPED | OUTPATIENT
Start: 2020-09-26 | End: 2021-09-07

## 2020-09-28 RX ORDER — CYANOCOBALAMIN 1000 UG/ML
INJECTION, SOLUTION INTRAMUSCULAR; SUBCUTANEOUS
Qty: 10 ML | Refills: 1 | Status: SHIPPED | OUTPATIENT
Start: 2020-09-28 | End: 2021-01-25 | Stop reason: SDUPTHER

## 2020-11-24 ENCOUNTER — APPOINTMENT (OUTPATIENT)
Dept: MAMMOGRAPHY | Facility: HOSPITAL | Age: 75
End: 2020-11-24

## 2020-11-24 ENCOUNTER — APPOINTMENT (OUTPATIENT)
Dept: BONE DENSITY | Facility: HOSPITAL | Age: 75
End: 2020-11-24

## 2021-01-25 ENCOUNTER — OFFICE VISIT (OUTPATIENT)
Dept: FAMILY MEDICINE CLINIC | Facility: CLINIC | Age: 76
End: 2021-01-25

## 2021-01-25 VITALS
HEART RATE: 71 BPM | WEIGHT: 149 LBS | SYSTOLIC BLOOD PRESSURE: 127 MMHG | HEIGHT: 62 IN | BODY MASS INDEX: 27.42 KG/M2 | DIASTOLIC BLOOD PRESSURE: 87 MMHG | TEMPERATURE: 98.1 F

## 2021-01-25 DIAGNOSIS — K21.9 GASTROESOPHAGEAL REFLUX DISEASE WITHOUT ESOPHAGITIS: ICD-10-CM

## 2021-01-25 DIAGNOSIS — J30.2 CHRONIC SEASONAL ALLERGIC RHINITIS: Primary | ICD-10-CM

## 2021-01-25 DIAGNOSIS — G43.009 MIGRAINE WITHOUT AURA AND WITHOUT STATUS MIGRAINOSUS, NOT INTRACTABLE: ICD-10-CM

## 2021-01-25 DIAGNOSIS — K52.9 CHRONIC DIARRHEA: ICD-10-CM

## 2021-01-25 DIAGNOSIS — M15.9 GENERALIZED OSTEOARTHRITIS: ICD-10-CM

## 2021-01-25 DIAGNOSIS — I10 ESSENTIAL HYPERTENSION: ICD-10-CM

## 2021-01-25 DIAGNOSIS — E53.8 B12 DEFICIENCY: ICD-10-CM

## 2021-01-25 DIAGNOSIS — M85.89 OSTEOPENIA OF MULTIPLE SITES: ICD-10-CM

## 2021-01-25 DIAGNOSIS — S06.5XAA SUBDURAL HEMATOMA (HCC): ICD-10-CM

## 2021-01-25 DIAGNOSIS — E55.9 VITAMIN D DEFICIENCY: ICD-10-CM

## 2021-01-25 DIAGNOSIS — Z00.00 HEALTHCARE MAINTENANCE: ICD-10-CM

## 2021-01-25 DIAGNOSIS — E78.2 MIXED HYPERLIPIDEMIA: ICD-10-CM

## 2021-01-25 DIAGNOSIS — F33.41 RECURRENT MAJOR DEPRESSIVE DISORDER, IN PARTIAL REMISSION (HCC): ICD-10-CM

## 2021-01-25 DIAGNOSIS — J45.40 MODERATE PERSISTENT EXTRINSIC ASTHMA WITHOUT COMPLICATION: ICD-10-CM

## 2021-01-25 PROCEDURE — 99442 PR PHYS/QHP TELEPHONE EVALUATION 11-20 MIN: CPT | Performed by: GENERAL PRACTICE

## 2021-01-25 RX ORDER — LANSOPRAZOLE 30 MG/1
30 CAPSULE, DELAYED RELEASE ORAL DAILY
Qty: 90 CAPSULE | Refills: 3 | Status: SHIPPED | OUTPATIENT
Start: 2021-01-25 | End: 2022-03-29 | Stop reason: SDUPTHER

## 2021-01-25 RX ORDER — TRAMADOL HYDROCHLORIDE 50 MG/1
50 TABLET ORAL 4 TIMES DAILY
Qty: 120 TABLET | Refills: 2 | Status: SHIPPED | OUTPATIENT
Start: 2021-01-25 | End: 2021-09-07

## 2021-01-25 RX ORDER — BENZONATATE 100 MG/1
100 CAPSULE ORAL 3 TIMES DAILY PRN
Qty: 90 CAPSULE | Refills: 5 | Status: SHIPPED | OUTPATIENT
Start: 2021-01-25 | End: 2022-11-19

## 2021-01-25 RX ORDER — ACETAMINOPHEN AND CODEINE PHOSPHATE 300; 30 MG/1; MG/1
1 TABLET ORAL 2 TIMES DAILY PRN
Qty: 60 TABLET | Refills: 2 | Status: SHIPPED | OUTPATIENT
Start: 2021-01-25 | End: 2022-11-19 | Stop reason: SDUPTHER

## 2021-01-25 RX ORDER — CYANOCOBALAMIN 1000 UG/ML
1000 INJECTION, SOLUTION INTRAMUSCULAR; SUBCUTANEOUS
Qty: 1 ML | Refills: 5 | Status: SHIPPED | OUTPATIENT
Start: 2021-01-25 | End: 2022-11-19 | Stop reason: SDUPTHER

## 2021-01-25 RX ORDER — VALSARTAN 80 MG/1
80 TABLET ORAL NIGHTLY
Qty: 90 TABLET | Refills: 3 | Status: SHIPPED | OUTPATIENT
Start: 2021-01-25 | End: 2022-11-19 | Stop reason: SDUPTHER

## 2021-01-25 RX ORDER — LEVOCETIRIZINE DIHYDROCHLORIDE 5 MG/1
5 TABLET, FILM COATED ORAL DAILY
Qty: 90 TABLET | Refills: 3 | Status: SHIPPED | OUTPATIENT
Start: 2021-01-25 | End: 2022-11-19 | Stop reason: SDUPTHER

## 2021-01-25 RX ORDER — FUROSEMIDE 20 MG/1
20 TABLET ORAL EVERY MORNING
Qty: 90 TABLET | Refills: 3 | Status: SHIPPED | OUTPATIENT
Start: 2021-01-25 | End: 2022-11-19 | Stop reason: SDUPTHER

## 2021-01-25 RX ORDER — MONTELUKAST SODIUM 10 MG/1
10 TABLET ORAL DAILY
Qty: 90 TABLET | Refills: 3 | Status: SHIPPED | OUTPATIENT
Start: 2021-01-25 | End: 2022-11-19 | Stop reason: SDUPTHER

## 2021-01-25 NOTE — PROGRESS NOTES
Subjective   Farzana Hawk is a 75 y.o. female.     This visit has been rescheduled as a phone visit to comply with patient safety concerns in accordance with CDC recommendations. Total time of discussion was 14 minutes.    You have chosen to receive care through a telephone visit. Do you consent to use a telephone visit for your medical care today? Yes    History of Present Illness     Left Subdural Hematoma  Discharged from [facility] on 3/14/2020 after undergoing evacuation of a left subdural hematoma.  Her course in hospital was uncomplicated.  While she continues to have intermittent headaches these are no different then she has had in the past.  She continues to deny any problems with her speech, vision, strength, or sensation.   She underwent a neurosurgical reassessment since last here at which time no further follow up was felt to be necessary    Asthma  Patient's symptoms include shortness of breath on exertion with an intermittent dry cough.  There is no history of any chest pain or hemoptysis. The patient has been suffering from these symptoms for a number of years and is usually worse in the spring. Symptoms have remained well controlled since last here and she has not used albuterol.  She remains on montelukast but is no longer on an ICS-LABA.  Suspected precipitants include upper respiratory infection and allergens.    Hypertension  Home blood pressure readings: are consistently at goal. Associated signs and symptoms: dyspnea. Patient denies: chest pain, palpitations, orthopnea, paroxysmal nocturnal dyspnea or peripheral edema. Current antihypertensive medications includes valsartan and furosemide. Medication compliance: taking as prescribed.    Lab Results   Component Value Date    GLUCOSE 86 09/25/2020    BUN 11 09/25/2020    CREATININE 1.01 (H) 09/25/2020    EGFRIFNONA 53 (L) 09/25/2020    BCR 10.9 09/25/2020    K 3.8 09/25/2020    CO2 26.1 09/25/2020    CALCIUM 9.4 09/25/2020    ALBUMIN 4.00  09/25/2020    AST 10 09/25/2020    ALT <5 09/25/2020     Grief Reaction  She continues to struggle with her 's death but feels that she is coping.  She denies any loss of interest in activities or suicidal ideation.  Her grandson lives with her and is very supportive    Diarrhea  She gives an approximate 1 to 2 year history of intermittent diarrhea.  This is occasionally explosive and is frequently associated with mild cramping.  There is no history of any nausea, vomiting, hematochezia, or melena and she denies any fever, chills, or night sweats.  She has occasional episodes at night but the vast majority are during the day and remain unpredictable    Labs  Lab Results   Component Value Date    WBC 6.58 09/25/2020    HGB 13.3 09/25/2020    HCT 39.3 09/25/2020    MCV 90.3 09/25/2020     09/25/2020     Lab Results   Component Value Date    CHOL 195 09/25/2020    TRIG 84 09/25/2020    HDL 53 09/25/2020     (H) 09/25/2020     Lab Results   Component Value Date    TSH 2.520 09/25/2020   Most recent vitamin D 22.9 with a B12 of 168. She was started on B12 injections after her most recent labs    The following portions of the patient's history were reviewed and updated as appropriate: allergies, current medications, past medical history, past social history and problem list.    Review of Systems   Constitutional: Positive for fatigue. Negative for appetite change, chills, fever and unexpected weight change.   HENT: Positive for rhinorrhea. Negative for congestion, ear pain, sneezing and sore throat.    Eyes: Negative for visual disturbance.   Respiratory: Positive for cough and shortness of breath (with exertion). Negative for wheezing.    Cardiovascular: Negative for chest pain, palpitations and leg swelling.   Gastrointestinal: Positive for diarrhea. Negative for abdominal pain, blood in stool, constipation, nausea and vomiting.   Genitourinary: Negative for dysuria, frequency, hematuria and  urgency.   Musculoskeletal: Positive for arthralgias and back pain. Negative for joint swelling and myalgias.   Skin: Negative for rash.   Neurological: Positive for headaches. Negative for weakness and numbness.   Psychiatric/Behavioral: Positive for dysphoric mood and sleep disturbance. Negative for suicidal ideas. The patient is nervous/anxious.      Objective   Physical Exam  Constitutional:       Comments: Alert and oriented.  Bright and in fair spirits.  No apparent distress, audible wheezing, or shortness of breath.     Psychiatric:         Attention and Perception: Attention normal.         Mood and Affect: Mood normal.         Speech: Speech normal.         Thought Content: Thought content normal. Thought content does not include suicidal ideation.       Assessment/Plan   Problems Addressed this Visit        Allergies and Adverse Reactions    Chronic seasonal allergic rhinitis   Reminded regarding allergen avoidance.  Continue current medication    Relevant Medications    levocetirizine (XYZAL) 5 MG tablet    montelukast (SINGULAIR) 10 MG tablet       Cardiac and Vasculature    Essential hypertension  Encouraged to continue to work on her diet and exercise plan.  Continue current medication    Relevant Medications    furosemide (LASIX) 20 MG tablet    valsartan (DIOVAN) 80 MG tablet    Mixed hyperlipidemia  As above.       Endocrine and Metabolic    B12 deficiency  Continue supplementation with monitoring.  Updated labs will be arranged at return.    Relevant Medications    cyanocobalamin 1000 MCG/ML injection    Vitamin D deficiency  As above.       Gastrointestinal Abdominal     Chronic diarrhea  Patient remains uninterested in a GI assessment    Gastroesophageal reflux disease without esophagitis    Relevant Medications    lansoprazole (PREVACID) 30 MG capsule       Health Encounters    Healthcare maintenance  Encouraged to obtain a COVID-19 immunization when available.  Patient has decided to defer an  updated mammogram and DEXA scan until after she has been immunized       Mental Health    Recurrent major depressive disorder, in partial remission (CMS/HCC)  Significant situational component.   Supportive therapy.        Musculoskeletal and Injuries    Generalized osteoarthritis  Reminded regarding symptomatic treatment.   Continue current medication    Relevant Medications    acetaminophen-codeine (TYLENOL #3) 300-30 MG per tablet    traMADol (ULTRAM) 50 MG tablet    Osteopenia of multiple sites       Neuro    Migraine without aura and without status migrainosus, not intractable    Relevant Medications    topiramate (TOPAMAX) 200 MG tablet    acetaminophen-codeine (TYLENOL #3) 300-30 MG per tablet    traMADol (ULTRAM) 50 MG tablet    Subdural hematoma (CMS/HCC)  Continues to do well  Encouraged to report if this should change.       Pulmonary and Pneumonias    Extrinsic asthma without complication  Mild persistent asthma. The patient is not currently having an exacerbation. In general, the patient's disease is well controlled.  Discussed monitoring symptoms and use of quick-relief medications and contacting us early in the course of exacerbations.    Relevant Medications    montelukast (SINGULAIR) 10 MG tablet    benzonatate (TESSALON) 100 MG capsule      Diagnoses       Codes Comments    Chronic seasonal allergic rhinitis    -  Primary ICD-10-CM: J30.2  ICD-9-CM: 477.8     Mixed hyperlipidemia     ICD-10-CM: E78.2  ICD-9-CM: 272.2     Essential hypertension     ICD-10-CM: I10  ICD-9-CM: 401.9     Vitamin D deficiency     ICD-10-CM: E55.9  ICD-9-CM: 268.9     Gastroesophageal reflux disease without esophagitis     ICD-10-CM: K21.9  ICD-9-CM: 530.81     Healthcare maintenance     ICD-10-CM: Z00.00  ICD-9-CM: V70.0     Recurrent major depressive disorder, in partial remission (CMS/HCC)     ICD-10-CM: F33.41  ICD-9-CM: 296.35     Osteopenia of multiple sites     ICD-10-CM: M85.89  ICD-9-CM: 733.90     Generalized  osteoarthritis     ICD-10-CM: M15.9  ICD-9-CM: 715.00     Subdural hematoma (CMS/HCC)     ICD-10-CM: S06.5X9A  ICD-9-CM: 432.1     Migraine without aura and without status migrainosus, not intractable     ICD-10-CM: G43.009  ICD-9-CM: 346.10     Moderate persistent extrinsic asthma without complication     ICD-10-CM: J45.40  ICD-9-CM: 493.00     B12 deficiency     ICD-10-CM: E53.8  ICD-9-CM: 266.2     Chronic diarrhea     ICD-10-CM: K52.9  ICD-9-CM: 787.91

## 2021-02-03 ENCOUNTER — IMMUNIZATION (OUTPATIENT)
Dept: VACCINE CLINIC | Facility: HOSPITAL | Age: 76
End: 2021-02-03

## 2021-02-03 PROCEDURE — 0001A: CPT | Performed by: INTERNAL MEDICINE

## 2021-02-03 PROCEDURE — 91300 HC SARSCOV02 VAC 30MCG/0.3ML IM: CPT | Performed by: INTERNAL MEDICINE

## 2021-02-24 ENCOUNTER — IMMUNIZATION (OUTPATIENT)
Dept: VACCINE CLINIC | Facility: HOSPITAL | Age: 76
End: 2021-02-24

## 2021-02-24 PROCEDURE — 91300 HC SARSCOV02 VAC 30MCG/0.3ML IM: CPT | Performed by: INTERNAL MEDICINE

## 2021-02-24 PROCEDURE — 0002A: CPT | Performed by: INTERNAL MEDICINE

## 2021-08-23 ENCOUNTER — OFFICE VISIT (OUTPATIENT)
Dept: FAMILY MEDICINE CLINIC | Facility: CLINIC | Age: 76
End: 2021-08-23

## 2021-08-23 VITALS
RESPIRATION RATE: 14 BRPM | BODY MASS INDEX: 28.34 KG/M2 | HEIGHT: 62 IN | SYSTOLIC BLOOD PRESSURE: 152 MMHG | TEMPERATURE: 97.1 F | HEART RATE: 106 BPM | OXYGEN SATURATION: 97 % | DIASTOLIC BLOOD PRESSURE: 72 MMHG | WEIGHT: 154 LBS

## 2021-08-23 DIAGNOSIS — S06.5XAA SUBDURAL HEMATOMA (HCC): ICD-10-CM

## 2021-08-23 DIAGNOSIS — E78.2 MIXED HYPERLIPIDEMIA: ICD-10-CM

## 2021-08-23 DIAGNOSIS — K52.9 CHRONIC DIARRHEA: ICD-10-CM

## 2021-08-23 DIAGNOSIS — M85.89 OSTEOPENIA OF MULTIPLE SITES: ICD-10-CM

## 2021-08-23 DIAGNOSIS — E53.8 B12 DEFICIENCY: ICD-10-CM

## 2021-08-23 DIAGNOSIS — J45.40 MODERATE PERSISTENT EXTRINSIC ASTHMA WITHOUT COMPLICATION: ICD-10-CM

## 2021-08-23 DIAGNOSIS — J30.2 CHRONIC SEASONAL ALLERGIC RHINITIS: Primary | ICD-10-CM

## 2021-08-23 DIAGNOSIS — G43.009 MIGRAINE WITHOUT AURA AND WITHOUT STATUS MIGRAINOSUS, NOT INTRACTABLE: ICD-10-CM

## 2021-08-23 DIAGNOSIS — K21.9 GASTROESOPHAGEAL REFLUX DISEASE WITHOUT ESOPHAGITIS: ICD-10-CM

## 2021-08-23 DIAGNOSIS — F33.41 RECURRENT MAJOR DEPRESSIVE DISORDER, IN PARTIAL REMISSION (HCC): ICD-10-CM

## 2021-08-23 DIAGNOSIS — I10 ESSENTIAL HYPERTENSION: ICD-10-CM

## 2021-08-23 DIAGNOSIS — E55.9 VITAMIN D DEFICIENCY: ICD-10-CM

## 2021-08-23 DIAGNOSIS — M54.59 MECHANICAL LOW BACK PAIN: ICD-10-CM

## 2021-08-23 DIAGNOSIS — M15.9 GENERALIZED OSTEOARTHRITIS: ICD-10-CM

## 2021-08-23 DIAGNOSIS — Z00.00 HEALTHCARE MAINTENANCE: ICD-10-CM

## 2021-08-23 PROCEDURE — 99214 OFFICE O/P EST MOD 30 MIN: CPT | Performed by: GENERAL PRACTICE

## 2021-08-23 NOTE — PROGRESS NOTES
Subjective   Farzana Hawk is a 76 y.o. female.     Chief Complaint  She returns for a scheduled reassessment of multiple medical problems including asthma, hypertension, diarrhea, and grief    History of Present Illness     Asthma  She gives a long history of shortness of breath on exertion with an intermittent dry cough.  There is no history of any chest pain or hemoptysis. Her symptoms have remained well controlled since last here and she has not used albuterol.  She remains on montelukast but is no longer on an ICS-LABA.  Suspected precipitants include upper respiratory infection and allergens.    Hypertension  Home blood pressure readings: are consistently at goal. Associated signs and symptoms: dyspnea.  She continues to deny any chest pain, palpitations, orthopnea, paroxysmal nocturnal dyspnea or peripheral edema. Current antihypertensive medications includes valsartan and furosemide.  She has yet to take her medication today    Grief Reaction  She continues to struggle with her 's death but feels that she is coping.  She denies any loss of interest in activities or suicidal ideation.  Her grandson lives with her and is very supportive    Diarrhea  She gives an approximate 2 year history of intermittent diarrhea.  This is occasionally explosive and is frequently associated with mild cramping.  There is no history of any nausea, vomiting, hematochezia, or melena and she continues to deny any fever, chills, or night sweats.  She has occasional episodes at night but the vast majority are during the day and remain unpredictable    Left Subdural Hematoma  She was discharged from [facility] on 3/14/2020 after undergoing evacuation of a left subdural hematoma.  Her course in hospital was uncomplicated.  While she continues to have intermittent headaches these are no different then she has had in the past.  She continues to deny any problems with her speech, vision, strength, or sensation.   She underwent a  neurosurgical reassessment since last here at which time no further follow up was felt to be necessary    The following portions of the patient's history were reviewed and updated as appropriate: allergies, current medications, past medical history, past social history and problem list.    Review of Systems   Constitutional: Positive for fatigue. Negative for appetite change, chills, fever and unexpected weight change.   HENT: Positive for rhinorrhea. Negative for congestion, ear pain, sneezing and sore throat.    Eyes: Negative for visual disturbance.   Respiratory: Positive for cough and shortness of breath (with exertion). Negative for wheezing.    Cardiovascular: Negative for chest pain, palpitations and leg swelling.   Gastrointestinal: Positive for diarrhea. Negative for abdominal pain, blood in stool, constipation, nausea and vomiting.   Genitourinary: Negative for dysuria, frequency, hematuria and urgency.   Musculoskeletal: Positive for arthralgias and back pain. Negative for joint swelling and myalgias.   Skin: Negative for rash.   Neurological: Positive for headaches. Negative for weakness and numbness.   Psychiatric/Behavioral: Positive for dysphoric mood and sleep disturbance. Negative for suicidal ideas. The patient is nervous/anxious.      Objective   Physical Exam  Constitutional:       General: She is not in acute distress.     Appearance: Normal appearance. She is well-developed. She is not diaphoretic.      Comments: Bright and in good spirits. No apparent distress. No pallor, jaundice, diaphoresis, or cyanosis.   HENT:      Head: Atraumatic.      Right Ear: Tympanic membrane, ear canal and external ear normal.      Left Ear: Tympanic membrane, ear canal and external ear normal.   Eyes:      Conjunctiva/sclera: Conjunctivae normal.   Neck:      Thyroid: No thyroid mass or thyromegaly.      Vascular: No carotid bruit or JVD.      Trachea: Trachea normal. No tracheal deviation.   Cardiovascular:       Rate and Rhythm: Normal rate and regular rhythm.      Heart sounds: Normal heart sounds, S1 normal and S2 normal. No murmur heard.   No gallop.    Pulmonary:      Effort: Pulmonary effort is normal.      Breath sounds: Normal breath sounds.   Abdominal:      General: Bowel sounds are normal. There is no distension.   Musculoskeletal:      Right lower leg: No edema.      Left lower leg: No edema.   Lymphadenopathy:      Head:      Right side of head: No submental, submandibular, tonsillar, preauricular, posterior auricular or occipital adenopathy.      Left side of head: No submental, submandibular, tonsillar, preauricular, posterior auricular or occipital adenopathy.      Cervical: No cervical adenopathy.      Upper Body:      Right upper body: No supraclavicular adenopathy.      Left upper body: No supraclavicular adenopathy.   Skin:     General: Skin is warm.      Coloration: Skin is not cyanotic, jaundiced or pale.      Findings: No rash.      Nails: There is no clubbing.   Neurological:      Mental Status: She is alert and oriented to person, place, and time.      Cranial Nerves: No cranial nerve deficit.      Motor: No tremor.      Coordination: Coordination normal.      Gait: Gait normal.   Psychiatric:         Attention and Perception: Attention normal.         Mood and Affect: Mood is depressed (when discussing her husbands death -also smiled at times when appropriate). Affect is not inappropriate.         Speech: Speech normal.         Behavior: Behavior normal.         Thought Content: Thought content normal. Thought content does not include suicidal ideation.       Assessment/Plan   Problems Addressed this Visit        Allergies and Adverse Reactions    Chronic seasonal allergic rhinitis  Reminded regarding allergen avoidance.  Continue current medication       Cardiac and Vasculature    Essential hypertension   Hypertension: elevated today. Evidence of target organ damage: none.  Encouraged to continue  to work on diet and exercise plan.   Continue current medication for now  If her blood pressure remains elevated at her return will likely titrate valsartan    Mixed hyperlipidemia  As above.  Updated labs will be drawn at her return.\       Endocrine and Metabolic    B12 deficiency  Continue supplementation with monitoring.    Vitamin D deficiency  As above.       Gastrointestinal Abdominal     Chronic diarrhea  Patient remains uninterested in a GI assessment  Encouraged to report if any worse or if any new symptoms or concerns.    Gastroesophageal reflux disease without esophagitis       Health Encounters    Healthcare maintenance  Patient has received both doses of the COVID-19 vaccine.  Reminded to get a flu shot when available.  She is uninterested in an updated mammogram but is willing to pursue a DEXA scan       Mental Health    Recurrent major depressive disorder, in partial remission (CMS/HCC)  Significant situational component.   Supportive therapy.   Encouraged to report if any worse or if any new symptoms or concerns.       Musculoskeletal and Injuries    Generalized osteoarthritis    Mechanical low back pain    Osteopenia of multiple sites  Encouraged to continue to pursue weight bearing activities while exercising joint protection.  As above.    Relevant Orders    DEXA Bone Density Axial       Neuro    Migraine without aura and without status migrainosus, not intractable  Chronic.  Stable.  Encouraged to report if this should change.    Subdural hematoma (CMS/HCC)  No apparent sequela  Will continue to monitor       Pulmonary and Pneumonias    Extrinsic asthma without complication  Mild persistent asthma. The patient is not currently having an exacerbation. In general, the patient's disease is well controlled.  Encouraged to report if this should change.  Continue current medication      Diagnoses       Codes Comments    Chronic seasonal allergic rhinitis    -  Primary ICD-10-CM: J30.2  ICD-9-CM: 477.8      Mixed hyperlipidemia     ICD-10-CM: E78.2  ICD-9-CM: 272.2     Essential hypertension     ICD-10-CM: I10  ICD-9-CM: 401.9     Vitamin D deficiency     ICD-10-CM: E55.9  ICD-9-CM: 268.9     B12 deficiency     ICD-10-CM: E53.8  ICD-9-CM: 266.2     Gastroesophageal reflux disease without esophagitis     ICD-10-CM: K21.9  ICD-9-CM: 530.81     Chronic diarrhea     ICD-10-CM: K52.9  ICD-9-CM: 787.91     Healthcare maintenance     ICD-10-CM: Z00.00  ICD-9-CM: V70.0     Recurrent major depressive disorder, in partial remission (CMS/HCC)     ICD-10-CM: F33.41  ICD-9-CM: 296.35     Osteopenia of multiple sites     ICD-10-CM: M85.89  ICD-9-CM: 733.90     Mechanical low back pain     ICD-10-CM: M54.5  ICD-9-CM: 724.2     Generalized osteoarthritis     ICD-10-CM: M15.9  ICD-9-CM: 715.00     Subdural hematoma (CMS/HCC)     ICD-10-CM: S06.5X9A  ICD-9-CM: 432.1     Moderate persistent extrinsic asthma without complication     ICD-10-CM: J45.40  ICD-9-CM: 493.00     Migraine without aura and without status migrainosus, not intractable     ICD-10-CM: G43.009  ICD-9-CM: 346.10

## 2021-09-07 DIAGNOSIS — M15.9 GENERALIZED OSTEOARTHRITIS: ICD-10-CM

## 2021-09-07 RX ORDER — ERGOCALCIFEROL 1.25 MG/1
CAPSULE ORAL
Qty: 4 CAPSULE | Refills: 5 | Status: SHIPPED | OUTPATIENT
Start: 2021-09-07

## 2021-09-07 RX ORDER — TRAMADOL HYDROCHLORIDE 50 MG/1
TABLET ORAL
Qty: 120 TABLET | Refills: 2 | Status: SHIPPED | OUTPATIENT
Start: 2021-09-07 | End: 2022-05-19

## 2021-09-09 ENCOUNTER — APPOINTMENT (OUTPATIENT)
Dept: BONE DENSITY | Facility: HOSPITAL | Age: 76
End: 2021-09-09

## 2021-09-15 ENCOUNTER — TELEPHONE (OUTPATIENT)
Dept: FAMILY MEDICINE CLINIC | Facility: CLINIC | Age: 76
End: 2021-09-15

## 2021-09-15 ENCOUNTER — LAB (OUTPATIENT)
Dept: FAMILY MEDICINE CLINIC | Facility: CLINIC | Age: 76
End: 2021-09-15

## 2021-09-15 DIAGNOSIS — U07.1 COVID-19: Primary | ICD-10-CM

## 2021-09-15 PROCEDURE — U0004 COV-19 TEST NON-CDC HGH THRU: HCPCS | Performed by: GENERAL PRACTICE

## 2021-09-15 NOTE — TELEPHONE ENCOUNTER
Patient is aware to come in for a covid test today.    ----- Message from Luis Sim MD sent at 9/15/2021  2:07 PM EDT -----  Please call Farzana BERNARD and have her come in this afternoon for a covid test

## 2021-09-16 ENCOUNTER — HOSPITAL ENCOUNTER (OUTPATIENT)
Dept: INFUSION THERAPY | Facility: HOSPITAL | Age: 76
Discharge: HOME | End: 2021-09-16
Admitting: GENERAL PRACTICE

## 2021-09-16 VITALS
HEART RATE: 98 BPM | RESPIRATION RATE: 20 BRPM | TEMPERATURE: 100.1 F | DIASTOLIC BLOOD PRESSURE: 81 MMHG | OXYGEN SATURATION: 96 % | SYSTOLIC BLOOD PRESSURE: 149 MMHG

## 2021-09-16 DIAGNOSIS — U07.1 COVID-19: Primary | ICD-10-CM

## 2021-09-16 LAB — SARS-COV-2 RNA PNL SPEC NAA+PROBE: DETECTED

## 2021-09-16 PROCEDURE — 25010000006 INJECTION, CASIRIVIMAB AND IMDEVIMAB, 1200 MG: Performed by: GENERAL PRACTICE

## 2021-09-16 PROCEDURE — M0243 CASIRIVI AND IMDEVI INFUSION: HCPCS | Performed by: GENERAL PRACTICE

## 2021-09-16 RX ORDER — DIPHENHYDRAMINE HCL 50 MG
50 CAPSULE ORAL ONCE AS NEEDED
Status: CANCELLED | OUTPATIENT
Start: 2021-09-16

## 2021-09-16 RX ORDER — METHYLPREDNISOLONE SODIUM SUCCINATE 125 MG/2ML
125 INJECTION, POWDER, LYOPHILIZED, FOR SOLUTION INTRAMUSCULAR; INTRAVENOUS AS NEEDED
Status: DISCONTINUED | OUTPATIENT
Start: 2021-09-16 | End: 2021-09-18 | Stop reason: HOSPADM

## 2021-09-16 RX ORDER — EPINEPHRINE 1 MG/ML
0.3 INJECTION, SOLUTION INTRAMUSCULAR; SUBCUTANEOUS AS NEEDED
Status: CANCELLED | OUTPATIENT
Start: 2021-09-16

## 2021-09-16 RX ORDER — DIPHENHYDRAMINE HCL 50 MG
50 CAPSULE ORAL ONCE AS NEEDED
Status: DISCONTINUED | OUTPATIENT
Start: 2021-09-16 | End: 2021-09-18 | Stop reason: HOSPADM

## 2021-09-16 RX ORDER — DIPHENHYDRAMINE HYDROCHLORIDE 50 MG/ML
50 INJECTION INTRAMUSCULAR; INTRAVENOUS ONCE AS NEEDED
Status: DISCONTINUED | OUTPATIENT
Start: 2021-09-16 | End: 2021-09-18 | Stop reason: HOSPADM

## 2021-09-16 RX ORDER — METHYLPREDNISOLONE SODIUM SUCCINATE 125 MG/2ML
125 INJECTION, POWDER, LYOPHILIZED, FOR SOLUTION INTRAMUSCULAR; INTRAVENOUS AS NEEDED
Status: CANCELLED | OUTPATIENT
Start: 2021-09-16

## 2021-09-16 RX ORDER — DIPHENHYDRAMINE HYDROCHLORIDE 50 MG/ML
50 INJECTION INTRAMUSCULAR; INTRAVENOUS ONCE AS NEEDED
Status: CANCELLED | OUTPATIENT
Start: 2021-09-16

## 2021-09-16 RX ORDER — EPINEPHRINE 1 MG/ML
0.3 INJECTION, SOLUTION INTRAMUSCULAR; SUBCUTANEOUS AS NEEDED
Status: DISCONTINUED | OUTPATIENT
Start: 2021-09-16 | End: 2021-09-18 | Stop reason: HOSPADM

## 2021-09-16 RX ADMIN — CASIRIVIMAB AND IMDEVIMAB: 600; 600 INJECTION, SOLUTION, CONCENTRATE INTRAVENOUS at 10:43

## 2021-11-02 ENCOUNTER — FLU SHOT (OUTPATIENT)
Dept: FAMILY MEDICINE CLINIC | Facility: CLINIC | Age: 76
End: 2021-11-02

## 2021-11-02 DIAGNOSIS — J45.40 MODERATE PERSISTENT EXTRINSIC ASTHMA WITHOUT COMPLICATION: Primary | ICD-10-CM

## 2021-11-02 DIAGNOSIS — Z23 NEED FOR INFLUENZA VACCINATION: Primary | ICD-10-CM

## 2021-11-02 PROCEDURE — G0008 ADMIN INFLUENZA VIRUS VAC: HCPCS | Performed by: GENERAL PRACTICE

## 2021-11-02 PROCEDURE — 90662 IIV NO PRSV INCREASED AG IM: CPT | Performed by: GENERAL PRACTICE

## 2021-11-02 RX ORDER — ALBUTEROL SULFATE 90 UG/1
2 AEROSOL, METERED RESPIRATORY (INHALATION) EVERY 4 HOURS PRN
Qty: 16 G | Refills: 5 | Status: SHIPPED | OUTPATIENT
Start: 2021-11-02 | End: 2022-11-19 | Stop reason: SDUPTHER

## 2021-12-07 ENCOUNTER — OFFICE VISIT (OUTPATIENT)
Dept: FAMILY MEDICINE CLINIC | Facility: CLINIC | Age: 76
End: 2021-12-07

## 2021-12-07 DIAGNOSIS — E53.8 B12 DEFICIENCY: ICD-10-CM

## 2021-12-07 DIAGNOSIS — K52.9 CHRONIC DIARRHEA: ICD-10-CM

## 2021-12-07 DIAGNOSIS — F33.41 RECURRENT MAJOR DEPRESSIVE DISORDER, IN PARTIAL REMISSION (HCC): ICD-10-CM

## 2021-12-07 DIAGNOSIS — E55.9 VITAMIN D DEFICIENCY: ICD-10-CM

## 2021-12-07 DIAGNOSIS — G43.009 MIGRAINE WITHOUT AURA AND WITHOUT STATUS MIGRAINOSUS, NOT INTRACTABLE: ICD-10-CM

## 2021-12-07 DIAGNOSIS — J30.2 CHRONIC SEASONAL ALLERGIC RHINITIS: Primary | ICD-10-CM

## 2021-12-07 DIAGNOSIS — I10 ESSENTIAL HYPERTENSION: ICD-10-CM

## 2021-12-07 DIAGNOSIS — J45.40 MODERATE PERSISTENT EXTRINSIC ASTHMA WITHOUT COMPLICATION: ICD-10-CM

## 2021-12-07 DIAGNOSIS — M15.9 GENERALIZED OSTEOARTHRITIS: ICD-10-CM

## 2021-12-07 DIAGNOSIS — S06.5XAA SUBDURAL HEMATOMA (HCC): ICD-10-CM

## 2021-12-07 DIAGNOSIS — M85.89 OSTEOPENIA OF MULTIPLE SITES: ICD-10-CM

## 2021-12-07 DIAGNOSIS — K21.9 GASTROESOPHAGEAL REFLUX DISEASE WITHOUT ESOPHAGITIS: ICD-10-CM

## 2021-12-07 DIAGNOSIS — Z86.16 HISTORY OF COVID-19: ICD-10-CM

## 2021-12-07 DIAGNOSIS — Z00.00 HEALTHCARE MAINTENANCE: ICD-10-CM

## 2021-12-07 DIAGNOSIS — E78.2 MIXED HYPERLIPIDEMIA: ICD-10-CM

## 2021-12-07 PROCEDURE — 99214 OFFICE O/P EST MOD 30 MIN: CPT | Performed by: GENERAL PRACTICE

## 2021-12-07 NOTE — PROGRESS NOTES
Subjective   Farzana Hawk is a 76 y.o. female.     Chief Complaint  She returns for a scheduled reassessment of multiple medical problems including asthma, hypertension, gastroesophageal reflux disease, chronic diarrhea, and previous left subdural hematoma    History of Present Illness     Asthma  She gives a long history of shortness of breath on exertion with an intermittent dry cough.  There is no history of any chest pain or hemoptysis.  With inhaled fluticasone/vilanterol she has noted a further improvement in her symptoms.  She remains on montelukast. She has not used albuterol for over a month.  Suspected precipitants include upper respiratory infection and allergens.    Hypertension  Home blood pressure readings: are consistently at goal. Associated signs and symptoms: dyspnea.  She continues to deny any chest pain, palpitations, orthopnea, paroxysmal nocturnal dyspnea or peripheral edema. Current antihypertensive medications includes valsartan and furosemide.     Gastroesophageal Reflux Disease  She is taking lansoprazole consistently and remains heartburn free.  She denies any difficulty swallowing, nausea, or vomiting.    Diarrhea  She gives an approximate 2 year history of intermittent diarrhea.  This is occasionally explosive and is frequently associated with mild cramping.  There is no history of any nausea, vomiting, hematochezia, or melena and she continues to deny any fever, chills, or night sweats.  She has occasional episodes at night but the vast majority are during the day and remain unpredictable.  She has been unable to identify any exacerbating factors    Left Subdural Hematoma  She was discharged from [facility] on 3/14/2020 after undergoing evacuation of a left subdural hematoma.  Her course in hospital was uncomplicated.  While she continues to have intermittent headaches these are no different then she has had in the past.  She continues to deny any problems with her speech, vision,  strength, or sensation.      History of COVID-19  She received an IV monoclonal antibody infusion on 9/16/2021 and denies any sequela.  She apparently received a third dose of the Pfizer COVID-19 vaccine over 4 weeks ago.    The following portions of the patient's history were reviewed and updated as appropriate: allergies, current medications, past medical history, past social history and problem list.    Review of Systems   Constitutional: Positive for fatigue. Negative for appetite change, chills, fever and unexpected weight change.   HENT: Positive for rhinorrhea. Negative for congestion, ear pain, sneezing and sore throat.    Eyes: Negative for visual disturbance.   Respiratory: Positive for cough and shortness of breath (with exertion). Negative for wheezing.    Cardiovascular: Negative for chest pain, palpitations and leg swelling.   Gastrointestinal: Positive for diarrhea. Negative for abdominal pain, blood in stool, constipation, nausea and vomiting.   Genitourinary: Negative for dysuria, frequency, hematuria and urgency.   Musculoskeletal: Positive for arthralgias and back pain. Negative for joint swelling and myalgias.   Skin: Negative for rash.   Neurological: Positive for headaches. Negative for weakness and numbness.   Psychiatric/Behavioral: Positive for dysphoric mood and sleep disturbance. Negative for suicidal ideas. The patient is nervous/anxious.      Objective   Physical Exam  Constitutional:       General: She is not in acute distress.     Appearance: Normal appearance. She is well-developed. She is not diaphoretic.      Comments: Bright and in good spirits. No apparent distress. No pallor, jaundice, diaphoresis, or cyanosis.   HENT:      Head: Atraumatic.      Right Ear: Tympanic membrane, ear canal and external ear normal.      Left Ear: Tympanic membrane, ear canal and external ear normal.   Eyes:      Conjunctiva/sclera: Conjunctivae normal.   Neck:      Thyroid: No thyroid mass or  thyromegaly.      Vascular: No carotid bruit or JVD.      Trachea: Trachea normal. No tracheal deviation.   Cardiovascular:      Rate and Rhythm: Normal rate and regular rhythm.      Heart sounds: Normal heart sounds, S1 normal and S2 normal. No murmur heard.  No gallop.    Pulmonary:      Effort: Pulmonary effort is normal.      Breath sounds: Normal breath sounds.   Chest:   Breasts:      Right: No supraclavicular adenopathy.      Left: No supraclavicular adenopathy.       Abdominal:      General: Bowel sounds are normal. There is no distension.   Musculoskeletal:      Right lower leg: No edema.      Left lower leg: No edema.   Lymphadenopathy:      Head:      Right side of head: No submental, submandibular, tonsillar, preauricular, posterior auricular or occipital adenopathy.      Left side of head: No submental, submandibular, tonsillar, preauricular, posterior auricular or occipital adenopathy.      Cervical: No cervical adenopathy.      Upper Body:      Right upper body: No supraclavicular adenopathy.      Left upper body: No supraclavicular adenopathy.   Skin:     General: Skin is warm.      Coloration: Skin is not cyanotic, jaundiced or pale.      Findings: No rash.      Nails: There is no clubbing.   Neurological:      Mental Status: She is alert and oriented to person, place, and time.      Cranial Nerves: No cranial nerve deficit.      Motor: No tremor.      Coordination: Coordination normal.      Gait: Gait normal.   Psychiatric:         Attention and Perception: Attention normal.         Mood and Affect: Mood normal.         Speech: Speech normal.         Behavior: Behavior normal.         Thought Content: Thought content normal.       Assessment/Plan   Problems Addressed this Visit        Allergies and Adverse Reactions    Chronic seasonal allergic rhinitis        Cardiac and Vasculature    Essential hypertension   Hypertension: marginal. Evidence of target organ damage: none.  Encouraged to continue  to work on diet and exercise plan.   Continue current medication    Relevant Orders    CBC & Differential    Comprehensive Metabolic Panel    Mixed hyperlipidemia  As above.   Continue current medication.  Scheduled for updated labs    Relevant Orders    Comprehensive Metabolic Panel    Lipid Panel    TSH       Endocrine and Metabolic    B12 deficiency  Continue supplementation with monitoring.    Relevant Orders    Vitamin B12    Vitamin D deficiency  As above.    Relevant Orders    Vitamin D 25 Hydroxy       Gastrointestinal Abdominal     Chronic diarrhea  Patient remains uninterested in a GI assessment.   Encouraged to report if any worse or if any new symptoms or concerns.    Gastroesophageal reflux disease without esophagitis       Health Encounters    Healthcare maintenance  Patient has already received a flu shot fall.       Mental Health    Recurrent major depressive disorder, in partial remission (HCC)  Significant situational component.   Supportive therapy.   Encouraged to report if any worse or if any new symptoms or concerns.       Musculoskeletal and Injuries    Generalized osteoarthritis    Osteopenia of multiple sites       Neuro    Migraine without aura and without status migrainosus, not intractable  Encouraged to report if any worse or if any new symptoms or concerns.    Subdural hematoma (HCC)  Reminded regarding fall avoidance       Pulmonary and Pneumonias    Extrinsic asthma without complication  Mild persistent asthma. The patient is not currently having an exacerbation. In general, the patient's disease is well controlled.  Encouraged to report if this should change.  Continue current medication       Other    History of COVID-19      Diagnoses       Codes Comments    Chronic seasonal allergic rhinitis    -  Primary ICD-10-CM: J30.2  ICD-9-CM: 477.8     Essential hypertension     ICD-10-CM: I10  ICD-9-CM: 401.9     Mixed hyperlipidemia     ICD-10-CM: E78.2  ICD-9-CM: 272.2     B12 deficiency      ICD-10-CM: E53.8  ICD-9-CM: 266.2     Vitamin D deficiency     ICD-10-CM: E55.9  ICD-9-CM: 268.9     Chronic diarrhea     ICD-10-CM: K52.9  ICD-9-CM: 787.91     Gastroesophageal reflux disease without esophagitis     ICD-10-CM: K21.9  ICD-9-CM: 530.81     Healthcare maintenance     ICD-10-CM: Z00.00  ICD-9-CM: V70.0     Recurrent major depressive disorder, in partial remission (HCC)     ICD-10-CM: F33.41  ICD-9-CM: 296.35     Generalized osteoarthritis     ICD-10-CM: M15.9  ICD-9-CM: 715.00     Osteopenia of multiple sites     ICD-10-CM: M85.89  ICD-9-CM: 733.90     Migraine without aura and without status migrainosus, not intractable     ICD-10-CM: G43.009  ICD-9-CM: 346.10     Moderate persistent extrinsic asthma without complication     ICD-10-CM: J45.40  ICD-9-CM: 493.00     History of COVID-19     ICD-10-CM: Z86.16  ICD-9-CM: V12.09     Subdural hematoma (HCC)     ICD-10-CM: S06.5X9A  ICD-9-CM: 432.1

## 2021-12-08 VITALS
TEMPERATURE: 97.3 F | HEIGHT: 62 IN | SYSTOLIC BLOOD PRESSURE: 136 MMHG | BODY MASS INDEX: 27.79 KG/M2 | OXYGEN SATURATION: 97 % | DIASTOLIC BLOOD PRESSURE: 68 MMHG | WEIGHT: 151 LBS | RESPIRATION RATE: 14 BRPM | HEART RATE: 82 BPM

## 2022-03-29 DIAGNOSIS — K21.9 GASTROESOPHAGEAL REFLUX DISEASE WITHOUT ESOPHAGITIS: ICD-10-CM

## 2022-03-29 RX ORDER — LANSOPRAZOLE 30 MG/1
30 CAPSULE, DELAYED RELEASE ORAL DAILY
Qty: 90 CAPSULE | Refills: 3 | Status: SHIPPED | OUTPATIENT
Start: 2022-03-29 | End: 2022-11-19 | Stop reason: SDUPTHER

## 2022-03-29 NOTE — TELEPHONE ENCOUNTER
Caller: Farzana Hawk    Relationship: Self    Best call back number     Requested Prescriptions:   Requested Prescriptions     Pending Prescriptions Disp Refills   • lansoprazole (PREVACID) 30 MG capsule 90 capsule 3     Sig: Take 1 capsule by mouth Daily.        Pharmacy where request should be sent: [facility] - [address] [phone] [facility] [phone] [facility]     Additional details provided by patient: PATIENT IS RUNNING LOW    Does the patient have less than a 3 day supply:  [] Yes  [x] No    Quirino Salazar Rep   03/29/22 11:26 EDT

## 2022-05-02 ENCOUNTER — TELEPHONE (OUTPATIENT)
Dept: FAMILY MEDICINE CLINIC | Facility: CLINIC | Age: 77
End: 2022-05-02

## 2022-05-02 NOTE — TELEPHONE ENCOUNTER
Caller: Farzana Hawk    Relationship to patient: Self    Best call back number: [phone]    Chief complaint: N A    Type of visit: WELLNESS    Requested date: WEEK OF 06/01/22    If rescheduling, when is the original appointment: 05/02/22    Additional notes:

## 2022-05-19 DIAGNOSIS — M15.9 GENERALIZED OSTEOARTHRITIS: ICD-10-CM

## 2022-05-19 RX ORDER — TRAMADOL HYDROCHLORIDE 50 MG/1
TABLET ORAL
Qty: 120 TABLET | Refills: 0 | Status: SHIPPED | OUTPATIENT
Start: 2022-05-19 | End: 2022-07-19

## 2022-07-19 DIAGNOSIS — M15.9 GENERALIZED OSTEOARTHRITIS: ICD-10-CM

## 2022-07-19 RX ORDER — TRAMADOL HYDROCHLORIDE 50 MG/1
TABLET ORAL
Qty: 120 TABLET | Refills: 0 | Status: SHIPPED | OUTPATIENT
Start: 2022-07-19 | End: 2022-08-25

## 2022-08-25 DIAGNOSIS — M15.9 GENERALIZED OSTEOARTHRITIS: ICD-10-CM

## 2022-08-25 RX ORDER — TRAMADOL HYDROCHLORIDE 50 MG/1
TABLET ORAL
Qty: 120 TABLET | Refills: 0 | Status: SHIPPED | OUTPATIENT
Start: 2022-08-25 | End: 2022-09-19

## 2022-09-19 DIAGNOSIS — M15.9 GENERALIZED OSTEOARTHRITIS: ICD-10-CM

## 2022-09-19 RX ORDER — TRAMADOL HYDROCHLORIDE 50 MG/1
TABLET ORAL
Qty: 120 TABLET | Refills: 0 | Status: SHIPPED | OUTPATIENT
Start: 2022-09-19 | End: 2022-10-25 | Stop reason: SDUPTHER

## 2022-10-17 ENCOUNTER — OFFICE VISIT (OUTPATIENT)
Dept: FAMILY MEDICINE CLINIC | Facility: CLINIC | Age: 77
End: 2022-10-17

## 2022-10-17 VITALS
HEART RATE: 67 BPM | BODY MASS INDEX: 27.57 KG/M2 | TEMPERATURE: 97.7 F | HEIGHT: 62 IN | WEIGHT: 149.8 LBS | OXYGEN SATURATION: 98 % | DIASTOLIC BLOOD PRESSURE: 80 MMHG | SYSTOLIC BLOOD PRESSURE: 126 MMHG | RESPIRATION RATE: 20 BRPM

## 2022-10-17 DIAGNOSIS — R23.3 EASY BRUISING: Primary | ICD-10-CM

## 2022-10-17 LAB
APTT PPP: 31 SECONDS (ref 26.5–34.5)
FIBRINOGEN PPP-MCNC: 528 MG/DL (ref 173–524)
INR PPP: 1 (ref 0.9–1.1)
PROTHROMBIN TIME: 13.4 SECONDS (ref 12.1–14.7)

## 2022-10-17 PROCEDURE — 85025 COMPLETE CBC W/AUTO DIFF WBC: CPT | Performed by: PHYSICIAN ASSISTANT

## 2022-10-17 PROCEDURE — 85384 FIBRINOGEN ACTIVITY: CPT | Performed by: PHYSICIAN ASSISTANT

## 2022-10-17 PROCEDURE — 99214 OFFICE O/P EST MOD 30 MIN: CPT | Performed by: PHYSICIAN ASSISTANT

## 2022-10-17 PROCEDURE — 85610 PROTHROMBIN TIME: CPT | Performed by: PHYSICIAN ASSISTANT

## 2022-10-17 PROCEDURE — 85730 THROMBOPLASTIN TIME PARTIAL: CPT | Performed by: PHYSICIAN ASSISTANT

## 2022-10-17 NOTE — PATIENT INSTRUCTIONS
Advance Care Planning and Advance Directives     You make decisions on a daily basis - decisions about where you want to live, your career, your home, your life. Perhaps one of the most important decisions you face is your choice for future medical care. Take time to talk with your family and your healthcare team and start planning today.  Advance Care Planning is a process that can help you:  Understand possible future healthcare decisions in light of your own experiences  Reflect on those decision in light of your goals and values  Discuss your decisions with those closest to you and the healthcare professionals that care for you  Make a plan by creating a document that reflects your wishes    Surrogate Decision Maker  In the event of a medical emergency, which has left you unable to communicate or to make your own decisions, you would need someone to make decisions for you.  It is important to discuss your preferences for medical treatment with this person while you are in good health.     Qualities of a surrogate decision maker:  Willing to take on this role and responsibility  Knows what you want for future medical care  Willing to follow your wishes even if they don't agree with them  Able to make difficult medical decisions under stressful circumstances    Advance Directives  These are legal documents you can create that will guide your healthcare team and decision maker(s) when needed. These documents can be stored in the electronic medical record.    Living Will - a legal document to guide your care if you have a terminal condition or a serious illness and are unable to communicate. States vary by statute in document names/types, but most forms may include one or more of the following:        -  Directions regarding life-prolonging treatments        -  Directions regarding artificially provided nutrition/hydration        -  Choosing a healthcare decision maker        -  Direction regarding organ/tissue  donation    Durable Power of  for Healthcare - this document names an -in-fact to make medical decisions for you, but it may also allow this person to make personal and financial decisions for you. Please seek the advice of an  if you need this type of document.    **Advance Directives are not required and no one may discriminate against you if you do not sign one.    Medical Orders  Many states allow specific forms/orders signed by your physician to record your wishes for medical treatment in your current state of health. This form, signed in personal communication with your physician, addresses resuscitation and other medical interventions that you may or may not want.      For more information or to schedule a time with a [facility] Advance Care Planning Facilitator contact: [email or web address] or call [phone] and someone will contact you directly.

## 2022-10-17 NOTE — PROGRESS NOTES
Subjective        Chief Complaint  Bleeding/Bruising    Subjective      History of Present Illness  Farzana Hawk is a 77 y.o. female who presents today to [facility] for Bleeding/Bruising. Past medical history is significant for previous spontaneous subdural hematoma in 03/2020, asthma, HTN, chronic diarrhea, osteoarthritis, depression, and history of migraine headaches.    Bleeding/Bruising:  She reports about 3 weeks of bruising to her left lower extremity.  She denies any falls or known injury.  She does not take any over-the-counter NSAIDs, aspirin, or chronic blood thinners.  Denies any recent changes to her medications.  She has had some pain behind the left knee since the bruising developed.  Bruising is located in the anterior and posterior left knee as well as the medial left thigh.  Denies any epistaxis, bleeding gums, or other bruising noted.  As noted above, she has a previous history of spontaneous subdural hematoma.  She has not had any recent labs.       Current Outpatient Medications:   •  acetaminophen-codeine (TYLENOL #3) 300-30 MG per tablet, Take 1 tablet by mouth 2 (Two) Times a Day As Needed for Moderate Pain ., Disp: 60 tablet, Rfl: 2  •  albuterol sulfate  (90 Base) MCG/ACT inhaler, Inhale 2 puffs Every 4 (Four) Hours As Needed for Wheezing., Disp: 16 g, Rfl: 5  •  benzonatate (TESSALON) 100 MG capsule, Take 1 capsule by mouth 3 (Three) Times a Day As Needed for Cough., Disp: 90 capsule, Rfl: 5  •  cyanocobalamin 1000 MCG/ML injection, Inject 1 mL under the skin into the appropriate area as directed Every 30 (Thirty) Days. 1 mL IM weekly x 4, then every 2 weeks x 2, then monthly afterward, Disp: 1 mL, Rfl: 5  •  Fluticasone Furoate-Vilanterol (BREO ELLIPTA) 200-25 MCG/INH inhaler, Inhale 1 puff Daily., Disp: 3 each, Rfl: 3  •  furosemide (LASIX) 20 MG tablet, Take 1 tablet by mouth Every Morning., Disp: 90 tablet, Rfl: 3  •  lansoprazole  "(PREVACID) 30 MG capsule, Take 1 capsule by mouth Daily., Disp: 90 capsule, Rfl: 3  •  levocetirizine (XYZAL) 5 MG tablet, Take 1 tablet by mouth Daily., Disp: 90 tablet, Rfl: 3  •  montelukast (SINGULAIR) 10 MG tablet, Take 1 tablet by mouth Daily., Disp: 90 tablet, Rfl: 3  •  promethazine (PHENERGAN) 25 MG tablet, Take 1 tablet by mouth Every 6 (Six) Hours As Needed for Nausea or Vomiting., Disp: 60 tablet, Rfl: 0  •  topiramate (TOPAMAX) 200 MG tablet, Take 1 tablet by mouth 2 (Two) Times a Day., Disp: 180 tablet, Rfl: 3  •  traMADol (ULTRAM) 50 MG tablet, TAKE ONE TABLET BY MOUTH FOUR TIMES A DAY, Disp: 120 tablet, Rfl: 0  •  valsartan (DIOVAN) 80 MG tablet, Take 1 tablet by mouth Every Night., Disp: 90 tablet, Rfl: 3  •  vitamin D (ERGOCALCIFEROL) 1.25 MG (29784 UT) capsule capsule, TAKE ONE CAPSULE BY MOUTH ONCE WEEKLY, Disp: 4 capsule, Rfl: 5  •  Zoster Vac Recomb Adjuvanted (SHINGRIX) 50 MCG reconstituted suspension, Inject 50 mcg into the shoulder, thigh, or buttocks See Admin Instructions. Repeat in 2-6 months, Disp: 1 each, Rfl: 1      No Known Allergies    Objective     Objective   Vital Signs:  Blood Pressure 126/80 (BP Location: Left arm, Patient Position: Sitting, Cuff Size: Adult)   Pulse 67   Temperature 97.7 °F (36.5 °C) (Temporal)   Respiration 20   Height 157.5 cm (62.01\")   Weight 67.9 kg (149 lb 12.8 oz)   Oxygen Saturation 98%   Body Mass Index 27.39 kg/m²   Estimated body mass index is 27.39 kg/m² as calculated from the following:    Height as of this encounter: 157.5 cm (62.01\").    Weight as of this encounter: 67.9 kg (149 lb 12.8 oz).        Past Medical History:   Diagnosis Date   • Cervicalgia    • Chronic allergic rhinitis 8/1/2016   • Depression 8/1/2016   • Essential hypertension 8/1/2016   • Extrinsic asthma without complication 8/1/2016   • Gastroesophageal reflux disease without esophagitis 8/1/2016   • Generalized osteoarthritis 8/1/2016   • Hyperlipidemia 8/1/2016   • " Mechanical low back pain    • Migraine without aura 8/1/2016   • Postmenopausal osteoporosis 8/1/2016   • Vitamin D deficiency 8/1/2016     Past Surgical History:   Procedure Laterality Date   • TOTAL ABDOMINAL HYSTERECTOMY WITH SALPINGO OOPHORECTOMY       Social History     Socioeconomic History   • Marital status:    Tobacco Use   • Smoking status: Never   • Smokeless tobacco: Never   Vaping Use   • Vaping Use: Never used   Substance and Sexual Activity   • Alcohol use: No   • Drug use: No   • Sexual activity: Defer      Physical Exam  Vitals and nursing note reviewed.   Constitutional:       General: She is not in acute distress.     Appearance: She is well-developed. She is not diaphoretic.   HENT:      Head: Normocephalic and atraumatic.   Eyes:      General: No scleral icterus.        Right eye: No discharge.         Left eye: No discharge.      Conjunctiva/sclera: Conjunctivae normal.   Cardiovascular:      Rate and Rhythm: Normal rate.   Pulmonary:      Effort: Pulmonary effort is normal. No respiratory distress.      Breath sounds: No wheezing.   Musculoskeletal:         General: Normal range of motion.      Cervical back: Normal range of motion and neck supple.      Right lower leg: No edema.      Left lower leg: No edema.   Skin:     General: Skin is warm and dry.      Coloration: Skin is not pale.      Findings: Bruising present. No erythema or rash.      Comments: Bruising is located in the anterior and posterior left knee as well as the medial left thigh. Mild tenderness to palpation of the left popliteal fossa without palpable abnormality. Intact pedal pulses on the left.    Neurological:      Mental Status: She is alert and oriented to person, place, and time.   Psychiatric:         Behavior: Behavior normal.        Result Review :  The following data was reviewed by: ERDDY Bailey on 10/17/2022:  Office Visit on 10/17/2022   Component Date Value Ref Range Status   • Protime 10/17/2022  13.4  12.1 - 14.7 Seconds Final   • INR 10/17/2022 1.00  0.90 - 1.10 Final   • PTT 10/17/2022 31.0  26.5 - 34.5 seconds Final   • Fibrinogen 10/17/2022 528 (H)  173 - 524 mg/dL Final         Assessment / Plan         Assessment   Diagnoses and all orders for this visit:    1. Easy bruising (Primary)  • Bruising of the LLE without clear cause at this time. Also with prior history of spontaneous subdural hematoma in 2020.  • No offending agents noted on medication list or reported OTC.  • She is overdue for routine labs and is fasting today. Will obtain her routine labs including CBC, CMP, TSH, FLP, Vit b12, and Vit D levels. Will add PT/INR, APTT, Fibrinogen, PBS, factor VIII activity, factor V Leiden, protein C and S, thrombin time, and von Willebrand panel.  • If any significant abnormalities appreciated or continued signs of bleeding, will consider hematology referral.  The patient would like to hold off on this for now.  • She was encouraged on monitoring closely for other signs of bleeding, bruising, epistaxis, bleeding gums, bleeding through her bowels, and to let us know should any of these issues arise.  - Protime-INR  - APTT   - Fibrinogen  - Peripheral Blood Smear  - Factor 5 Leiden; Future  - Von Willebrand Panel; Future  - Thrombin Time; Future  - Factor 8 Activity; Future  - Protein C & S, Functional; Future      Health Maintenance  • Declines colonoscopy. States she will never have another one.   • Annual wellness visit due next month. Encouraged on keeping her appt and she is agreeable.     Follow Up   Return for Next scheduled follow up W/ Dr. Sim next month. .    Patient was given instructions and counseling regarding her condition or for health maintenance advice. Please see specific information pulled into the AVS if appropriate.       This document has been electronically signed by REDDY Bailey   October 17, 2022 16:49 EDT    Dictated Utilizing Dragon Dictation: Part of this note may  be an electronic transcription/translation of spoken language to printed text using the Dragon Dictation System.

## 2022-10-18 LAB
BASOPHILS # BLD AUTO: 0.06 10*3/MM3 (ref 0–0.2)
BASOPHILS NFR BLD AUTO: 1 % (ref 0–1.5)
DEPRECATED RDW RBC AUTO: 48.2 FL (ref 37–54)
EOSINOPHIL # BLD AUTO: 0.2 10*3/MM3 (ref 0–0.4)
EOSINOPHIL NFR BLD AUTO: 3.2 % (ref 0.3–6.2)
ERYTHROCYTE [DISTWIDTH] IN BLOOD BY AUTOMATED COUNT: 14 % (ref 12.3–15.4)
HCT VFR BLD AUTO: 39.1 % (ref 34–46.6)
HGB BLD-MCNC: 12.4 G/DL (ref 12–15.9)
IMM GRANULOCYTES # BLD AUTO: 0.02 10*3/MM3 (ref 0–0.05)
IMM GRANULOCYTES NFR BLD AUTO: 0.3 % (ref 0–0.5)
LAB AP CASE REPORT: NORMAL
LYMPHOCYTES # BLD AUTO: 1.98 10*3/MM3 (ref 0.7–3.1)
LYMPHOCYTES NFR BLD AUTO: 31.9 % (ref 19.6–45.3)
MCH RBC QN AUTO: 29.6 PG (ref 26.6–33)
MCHC RBC AUTO-ENTMCNC: 31.7 G/DL (ref 31.5–35.7)
MCV RBC AUTO: 93.3 FL (ref 79–97)
MONOCYTES # BLD AUTO: 0.42 10*3/MM3 (ref 0.1–0.9)
MONOCYTES NFR BLD AUTO: 6.8 % (ref 5–12)
NEUTROPHILS NFR BLD AUTO: 3.52 10*3/MM3 (ref 1.7–7)
NEUTROPHILS NFR BLD AUTO: 56.8 % (ref 42.7–76)
NRBC BLD AUTO-RTO: 0 /100 WBC (ref 0–0.2)
PATH REPORT.FINAL DX SPEC: NORMAL
PATHOLOGY REVIEW: YES
PLATELET # BLD AUTO: 220 10*3/MM3 (ref 140–450)
PMV BLD AUTO: 10.8 FL (ref 6–12)
RBC # BLD AUTO: 4.19 10*6/MM3 (ref 3.77–5.28)
WBC NRBC COR # BLD: 6.2 10*3/MM3 (ref 3.4–10.8)

## 2022-10-19 ENCOUNTER — TELEPHONE (OUTPATIENT)
Dept: FAMILY MEDICINE CLINIC | Facility: CLINIC | Age: 77
End: 2022-10-19

## 2022-10-19 NOTE — TELEPHONE ENCOUNTER
----- Message from REDDY Pascual sent at 10/19/2022  8:04 AM EDT -----  Please let Ms. Hawk know that the blood tests I have back so far have been ok. The pathologist looked at her blood under a microscope and didn't see any abnormal cells. Her blood counts are in normal range. I am still waiting on some of the other tests which look for bleeding disorders to come back. We will update her when they have resulted.

## 2022-10-25 DIAGNOSIS — M15.9 GENERALIZED OSTEOARTHRITIS: ICD-10-CM

## 2022-10-25 RX ORDER — TRAMADOL HYDROCHLORIDE 50 MG/1
50 TABLET ORAL 4 TIMES DAILY
Qty: 120 TABLET | Refills: 0 | Status: SHIPPED | OUTPATIENT
Start: 2022-10-25 | End: 2022-11-19 | Stop reason: SDUPTHER

## 2022-10-25 NOTE — TELEPHONE ENCOUNTER
Dr. Sim is out of the office this week. Could you refill this?     Pt was last seen on 12/7/2021. Has a follow up scheduled on 11/18/22.  Will put HENRY on your desk.

## 2022-11-02 ENCOUNTER — TELEPHONE (OUTPATIENT)
Dept: FAMILY MEDICINE CLINIC | Facility: CLINIC | Age: 77
End: 2022-11-02

## 2022-11-02 NOTE — TELEPHONE ENCOUNTER
Relayed information from provider that lab didn't process some tests she had ordered on her blood for some reason. Patient states bruising is doing much better and going away -- informed her that she could keep appointment with Dr. Sim for later this month and see if he wants to have the labs redrawn at that time. Patient verbalized understanding.

## 2022-11-18 ENCOUNTER — OFFICE VISIT (OUTPATIENT)
Dept: FAMILY MEDICINE CLINIC | Facility: CLINIC | Age: 77
End: 2022-11-18

## 2022-11-18 DIAGNOSIS — M54.59 MECHANICAL LOW BACK PAIN: ICD-10-CM

## 2022-11-18 DIAGNOSIS — I10 ESSENTIAL HYPERTENSION: ICD-10-CM

## 2022-11-18 DIAGNOSIS — M85.89 OSTEOPENIA OF MULTIPLE SITES: ICD-10-CM

## 2022-11-18 DIAGNOSIS — K52.9 CHRONIC DIARRHEA: ICD-10-CM

## 2022-11-18 DIAGNOSIS — G43.009 MIGRAINE WITHOUT AURA AND WITHOUT STATUS MIGRAINOSUS, NOT INTRACTABLE: ICD-10-CM

## 2022-11-18 DIAGNOSIS — Z87.828 HISTORY OF TRAUMATIC SUBDURAL HEMATOMA: ICD-10-CM

## 2022-11-18 DIAGNOSIS — J30.2 CHRONIC SEASONAL ALLERGIC RHINITIS: Primary | ICD-10-CM

## 2022-11-18 DIAGNOSIS — Z00.00 HEALTHCARE MAINTENANCE: ICD-10-CM

## 2022-11-18 DIAGNOSIS — F33.41 RECURRENT MAJOR DEPRESSIVE DISORDER, IN PARTIAL REMISSION: ICD-10-CM

## 2022-11-18 DIAGNOSIS — J45.40 MODERATE PERSISTENT EXTRINSIC ASTHMA WITHOUT COMPLICATION: ICD-10-CM

## 2022-11-18 DIAGNOSIS — K21.9 GASTROESOPHAGEAL REFLUX DISEASE WITHOUT ESOPHAGITIS: ICD-10-CM

## 2022-11-18 DIAGNOSIS — M15.9 GENERALIZED OSTEOARTHRITIS: ICD-10-CM

## 2022-11-18 DIAGNOSIS — E53.8 B12 DEFICIENCY: ICD-10-CM

## 2022-11-18 DIAGNOSIS — E78.2 MIXED HYPERLIPIDEMIA: ICD-10-CM

## 2022-11-18 DIAGNOSIS — E55.9 VITAMIN D DEFICIENCY: ICD-10-CM

## 2022-11-18 PROCEDURE — G0439 PPPS, SUBSEQ VISIT: HCPCS | Performed by: GENERAL PRACTICE

## 2022-11-18 PROCEDURE — 1170F FXNL STATUS ASSESSED: CPT | Performed by: GENERAL PRACTICE

## 2022-11-18 PROCEDURE — 1160F RVW MEDS BY RX/DR IN RCRD: CPT | Performed by: GENERAL PRACTICE

## 2022-11-18 NOTE — PROGRESS NOTES
The ABCs of the Annual Wellness Visit  Subsequent Medicare Wellness Visit    You have chosen to receive care through a telehealth visit.  Do you consent to use a video/audio connection for your medical care today? Yes    I did not complete this video visit with the patient using [employer] but rather [employer].  Patient was in her house with her grandson and I was at the office.    Chief Complaint  Subsequent Medicare Wellness Visit    Subjective    History of Present Illness:  Farzana Hawk is a 77 y.o. female who presents for a Subsequent Medicare Wellness Visit.    Asthma  She gives a long history of shortness of breath on exertion with an intermittent dry cough.  She continues to deny any chest pain or hemoptysis. She remains on montelukast and inhaled fluticasone/vilanterol with good control of her symptoms. She is generally using her rescue medicine once or twice every month or two.  Suspected precipitants include upper respiratory infection and allergens.    Essential Hypertension  Home blood pressure readings: are consistently at goal. Associated signs and symptoms: dyspnea.  She continues to deny any chest pain, palpitations, orthopnea, paroxysmal nocturnal dyspnea or peripheral edema. Current antihypertensive medications includes valsartan and furosemide.     Gastroesophageal Reflux Disease  She remains heartburn free on lansoprazole.  She continues to deny any difficulty swallowing, nausea, or vomiting.    Chronic Diarrhea  She gives an approximate 3 year history of intermittent diarrhea.  This is occasionally explosive and is frequently associated with mild cramping.  There is no history of any nausea, vomiting, hematochezia, or melena and she continues to deny any fever, chills, or night sweats.  She has occasional episodes at night but the vast majority are during the day and remain unpredictable.  She has been unable to identify any exacerbating factors.  She remains uninterested in a GI  opinion    Left Subdural Hematoma  She was discharged from [facility] on 3/14/2020 after undergoing evacuation of a left subdural hematoma.  Her course in hospital was uncomplicated.  While she continues to have intermittent headaches these are no different then she has had in the past.  She continues to deny any problems with her speech, vision, strength, or sensation.      Bruising  She continues to have intermittent bruising about the forearms and calves.  She denies any bruising about the head and neck or torso and there is no history of epistaxis, bleeding gums, hematuria, hematochezia, melena, or vaginal bleeding.  Lab Results   Component Value Date    WBC 6.20 10/17/2022    HGB 12.4 10/17/2022    HCT 39.1 10/17/2022    MCV 93.3 10/17/2022     10/17/2022     Lab Results   Component Value Date    INR 1.00 10/17/2022    PROTIME 13.4 10/17/2022     Lab Results   Component Value Date    PTT 31.0 10/17/2022     The following portions of the patient's history were reviewed and   updated as appropriate: allergies, current medications, past family history, past medical history, past social history, past surgical history and problem list.    Compared to one year ago, the patient feels her physical   health is the same.    Compared to one year ago, the patient feels her mental   health is the same.    Recent Hospitalizations:  She was not admitted to the hospital during the last year.     Current Medical Providers:  Patient Care Team:  Luis Smi MD as PCP - General (Family Medicine)    Outpatient Medications Prior to Visit   Medication Sig Dispense Refill   • vitamin D (ERGOCALCIFEROL) 1.25 MG (25538 UT) capsule capsule TAKE ONE CAPSULE BY MOUTH ONCE WEEKLY 4 capsule 5   • acetaminophen-codeine (TYLENOL #3) 300-30 MG per tablet Take 1 tablet by mouth 2 (Two) Times a Day As Needed for Moderate Pain . 60 tablet 2   • albuterol sulfate  (90 Base) MCG/ACT inhaler Inhale 2 puffs Every 4 (Four) Hours As  Needed for Wheezing. 16 g 5   • benzonatate (TESSALON) 100 MG capsule Take 1 capsule by mouth 3 (Three) Times a Day As Needed for Cough. 90 capsule 5   • cyanocobalamin 1000 MCG/ML injection Inject 1 mL under the skin into the appropriate area as directed Every 30 (Thirty) Days. 1 mL IM weekly x 4, then every 2 weeks x 2, then monthly afterward 1 mL 5   • Fluticasone Furoate-Vilanterol (BREO ELLIPTA) 200-25 MCG/INH inhaler Inhale 1 puff Daily. 3 each 3   • furosemide (LASIX) 20 MG tablet Take 1 tablet by mouth Every Morning. 90 tablet 3   • lansoprazole (PREVACID) 30 MG capsule Take 1 capsule by mouth Daily. 90 capsule 3   • levocetirizine (XYZAL) 5 MG tablet Take 1 tablet by mouth Daily. 90 tablet 3   • montelukast (SINGULAIR) 10 MG tablet Take 1 tablet by mouth Daily. 90 tablet 3   • promethazine (PHENERGAN) 25 MG tablet Take 1 tablet by mouth Every 6 (Six) Hours As Needed for Nausea or Vomiting. 60 tablet 0   • topiramate (TOPAMAX) 200 MG tablet Take 1 tablet by mouth 2 (Two) Times a Day. 180 tablet 3   • traMADol (ULTRAM) 50 MG tablet Take 1 tablet by mouth 4 (Four) Times a Day. 120 tablet 0   • valsartan (DIOVAN) 80 MG tablet Take 1 tablet by mouth Every Night. 90 tablet 3   • Zoster Vac Recomb Adjuvanted (SHINGRIX) 50 MCG reconstituted suspension Inject 50 mcg into the shoulder, thigh, or buttocks See Admin Instructions. Repeat in 2-6 months 1 each 1     No facility-administered medications prior to visit.     Opioid medication/s are on active medication list.  and I have evaluated her active treatment plan and pain score trends (see table).  There were no vitals filed for this visit.  I have reviewed the chart for potential of high risk medication and harmful drug interactions in the elderly.            Aspirin is not on active medication list.  Aspirin use is not indicated based on review of current medical condition/s. Risk of harm outweighs potential benefits.  .    Patient Active Problem List   Diagnosis    • Recurrent major depressive disorder, in partial remission (HCC)   • Generalized osteoarthritis   • Mixed hyperlipidemia   • Essential hypertension   • Extrinsic asthma without complication   • Chronic seasonal allergic rhinitis   • Gastroesophageal reflux disease without esophagitis   • Osteopenia of multiple sites   • Vitamin D deficiency   • Migraine without aura and without status migrainosus, not intractable   • Healthcare maintenance   • Encounter for immunization   • History of traumatic subdural hematoma   • Mechanical low back pain   • Chronic diarrhea   • B12 deficiency   • History of COVID-19     Advance Care Planning  Advance Directive is not on file.  ACP discussion was held with the patient during this visit. Patient does not have an advance directive, information provided.    Review of Systems   Constitutional: Positive for fatigue. Negative for appetite change, chills, diaphoresis and fever.   HENT: Positive for rhinorrhea. Negative for congestion, ear pain, postnasal drip, sinus pressure, sneezing, sore throat, tinnitus and voice change.    Eyes: Negative for visual disturbance.   Respiratory: Positive for cough and shortness of breath. Negative for wheezing.    Cardiovascular: Negative for chest pain, palpitations and leg swelling.   Gastrointestinal: Positive for diarrhea. Negative for abdominal pain, blood in stool, constipation, nausea and vomiting.   Endocrine: Negative for polydipsia and polyuria.   Genitourinary: Negative for dysuria, frequency, hematuria and urgency.   Musculoskeletal: Positive for arthralgias and back pain. Negative for joint swelling and myalgias.   Skin: Negative for rash.   Neurological: Negative for weakness, numbness and confusion.   Psychiatric/Behavioral: Positive for sleep disturbance. Negative for decreased concentration, dysphoric mood and suicidal ideas. The patient is nervous/anxious.         Objective    There were no vitals filed for this visit.  Estimated  "body mass index is 27.39 kg/m² as calculated from the following:    Height as of 10/17/22: 157.5 cm (62.01\").    Weight as of 10/17/22: 67.9 kg (149 lb 12.8 oz).    BMI is >= 25 and <30. (Overweight) The following options were offered after discussion;: exercise counseling/recommendations and nutrition counseling/recommendations    Does the patient have evidence of cognitive impairment? No    Physical Exam  Constitutional:       Comments: Bright and in good spirits. No apparent distress. No pallor, jaundice, diaphoresis, or cyanosis.   Pulmonary:      Comments: Normal respiratory effort.  No cough or audible wheezing  Skin:     Coloration: Skin is not cyanotic, jaundiced or pale.   Neurological:      Mental Status: She is oriented to person, place, and time.      Cranial Nerves: Facial asymmetry present. No dysarthria.   Psychiatric:         Attention and Perception: Attention normal.         Mood and Affect: Mood normal.         Speech: Speech normal.         Behavior: Behavior normal.         Thought Content: Thought content normal.         HEALTH RISK ASSESSMENT    Smoking Status:  Social History     Tobacco Use   Smoking Status Never   Smokeless Tobacco Never     Alcohol Consumption:  Social History     Substance and Sexual Activity   Alcohol Use No     Fall Risk Screen:    [facility] Fall Risk Assessment was completed, and patient is at LOW risk for falls.Assessment completed on:11/18/2022    Depression Screening:  PHQ-2/PHQ-9 Depression Screening 11/18/2022   Retired PHQ-9 Total Score -   Retired Total Score -   Little Interest or Pleasure in Doing Things 0-->not at all   Feeling Down, Depressed or Hopeless 0-->not at all   PHQ-9: Brief Depression Severity Measure Score 0       Health Habits and Functional and Cognitive Screening:  Functional & Cognitive Status 11/18/2022   Do you have difficulty preparing food and eating? No   Do you have difficulty bathing yourself, getting dressed or grooming yourself? No   Do " you have difficulty using the toilet? No   Do you have difficulty moving around from place to place? No   Do you have trouble with steps or getting out of a bed or a chair? No   Current Diet Well Balanced Diet   Dental Exam Up to date   Eye Exam Not up to date   Exercise (times per week) 7 times per week   Current Exercises Include Walking   Current Exercise Activities Include -   Do you need help using the phone?  No   Are you deaf or do you have serious difficulty hearing?  No   Do you need help with transportation? No   Do you need help shopping? No   Do you need help preparing meals?  No   Do you need help with housework?  No   Do you need help with laundry? No   Do you need help taking your medications? No   Do you need help managing money? No   Do you ever drive or ride in a car without wearing a seat belt? No   Have you felt unusual stress, anger or loneliness in the last month? No   Who do you live with? Alone   If you need help, do you have trouble finding someone available to you? No   Have you been bothered in the last four weeks by sexual problems? No   Do you have difficulty concentrating, remembering or making decisions? No       Age-appropriate Screening Schedule:  Refer to the list below for future screening recommendations based on patient's age, sex and/or medical conditions. Orders for these recommended tests are listed in the plan section. The patient has been provided with a written plan.    Health Maintenance   Topic Date Due   • ZOSTER VACCINE (2 of 3) 09/26/2016   • DXA SCAN  02/22/2019   • LIPID PANEL  09/25/2021   • TDAP/TD VACCINES (2 - Td or Tdap) 02/28/2027   • INFLUENZA VACCINE  Completed              Assessment & Plan   CMS Preventative Services Quick Reference  Risk Factors Identified During Encounter  Chronic Pain   Depression/Dysphoria  Fall Risk-High or Moderate  Immunizations Discussed/Encouraged (specific Immunizations; Shingrix  Inactivity/Sedentary  Obesity/Overweight   The  above risks/problems have been discussed with the patient.  Follow up actions/plans if indicated are seen below in the Assessment/Plan Section.  Pertinent information has been shared with the patient in the After Visit Summary.    Diagnoses and all orders for this visit:    1. Chronic seasonal allergic rhinitis  Reminded regarding allergen avoidance.  Continue current medication  -     montelukast (SINGULAIR) 10 MG tablet; Take 1 tablet by mouth Daily.  Dispense: 90 tablet; Refill: 3  -     levocetirizine (XYZAL) 5 MG tablet; Take 1 tablet by mouth Daily.  Dispense: 90 tablet; Refill: 3    2. Mixed hyperlipidemia  Encouraged to continue to work on her diet and exercise plan.  Scheduled for updated labs  -     Comprehensive Metabolic Panel; Future  -     Lipid Panel; Future    3. Essential hypertension  As above.   Continue current medication.  -     valsartan (DIOVAN) 80 MG tablet; Take 1 tablet by mouth Every Night.  Dispense: 90 tablet; Refill: 3  -     furosemide (LASIX) 20 MG tablet; Take 1 tablet by mouth Every Morning.  Dispense: 90 tablet; Refill: 3  -     Comprehensive Metabolic Panel; Future    4. Vitamin D deficiency  Continue supplementation with monitoring.  -     Vitamin D,25-Hydroxy; Future    5. B12 deficiency  As above.  -     cyanocobalamin 1000 MCG/ML injection; Inject 1 mL under the skin into the appropriate area as directed Every 30 (Thirty) Days.  Dispense: 1 mL; Refill: 5  -     CBC & Differential; Future  -     Vitamin B12; Future    6. Gastroesophageal reflux disease without esophagitis   Symptoms are currently well controlled.  Encouraged to report if this should change.  Continue current medication  -     lansoprazole (PREVACID) 30 MG capsule; Take 1 capsule by mouth Daily.  Dispense: 90 capsule; Refill: 3  -     CBC & Differential; Future    7. Chronic diarrhea  Patient remains uninterested in pursuing anything further  Encouraged to report if any worse or if any new symptoms or  concerns.  -     CBC & Differential; Future  -     TSH; Future    8. Healthcare maintenance  Patient has received a flu shot along with an updated bivalent COVID-19 shot.  She recalls receiving Shingrix in the past and will try to obtain documentation    9. Recurrent major depressive disorder, in partial remission (HCC)  Significant situational component.   Supportive therapy.   Encouraged to report if any worse or if any new symptoms or concerns.  -     TSH; Future    10. Osteopenia of multiple sites  Encouraged to continue to pursue weight bearing activities while exercising joint protection.  Patient uninterested in an updated DEXA scan  -     TSH; Future    11. Mechanical low back pain  Reminded regarding symptomatic treatment.   Continue current medication    12. Generalized osteoarthritis  As above.  -     traMADol (ULTRAM) 50 MG tablet; Take 1 tablet by mouth 4 (Four) Times a Day.  Dispense: 120 tablet; Refill: 2  -     acetaminophen-codeine (TYLENOL #3) 300-30 MG per tablet; Take 1 tablet by mouth 2 (Two) Times a Day As Needed for Moderate Pain.  Dispense: 60 tablet; Refill: 2    13. Migraine without aura and without status migrainosus, not intractable  As above.  -     topiramate (TOPAMAX) 200 MG tablet; Take 1 tablet by mouth 2 (Two) Times a Day.  Dispense: 180 tablet; Refill: 3    14. Moderate persistent extrinsic asthma without complication  Moderate persistent asthma. The patient is not currently having an exacerbation. In general, the patient's disease is well controlled.  Encouraged to report if this should change.  Continue current medication  -     montelukast (SINGULAIR) 10 MG tablet; Take 1 tablet by mouth Daily.  Dispense: 90 tablet; Refill: 3  -     Fluticasone Furoate-Vilanterol (Breo Ellipta) 200-25 MCG/ACT inhaler; Inhale 1 puff Daily.  Dispense: 3 each; Refill: 3  -     albuterol sulfate  (90 Base) MCG/ACT inhaler; Inhale 2 puffs Every 4 (Four) Hours As Needed for Wheezing.  Dispense:  54 g; Refill: 3    15. History of traumatic subdural hematoma    Follow Up:   Return in about 17 weeks (around 3/17/2023) for Fasting labs at convenience.     An After Visit Summary and PPPS were made available to the patient.

## 2022-11-18 NOTE — PATIENT INSTRUCTIONS
Advance Directive  Advance directives are legal documents that allow you to make decisions about your health care and medical treatment in case you become unable to communicate for yourself. Advance directives let your wishes be known to family, friends, and health care providers.  Discussing and writing advance directives should happen over time rather than all at once. Advance directives can be changed and updated at any time. There are different types of advance directives, such as:  Medical power of .  Living will.  Do not resuscitate (DNR) order or do not attempt resuscitation (DNAR) order.  Health care proxy and medical power of   A health care proxy is also called a health care agent. This person is appointed to make medical decisions for you when you are unable to make decisions for yourself. Generally, people ask a trusted friend or family member to act as their proxy and represent their preferences. Make sure you have an agreement with your trusted person to act as your proxy. A proxy may have to make a medical decision on your behalf if your wishes are not known.  A medical power of , also called a durable power of  for health care, is a legal document that names your health care proxy. Depending on the laws in your state, the document may need to be:  Signed.  Notarized.  Dated.  Copied.  Witnessed.  Incorporated into your medical record.  You may also want to appoint a trusted person to manage your money in the event you are unable to do so. This is called a durable power of  for finances. It is a separate legal document from the durable power of  for health care. You may choose your health care proxy or someone different to act as your agent in money matters.  If you do not appoint a proxy, or there is a concern that the proxy is not acting in your best interest, a court may appoint a guardian to act on your behalf.  Living will  A living will is a set of  instructions that state your wishes about medical care when you cannot express them yourself. Health care providers should keep a copy of your living will in your medical record. You may want to give a copy to family members or friends. To alert caregivers in case of an emergency, you can place a card in your wallet to let them know that you have a living will and where they can find it. A living will is used if you become:  Terminally ill.  Disabled.  Unable to communicate or make decisions.  The following decisions should be included in your living will:  To use or not to use life support equipment, such as dialysis machines and breathing machines (ventilators).  Whether you want a DNR or DNAR order. This tells health care providers not to use cardiopulmonary resuscitation (CPR) if breathing or heartbeat stops.  To use or not to use tube feeding.  To be given or not to be given food and fluids.  Whether you want comfort (palliative) care when the goal becomes comfort rather than a cure.  Whether you want to donate your organs and tissues.  A living will does not give instructions for distributing your money and property if you should pass away.  DNR or DNAR  A DNR or DNAR order is a request not to have CPR in the event that your heart stops beating or you stop breathing. If a DNR or DNAR order has not been made and shared, a health care provider will try to help any patient whose heart has stopped or who has stopped breathing. If you plan to have surgery, talk with your health care provider about how your DNR or DNAR order will be followed if problems occur.  What if I do not have an advance directive?  Some states assign family decision makers to act on your behalf if you do not have an advance directive. Each state has its own laws about advance directives. You may want to check with your health care provider, , or state representative about the laws in your state.  Summary  Advance directives are legal  documents that allow you to make decisions about your health care and medical treatment in case you become unable to communicate for yourself.  The process of discussing and writing advance directives should happen over time. You can change and update advance directives at any time.  Advance directives may include a medical power of , a living will, and a DNR or DNAR order.  This information is not intended to replace advice given to you by your health care provider. Make sure you discuss any questions you have with your health care provider.  Document Revised: 09/21/2021 Document Reviewed: 09/21/2021  [employer] Patient Education © 2022 [employer]  Fall Prevention in the Home, Adult  Falls can cause injuries and can happen to people of all ages. There are many things you can do to make your home safe and to help prevent falls. Ask for help when making these changes.  What actions can I take to prevent falls?  General Instructions  Use good lighting in all rooms. Replace any light bulbs that burn out.  Turn on the lights in dark areas. Use night-lights.  Keep items that you use often in easy-to-reach places. Lower the shelves around your home if needed.  Set up your furniture so you have a clear path. Avoid moving your furniture around.  Do not have throw rugs or other things on the floor that can make you trip.  Avoid walking on wet floors.  If any of your floors are uneven, fix them.  Add color or contrast paint or tape to clearly artie and help you see:  Grab bars or handrails.  First and last steps of staircases.  Where the edge of each step is.  If you use a stepladder:  Make sure that it is fully opened. Do not climb a closed stepladder.  Make sure the sides of the stepladder are locked in place.  Ask someone to hold the stepladder while you use it.  Know where your pets are when moving through your home.  What can I do in the bathroom?     Keep the floor dry. Clean up any water on the floor right  away.  Remove soap buildup in the tub or shower.  Use nonskid mats or decals on the floor of the tub or shower.  Attach bath mats securely with double-sided, nonslip rug tape.  If you need to sit down in the shower, use a plastic, nonslip stool.  Install grab bars by the toilet and in the tub and shower. Do not use towel bars as grab bars.  What can I do in the bedroom?  Make sure that you have a light by your bed that is easy to reach.  Do not use any sheets or blankets for your bed that hang to the floor.  Have a firm chair with side arms that you can use for support when you get dressed.  What can I do in the kitchen?  Clean up any spills right away.  If you need to reach something above you, use a step stool with a grab bar.  Keep electrical cords out of the way.  Do not use floor polish or wax that makes floors slippery.  What can I do with my stairs?  Do not leave any items on the stairs.  Make sure that you have a light switch at the top and the bottom of the stairs.  Make sure that there are handrails on both sides of the stairs. Fix handrails that are broken or loose.  Install nonslip stair treads on all your stairs.  Avoid having throw rugs at the top or bottom of the stairs.  Choose a carpet that does not hide the edge of the steps on the stairs.  Check carpeting to make sure that it is firmly attached to the stairs. Fix carpet that is loose or worn.  What can I do on the outside of my home?  Use bright outdoor lighting.  Fix the edges of walkways and driveways and fix any cracks.  Remove anything that might make you trip as you walk through a door, such as a raised step or threshold.  Trim any bushes or trees on paths to your home.  Check to see if handrails are loose or broken and that both sides of all steps have handrails.  Install guardrails along the edges of any raised decks and porches.  Clear paths of anything that can make you trip, such as tools or rocks.  Have leaves, snow, or ice cleared  regularly.  Use sand or salt on paths during winter.  Clean up any spills in your garage right away. This includes grease or oil spills.  What other actions can I take?  Wear shoes that:  Have a low heel. Do not wear high heels.  Have rubber bottoms.  Feel good on your feet and fit well.  Are closed at the toe. Do not wear open-toe sandals.  Use tools that help you move around if needed. These include:  Canes.  Walkers.  Scooters.  Crutches.  Review your medicines with your doctor. Some medicines can make you feel dizzy. This can increase your chance of falling.  Ask your doctor what else you can do to help prevent falls.  Where to find more information  Centers for Disease Control and Prevention, [employer]: www.cdc.gov  [employer] [place] [employer]: www.david.nih.gov  Contact a doctor if:  You are afraid of falling at home.  You feel weak, drowsy, or dizzy at home.  You fall at home.  Summary  There are many simple things that you can do to make your home safe and to help prevent falls.  Ways to make your home safe include removing things that can make you trip and installing grab bars in the bathroom.  Ask for help when making these changes in your home.  This information is not intended to replace advice given to you by your health care provider. Make sure you discuss any questions you have with your health care provider.  Document Revised: 07/21/2021 Document Reviewed: 07/21/2021  [employer] Patient Education © 2022 [employer]  Mammogram  A mammogram is an X-ray of the breasts. This procedure can screen for and detect any changes that may indicate breast cancer. Mammograms are regularly done beginning at age 40 for women with average risk. A man may have a mammogram if he has a lump or swelling in his breast tissue.  A mammogram can also identify other changes and variations in the breast, such as:  Inflammation of the breast tissue (mastitis).  An infected area that contains a collection of pus (abscess).  A  fluid-filled sac (cyst).  Tumors that are not cancerous (benign).  Fibrocystic changes. This is when breast tissue becomes denser and can make the tissue feel rope-like or uneven under the skin.  Women at higher risk for breast cancer need earlier and more comprehensive screening for abnormal changes. Breast tomosynthesis, or three-dimensional (3D) mammography, and digital breast tomosynthesis are advanced forms of imaging that create 3D pictures of the breasts.  Tell a health care provider:  About any allergies you have.  If you have breast implants.  If you have had previous breast disease, biopsy, or surgery.  If you have a family history of breast cancer.  If you are breastfeeding.  Whether you are pregnant or may be pregnant.  What are the risks?  Generally, this is a safe procedure. However, problems may occur, including:  Exposure to radiation. Radiation levels are very low with this test.  The need for more tests.  The mammogram fails to detect certain cancers or the results are misinterpreted.  Difficulty with detecting breast cancer in women with dense breasts.  What happens before the procedure?  Schedule your test about 1-2 weeks after your menstrual period if you are menstruating. This is usually when your breasts are the least tender.  If you have had a mammogram done at a different facility in the past, get the mammogram X-rays or have them sent to your current exam facility. The new and old images will be compared.  Wash your breasts and underarms on the day of the test.  Do not wear deodorants, perfumes, lotions, or powders anywhere on your body on the day of the test.  Remove any jewelry from your neck.  Wear clothes that you can change into and out of easily.  What happens during the procedure?    You will undress from the waist up and put on a gown that opens in the front.  You will  front of the X-ray machine.  Each breast will be placed between two plastic or glass plates. The plates  will compress your breast for a few seconds. Try to stay as relaxed as possible. This procedure does not cause any harm to your breasts. Any discomfort you feel will be very brief.  X-rays will be taken from different angles of each breast.  The procedure may vary among health care providers and hospitals.  What can I expect after the procedure?  The mammogram will be examined by a specialist (radiologist).  You may need to repeat certain parts of the test, depending on the quality of the images. This is done if the radiologist needs a better view of the breast tissue.  You may resume your normal activities.  It is up to you to get the results of your procedure. Ask your health care provider, or the department that is doing the procedure, when your results will be ready.  Summary  A mammogram is an X-ray of the breasts. This procedure can screen for and detect any changes that may indicate breast cancer.  A man may have a mammogram if he has a lump or swelling in his breast tissue.  If you have had a mammogram done at a different facility in the past, get the mammogram X-rays or have them sent to your current exam facility in order to compare them.  Schedule your test about 1-2 weeks after your menstrual period if you are menstruating.  Ask when your test results will be ready. Make sure you get your test results.  This information is not intended to replace advice given to you by your health care provider. Make sure you discuss any questions you have with your health care provider.  Document Revised: 08/31/2022 Document Reviewed: 10/18/2021  [employer] Patient Education © 2022 [employer]  Colorectal Cancer Screening  Colorectal cancer screening is a group of tests that are used to check for colorectal cancer before symptoms develop. Colorectal refers to the colon and rectum. The colon and rectum are located at the end of the digestive tract and carry stool (feces) out of the body.  Who should have screening?  All  adults who are 45-75 years old should have screening. Your health care provider may recommend screening before age 45. You will have tests every 1-10 years, depending on your results and the type of screening test. Screening recommendations for adults who are 76-85 years old vary depending on a person's health. People older than age 85 should no longer get colorectal cancer screening.  You may have screening tests starting before age 45, or more often than other people, if you have any of these risk factors:  A personal or family history of colorectal cancer or abnormal growths known as polyps in your colon.  Inflammatory bowel disease, such as ulcerative colitis or Crohn's disease.  A history of having radiation treatment to the abdomen or the area between the hip bones (pelvic area) for cancer.  A type of genetic syndrome that is passed from parent to child (hereditary), such as:  Loo syndrome.  Familial adenomatous polyposis.  Turcot syndrome.  Peutz-Jeghers syndrome.  MUTYH-associated polyposis (MAP).  A personal history of diabetes.  Types of tests  There are several types of colorectal screening tests. You may have one or more of the following:  Guaiac-based fecal occult blood testing. For this test, a stool sample is checked for hidden (occult) blood, which could be a sign of colorectal cancer.  Fecal immunochemical test (FIT). For this test, a stool sample is checked for blood, which could be a sign of colorectal cancer.  Stool DNA test. For this test, a stool sample is checked for blood and changes in DNA that could lead to colorectal cancer.  Sigmoidoscopy. During this test, a thin, flexible tube with a camera on the end, called a sigmoidoscope, is used to examine the rectum and the lower colon.  Colonoscopy. During this test, a long, flexible tube with a camera on the end, called a colonoscope, is used to examine the entire colon and rectum. Also, sometimes a tissue sample is taken to be looked at under  a microscope (biopsy) or small polyps are removed during this test.  Virtual colonoscopy. Instead of a colonoscope, this type of colonoscopy uses a CT scan to take pictures of the colon and rectum. A CT scan is a type of X-ray that is made using computers.  What are the benefits of screening?  Screening reduces your risk for colorectal cancer and can help identify cancer at an early stage, when the cancer can be removed or treated more easily. It is common for polyps to form in the lining of the colon, especially as you age. These polyps may be cancerous or become cancerous over time. Screening can identify these polyps.  What are the risks of screening?  Generally, these are safe tests. However, problems may occur, including:  The need for more tests to confirm results from a stool sample test. Stool sample tests have fewer risks than other types of screening tests.  Being exposed to low levels of radiation, if you had a test involving X-rays. This may slightly increase your cancer risk. The benefit of detecting cancer outweighs the slight increase in risk.  Bleeding, damage to the intestine, or infection caused by a sigmoidoscopy or colonoscopy.  A reaction to medicines given during a sigmoidoscopy or colonoscopy.  Talk with your health care provider to understand your risk for colorectal cancer and to make a screening plan that is right for you.  Questions to ask your health care provider  When should I start colorectal cancer screening?  What is my risk for colorectal cancer?  How often do I need screening?  Which screening tests do I need?  How do I get my test results?  What do my results mean?  Where to find more information  Learn more about colorectal cancer screening from:  The American Cancer Society: cancer.org  National Cancer [place]: cancer.gov  Summary  Colorectal cancer screening is a group of tests used to check for colorectal cancer before symptoms develop.  All adults who are 45-75 years old  should have screening. Your health care provider may recommend screening before age 45.  You may have screening tests starting before age 45, or more often than other people, if you have certain risk factors.  Screening reduces your risk for colorectal cancer and can help identify cancer at an early stage, when the cancer can be removed or treated more easily.  Talk with your health care provider to understand your risk for colorectal cancer and to make a screening plan that is right for you.  This information is not intended to replace advice given to you by your health care provider. Make sure you discuss any questions you have with your health care provider.  Document Revised: 04/07/2021 Document Reviewed: 04/07/2021  Elsevier Patient Education © 2022 [employer]

## 2022-11-19 PROBLEM — Z12.31 ENCOUNTER FOR SCREENING MAMMOGRAM FOR BREAST CANCER: Status: RESOLVED | Noted: 2017-02-07 | Resolved: 2022-11-19

## 2022-11-19 PROBLEM — Z87.828 HISTORY OF TRAUMATIC SUBDURAL HEMATOMA: Status: ACTIVE | Noted: 2020-03-19

## 2022-11-19 RX ORDER — FLUTICASONE FUROATE AND VILANTEROL 200; 25 UG/1; UG/1
1 POWDER RESPIRATORY (INHALATION)
Qty: 3 EACH | Refills: 3 | Status: SHIPPED | OUTPATIENT
Start: 2022-11-19

## 2022-11-19 RX ORDER — LANSOPRAZOLE 30 MG/1
30 CAPSULE, DELAYED RELEASE ORAL DAILY
Qty: 90 CAPSULE | Refills: 3 | Status: SHIPPED | OUTPATIENT
Start: 2022-11-19

## 2022-11-19 RX ORDER — LEVOCETIRIZINE DIHYDROCHLORIDE 5 MG/1
5 TABLET, FILM COATED ORAL DAILY
Qty: 90 TABLET | Refills: 3 | Status: SHIPPED | OUTPATIENT
Start: 2022-11-19

## 2022-11-19 RX ORDER — CYANOCOBALAMIN 1000 UG/ML
1000 INJECTION, SOLUTION INTRAMUSCULAR; SUBCUTANEOUS
Qty: 1 ML | Refills: 5 | Status: SHIPPED | OUTPATIENT
Start: 2022-11-19

## 2022-11-19 RX ORDER — MONTELUKAST SODIUM 10 MG/1
10 TABLET ORAL DAILY
Qty: 90 TABLET | Refills: 3 | Status: SHIPPED | OUTPATIENT
Start: 2022-11-19

## 2022-11-19 RX ORDER — VALSARTAN 80 MG/1
80 TABLET ORAL NIGHTLY
Qty: 90 TABLET | Refills: 3 | Status: SHIPPED | OUTPATIENT
Start: 2022-11-19

## 2022-11-19 RX ORDER — ALBUTEROL SULFATE 90 UG/1
2 AEROSOL, METERED RESPIRATORY (INHALATION) EVERY 4 HOURS PRN
Qty: 54 G | Refills: 3 | Status: SHIPPED | OUTPATIENT
Start: 2022-11-19

## 2022-11-19 RX ORDER — TRAMADOL HYDROCHLORIDE 50 MG/1
50 TABLET ORAL 4 TIMES DAILY
Qty: 120 TABLET | Refills: 2 | Status: SHIPPED | OUTPATIENT
Start: 2022-11-19 | End: 2023-03-09

## 2022-11-19 RX ORDER — FUROSEMIDE 20 MG/1
20 TABLET ORAL EVERY MORNING
Qty: 90 TABLET | Refills: 3 | Status: SHIPPED | OUTPATIENT
Start: 2022-11-19

## 2022-11-19 RX ORDER — ACETAMINOPHEN AND CODEINE PHOSPHATE 300; 30 MG/1; MG/1
1 TABLET ORAL 2 TIMES DAILY PRN
Qty: 60 TABLET | Refills: 2 | Status: SHIPPED | OUTPATIENT
Start: 2022-11-19

## 2023-02-17 ENCOUNTER — OFFICE VISIT (OUTPATIENT)
Dept: FAMILY MEDICINE CLINIC | Facility: CLINIC | Age: 78
End: 2023-02-17
Payer: MEDICARE

## 2023-02-17 VITALS
BODY MASS INDEX: 27.6 KG/M2 | WEIGHT: 150 LBS | HEART RATE: 94 BPM | SYSTOLIC BLOOD PRESSURE: 132 MMHG | OXYGEN SATURATION: 99 % | TEMPERATURE: 98.3 F | HEIGHT: 62 IN | DIASTOLIC BLOOD PRESSURE: 86 MMHG

## 2023-02-17 DIAGNOSIS — R05.9 COUGH, UNSPECIFIED TYPE: Chronic | ICD-10-CM

## 2023-02-17 DIAGNOSIS — E55.9 VITAMIN D DEFICIENCY: Chronic | ICD-10-CM

## 2023-02-17 DIAGNOSIS — J01.00 ACUTE NON-RECURRENT MAXILLARY SINUSITIS: Primary | ICD-10-CM

## 2023-02-17 DIAGNOSIS — I10 ESSENTIAL HYPERTENSION: Chronic | ICD-10-CM

## 2023-02-17 LAB
EXPIRATION DATE: NORMAL
FLUAV AG NPH QL: NEGATIVE
FLUBV AG NPH QL: NEGATIVE
INTERNAL CONTROL: NORMAL
Lab: NORMAL

## 2023-02-17 PROCEDURE — 87804 INFLUENZA ASSAY W/OPTIC: CPT | Performed by: PHYSICIAN ASSISTANT

## 2023-02-17 PROCEDURE — 99214 OFFICE O/P EST MOD 30 MIN: CPT | Performed by: PHYSICIAN ASSISTANT

## 2023-02-17 PROCEDURE — 96372 THER/PROPH/DIAG INJ SC/IM: CPT | Performed by: PHYSICIAN ASSISTANT

## 2023-02-17 RX ORDER — AZITHROMYCIN 250 MG/1
TABLET, FILM COATED ORAL
Qty: 6 TABLET | Refills: 0 | Status: SHIPPED | OUTPATIENT
Start: 2023-02-17

## 2023-02-17 RX ORDER — DEXAMETHASONE SODIUM PHOSPHATE 4 MG/ML
8 INJECTION, SOLUTION INTRA-ARTICULAR; INTRALESIONAL; INTRAMUSCULAR; INTRAVENOUS; SOFT TISSUE ONCE
Status: COMPLETED | OUTPATIENT
Start: 2023-02-17 | End: 2023-02-17

## 2023-02-17 RX ADMIN — DEXAMETHASONE SODIUM PHOSPHATE 8 MG: 4 INJECTION, SOLUTION INTRA-ARTICULAR; INTRALESIONAL; INTRAMUSCULAR; INTRAVENOUS; SOFT TISSUE at 11:38

## 2023-02-17 NOTE — PROGRESS NOTES
"Subjective   Farzana Hawk is a 77 y.o. female.       Chief Complaint -cough    History of Present Illness -    ROS    Cough-  Patient complains of cough from postnasal drainage runny nose headache and weakness with associated dizziness that began 5 days ago.  Symptoms have progressively worsened with productive cough that began yesterday.  Minimal relief with albuterol HFA, Xyzal and Singulair.    Today at home COVID testing negative  Influenza testing in office today negative    Hypertension-  Controlled with furosemide and valsartan.  We discussed may be slightly elevated today due to acute illness    Vitamin D deficiency-stable with vitamin D supplementation    The following portions of the patient's history were reviewed and updated as appropriate: allergies, current medications, past family history, past medical history, past social history, past surgical history and problem list.    Review of Systems    Objective  Vital signs:  /86   Pulse 94   Temp 98.3 °F (36.8 °C) (Temporal)   Ht 157.5 cm (62\")   Wt 68 kg (150 lb)   SpO2 99%   BMI 27.44 kg/m²     Physical Exam  Vitals and nursing note reviewed.   Constitutional:       General: She is not in acute distress.     Appearance: Normal appearance. She is well-developed. She is not diaphoretic.   HENT:      Head: Normocephalic and atraumatic.      Right Ear: Ear canal and external ear normal.      Left Ear: Ear canal and external ear normal.      Ears:      Comments: Cloudy white fluid noted behind TMs bilaterally     Nose: Rhinorrhea present.      Mouth/Throat:      Mouth: Mucous membranes are moist.      Pharynx: Posterior oropharyngeal erythema present. No oropharyngeal exudate.      Comments: Postnasal drainage noted.  Tenderness noted over bilateral maxillary sinus areas  Neck:      Thyroid: No thyromegaly.   Cardiovascular:      Rate and Rhythm: Normal rate and regular rhythm.      Heart sounds: Normal heart sounds. No murmur " heard.  Pulmonary:      Effort: Pulmonary effort is normal.      Breath sounds: Normal breath sounds. No wheezing or rales.   Musculoskeletal:      Cervical back: Normal range of motion and neck supple.   Lymphadenopathy:      Cervical: Cervical adenopathy present.   Skin:     General: Skin is warm and dry.      Findings: No rash.   Neurological:      Mental Status: She is alert and oriented to person, place, and time.   Psychiatric:         Mood and Affect: Mood normal.         Behavior: Behavior normal.         Thought Content: Thought content normal.         The following data was reviewed by: REDDY Tomlinson on 02/17/2023:      CBC w/diff    CBC w/Diff 10/17/22   WBC 6.20   RBC 4.19   Hemoglobin 12.4   Hematocrit 39.1   MCV 93.3   MCH 29.6   MCHC 31.7   RDW 14.0   Platelets 220   Neutrophil Rel % 56.8   Immature Granulocyte Rel % 0.3   Lymphocyte Rel % 31.9   Monocyte Rel % 6.8   Eosinophil Rel % 3.2   Basophil Rel % 1.0                          Assessment & Plan     Diagnoses and all orders for this visit:    1. Acute non-recurrent maxillary sinusitis (Primary)  Comments:  Start azithromycin  Advised Flonase which patient states she has at home  RTC if symptoms do not improve within 1 week  Orders:  -     azithromycin (Zithromax Z-Donnie) 250 MG tablet; Take 2 tablets by mouth on day 1, then 1 tablet daily on days 2-5  Dispense: 6 tablet; Refill: 0  -     dexamethasone (DECADRON) injection 8 mg    2. Cough, unspecified type  Comments:  Likely secondary to drainage  Advised conservative measures including hot tea  Orders:  -     POCT Influenza A/B    3. Essential hypertension  Comments:  Continue valsartan and furosemide  Advise daily BP and pulse monitoring and report any consistent readings greater than 140/90    4. Vitamin D deficiency  Comments:  Continue vitamin D supplementation            Patient was given instructions and counseling regarding his condition or for health maintenance advice. Please see  specific information pulled into the AVS if appropriate      This document has been electronically signed by:  Zaira Ward PA-C

## 2023-03-09 DIAGNOSIS — M15.9 GENERALIZED OSTEOARTHRITIS: ICD-10-CM

## 2023-03-09 RX ORDER — TRAMADOL HYDROCHLORIDE 50 MG/1
TABLET ORAL
Qty: 120 TABLET | Refills: 0 | Status: SHIPPED | OUTPATIENT
Start: 2023-03-09

## 2023-03-14 DIAGNOSIS — Z12.11 ENCOUNTER FOR SCREENING FOR MALIGNANT NEOPLASM OF COLON: Primary | ICD-10-CM

## 2023-03-14 DIAGNOSIS — Z00.00 HEALTHCARE MAINTENANCE: ICD-10-CM

## 2023-04-04 ENCOUNTER — LAB (OUTPATIENT)
Dept: FAMILY MEDICINE CLINIC | Facility: CLINIC | Age: 78
End: 2023-04-04
Payer: MEDICARE

## 2023-04-04 DIAGNOSIS — K21.9 GASTROESOPHAGEAL REFLUX DISEASE WITHOUT ESOPHAGITIS: ICD-10-CM

## 2023-04-04 DIAGNOSIS — F33.41 RECURRENT MAJOR DEPRESSIVE DISORDER, IN PARTIAL REMISSION: ICD-10-CM

## 2023-04-04 DIAGNOSIS — E53.8 B12 DEFICIENCY: ICD-10-CM

## 2023-04-04 DIAGNOSIS — E78.2 MIXED HYPERLIPIDEMIA: ICD-10-CM

## 2023-04-04 DIAGNOSIS — I10 ESSENTIAL HYPERTENSION: ICD-10-CM

## 2023-04-04 DIAGNOSIS — M85.89 OSTEOPENIA OF MULTIPLE SITES: ICD-10-CM

## 2023-04-04 DIAGNOSIS — E55.9 VITAMIN D DEFICIENCY: ICD-10-CM

## 2023-04-04 DIAGNOSIS — K52.9 CHRONIC DIARRHEA: ICD-10-CM

## 2023-04-04 LAB
25(OH)D3 SERPL-MCNC: 20.6 NG/ML (ref 30–100)
ALBUMIN SERPL-MCNC: 4 G/DL (ref 3.5–5.2)
ALBUMIN/GLOB SERPL: 1.6 G/DL
ALP SERPL-CCNC: 74 U/L (ref 39–117)
ALT SERPL W P-5'-P-CCNC: 9 U/L (ref 1–33)
ANION GAP SERPL CALCULATED.3IONS-SCNC: 9.3 MMOL/L (ref 5–15)
AST SERPL-CCNC: 11 U/L (ref 1–32)
BASOPHILS # BLD AUTO: 0.08 10*3/MM3 (ref 0–0.2)
BASOPHILS NFR BLD AUTO: 1.6 % (ref 0–1.5)
BILIRUB SERPL-MCNC: 0.8 MG/DL (ref 0–1.2)
BUN SERPL-MCNC: 14 MG/DL (ref 8–23)
BUN/CREAT SERPL: 13.5 (ref 7–25)
CALCIUM SPEC-SCNC: 9.9 MG/DL (ref 8.6–10.5)
CHLORIDE SERPL-SCNC: 111 MMOL/L (ref 98–107)
CHOLEST SERPL-MCNC: 216 MG/DL (ref 0–200)
CO2 SERPL-SCNC: 23.7 MMOL/L (ref 22–29)
CREAT SERPL-MCNC: 1.04 MG/DL (ref 0.57–1)
DEPRECATED RDW RBC AUTO: 44.1 FL (ref 37–54)
EGFRCR SERPLBLD CKD-EPI 2021: 55.5 ML/MIN/1.73
EOSINOPHIL # BLD AUTO: 0.3 10*3/MM3 (ref 0–0.4)
EOSINOPHIL NFR BLD AUTO: 5.9 % (ref 0.3–6.2)
ERYTHROCYTE [DISTWIDTH] IN BLOOD BY AUTOMATED COUNT: 13.5 % (ref 12.3–15.4)
GLOBULIN UR ELPH-MCNC: 2.5 GM/DL
GLUCOSE SERPL-MCNC: 89 MG/DL (ref 65–99)
HCT VFR BLD AUTO: 38 % (ref 34–46.6)
HDLC SERPL-MCNC: 56 MG/DL (ref 40–60)
HGB BLD-MCNC: 12.5 G/DL (ref 12–15.9)
IMM GRANULOCYTES # BLD AUTO: 0.01 10*3/MM3 (ref 0–0.05)
IMM GRANULOCYTES NFR BLD AUTO: 0.2 % (ref 0–0.5)
LDLC SERPL CALC-MCNC: 140 MG/DL (ref 0–100)
LDLC/HDLC SERPL: 2.46 {RATIO}
LYMPHOCYTES # BLD AUTO: 1.75 10*3/MM3 (ref 0.7–3.1)
LYMPHOCYTES NFR BLD AUTO: 34.4 % (ref 19.6–45.3)
MCH RBC QN AUTO: 29.6 PG (ref 26.6–33)
MCHC RBC AUTO-ENTMCNC: 32.9 G/DL (ref 31.5–35.7)
MCV RBC AUTO: 90 FL (ref 79–97)
MONOCYTES # BLD AUTO: 0.31 10*3/MM3 (ref 0.1–0.9)
MONOCYTES NFR BLD AUTO: 6.1 % (ref 5–12)
NEUTROPHILS NFR BLD AUTO: 2.64 10*3/MM3 (ref 1.7–7)
NEUTROPHILS NFR BLD AUTO: 51.8 % (ref 42.7–76)
NRBC BLD AUTO-RTO: 0 /100 WBC (ref 0–0.2)
PLATELET # BLD AUTO: 234 10*3/MM3 (ref 140–450)
PMV BLD AUTO: 10.3 FL (ref 6–12)
POTASSIUM SERPL-SCNC: 3.8 MMOL/L (ref 3.5–5.2)
PROT SERPL-MCNC: 6.5 G/DL (ref 6–8.5)
RBC # BLD AUTO: 4.22 10*6/MM3 (ref 3.77–5.28)
SODIUM SERPL-SCNC: 144 MMOL/L (ref 136–145)
TRIGL SERPL-MCNC: 111 MG/DL (ref 0–150)
TSH SERPL DL<=0.05 MIU/L-ACNC: 3.89 UIU/ML (ref 0.27–4.2)
VIT B12 BLD-MCNC: 885 PG/ML (ref 211–946)
VLDLC SERPL-MCNC: 20 MG/DL (ref 5–40)
WBC NRBC COR # BLD: 5.09 10*3/MM3 (ref 3.4–10.8)

## 2023-04-04 PROCEDURE — 82306 VITAMIN D 25 HYDROXY: CPT | Performed by: GENERAL PRACTICE

## 2023-04-04 PROCEDURE — 80061 LIPID PANEL: CPT | Performed by: GENERAL PRACTICE

## 2023-04-04 PROCEDURE — 84443 ASSAY THYROID STIM HORMONE: CPT | Performed by: GENERAL PRACTICE

## 2023-04-04 PROCEDURE — 82607 VITAMIN B-12: CPT | Performed by: GENERAL PRACTICE

## 2023-04-04 PROCEDURE — 80053 COMPREHEN METABOLIC PANEL: CPT | Performed by: GENERAL PRACTICE

## 2023-04-04 PROCEDURE — 85025 COMPLETE CBC W/AUTO DIFF WBC: CPT | Performed by: GENERAL PRACTICE

## 2023-04-10 ENCOUNTER — OFFICE VISIT (OUTPATIENT)
Dept: FAMILY MEDICINE CLINIC | Facility: CLINIC | Age: 78
End: 2023-04-10
Payer: MEDICARE

## 2023-04-10 VITALS
OXYGEN SATURATION: 98 % | HEART RATE: 86 BPM | TEMPERATURE: 98.6 F | WEIGHT: 150 LBS | HEIGHT: 62 IN | BODY MASS INDEX: 27.6 KG/M2

## 2023-04-10 DIAGNOSIS — I10 ESSENTIAL HYPERTENSION: ICD-10-CM

## 2023-04-10 DIAGNOSIS — E78.2 MIXED HYPERLIPIDEMIA: ICD-10-CM

## 2023-04-10 DIAGNOSIS — E55.9 VITAMIN D DEFICIENCY: ICD-10-CM

## 2023-04-10 DIAGNOSIS — E53.8 B12 DEFICIENCY: ICD-10-CM

## 2023-04-10 DIAGNOSIS — J30.2 CHRONIC SEASONAL ALLERGIC RHINITIS: Primary | ICD-10-CM

## 2023-04-10 DIAGNOSIS — G43.009 MIGRAINE WITHOUT AURA AND WITHOUT STATUS MIGRAINOSUS, NOT INTRACTABLE: ICD-10-CM

## 2023-04-10 DIAGNOSIS — J45.40 MODERATE PERSISTENT EXTRINSIC ASTHMA WITHOUT COMPLICATION: ICD-10-CM

## 2023-04-10 DIAGNOSIS — K52.9 CHRONIC DIARRHEA: ICD-10-CM

## 2023-04-10 DIAGNOSIS — M85.89 OSTEOPENIA OF MULTIPLE SITES: ICD-10-CM

## 2023-04-10 DIAGNOSIS — Z00.00 HEALTHCARE MAINTENANCE: ICD-10-CM

## 2023-04-10 DIAGNOSIS — F33.41 RECURRENT MAJOR DEPRESSIVE DISORDER, IN PARTIAL REMISSION: ICD-10-CM

## 2023-04-10 DIAGNOSIS — K21.9 GASTROESOPHAGEAL REFLUX DISEASE WITHOUT ESOPHAGITIS: ICD-10-CM

## 2023-04-10 DIAGNOSIS — M15.9 GENERALIZED OSTEOARTHRITIS: ICD-10-CM

## 2023-04-10 PROBLEM — Z86.16 HISTORY OF COVID-19: Status: RESOLVED | Noted: 2021-09-16 | Resolved: 2023-04-10

## 2023-04-10 RX ORDER — MELATONIN
2000 DAILY
Qty: 60 TABLET | Refills: 5
Start: 2023-04-10

## 2023-04-10 RX ORDER — ACETAMINOPHEN AND CODEINE PHOSPHATE 300; 30 MG/1; MG/1
1 TABLET ORAL 2 TIMES DAILY PRN
Qty: 60 TABLET | Refills: 3 | Status: SHIPPED | OUTPATIENT
Start: 2023-04-10

## 2023-04-10 RX ORDER — BENZONATATE 200 MG/1
200 CAPSULE ORAL 3 TIMES DAILY PRN
Qty: 60 CAPSULE | Refills: 2 | Status: SHIPPED | OUTPATIENT
Start: 2023-04-10

## 2023-04-10 RX ORDER — TRAMADOL HYDROCHLORIDE 50 MG/1
50 TABLET ORAL 4 TIMES DAILY
Qty: 120 TABLET | Refills: 3 | Status: SHIPPED | OUTPATIENT
Start: 2023-04-10

## 2023-04-10 RX ORDER — LANOLIN ALCOHOL/MO/W.PET/CERES
1000 CREAM (GRAM) TOPICAL DAILY
Qty: 30 TABLET | Refills: 5
Start: 2023-04-10

## 2023-04-10 NOTE — PROGRESS NOTES
Subjective   Farzana Hawk is a 77 y.o. female.     Chief Complaint  She returns for a scheduled reassessment of multiple medical problems including asthma, essential hypertension, gastroesophageal reflux disease, and chronic diarrhea    History of Present Illness     Asthma  She gives a long history of shortness of breath on exertion with an intermittent dry cough.  She continues to deny any chest pain or hemoptysis. She remains on montelukast and inhaled fluticasone/vilanterol with good control of her symptoms. She is generally using her rescue medicine once or twice every month or two.  She has also found benzonatate helpful.  Suspected precipitants include upper respiratory infection and allergens.    Essential Hypertension  Home blood pressure readings: are consistently at goal. Associated signs and symptoms: dyspnea.  She continues to deny any chest pain, palpitations, orthopnea, paroxysmal nocturnal dyspnea or peripheral edema. Current antihypertensive medications includes valsartan and furosemide.   Lab Results   Component Value Date    GLUCOSE 89 04/04/2023    BUN 14 04/04/2023    CREATININE 1.04 (H) 04/04/2023    EGFR 55.5 (L) 04/04/2023    BCR 13.5 04/04/2023    K 3.8 04/04/2023    CO2 23.7 04/04/2023    CALCIUM 9.9 04/04/2023    ALBUMIN 4.0 04/04/2023    BILITOT 0.8 04/04/2023    AST 11 04/04/2023    ALT 9 04/04/2023     Lab Results   Component Value Date    ALKPHOS 74 04/04/2023     Gastroesophageal Reflux Disease  She remains heartburn free on lansoprazole.  She continues to deny any difficulty swallowing, nausea, or vomiting.  Lab Results   Component Value Date    WBC 5.09 04/04/2023    HGB 12.5 04/04/2023    HCT 38.0 04/04/2023    MCV 90.0 04/04/2023     04/04/2023     Chronic Diarrhea  She gives a 3-4 year history of intermittent diarrhea.  This is occasionally explosive and is frequently associated with mild cramping.  There is no history of any nausea, vomiting, hematochezia, or melena  and she continues to deny any fever, chills, or night sweats.  She has occasional episodes at night but the vast majority are during the day and remain unpredictable.  She has been unable to identify any exacerbating factors.  She remains uninterested in a GI opinion    Left Subdural Hematoma  She was [facility] from [facility] on [facility]/14/2020 after undergoing evacuation of a left subdural hematoma.  Her course in hospital was uncomplicated.  While she continues to have intermittent headaches these are no different then she has had in the past.  She continues to deny any problems with her speech, vision, strength, or sensation.      Labs  Most recent vitamin D 20.6 with a B12 of 885.  She is taking 1000 mg daily of the latter but is currently on no vitamin D  Lab Results   Component Value Date    CHOL 216 (H) 04/04/2023    TRIG 111 04/04/2023    HDL 56 04/04/2023     (H) 04/04/2023     Lab Results   Component Value Date    TSH 3.890 04/04/2023     The following portions of the patient's history were reviewed and updated as appropriate: allergies, current medications, past medical history, past social history and problem list.    Review of Systems   Constitutional: Positive for fatigue. Negative for appetite change, chills, fever and unexpected weight change.   HENT: Positive for rhinorrhea. Negative for congestion, ear pain, sneezing and sore throat.    Eyes: Negative for visual disturbance.   Respiratory: Positive for cough and shortness of breath (with exertion). Negative for wheezing.    Cardiovascular: Negative for chest pain, palpitations and leg swelling.   Gastrointestinal: Positive for diarrhea. Negative for abdominal pain, blood in stool, constipation, nausea and vomiting.   Genitourinary: Negative for dysuria, frequency, hematuria and urgency.   Musculoskeletal: Positive for arthralgias and back pain. Negative for joint swelling and myalgias.   Skin: Negative for rash.   Neurological: Positive for headaches.  Negative for weakness and numbness.   Psychiatric/Behavioral: Positive for sleep disturbance. Negative for dysphoric mood and suicidal ideas. The patient is nervous/anxious.      Objective   Physical Exam  Constitutional:       General: She is not in acute distress.     Appearance: Normal appearance. She is well-developed. She is not diaphoretic.      Comments: Bright and in good spirits. No apparent distress. No pallor, jaundice, diaphoresis, or cyanosis.   HENT:      Head: Atraumatic.      Right Ear: Tympanic membrane, ear canal and external ear normal.      Left Ear: Tympanic membrane, ear canal and external ear normal.      Mouth/Throat:      Lips: No lesions.      Mouth: Mucous membranes are moist. No oral lesions.      Pharynx: No oropharyngeal exudate or posterior oropharyngeal erythema.   Eyes:      General: Lids are normal.      Extraocular Movements: Extraocular movements intact.      Conjunctiva/sclera: Conjunctivae normal.      Pupils: Pupils are equal.   Neck:      Thyroid: No thyroid mass or thyromegaly.      Vascular: No carotid bruit.      Trachea: Trachea normal. No tracheal deviation.   Cardiovascular:      Rate and Rhythm: Normal rate and regular rhythm.      Heart sounds: Normal heart sounds, S1 normal and S2 normal. No murmur heard.    No gallop.   Pulmonary:      Effort: Pulmonary effort is normal.      Breath sounds: Normal breath sounds.   Abdominal:      General: Bowel sounds are normal. There is no distension or abdominal bruit.      Palpations: Abdomen is soft. There is no hepatomegaly, splenomegaly or mass.      Tenderness: There is no abdominal tenderness.      Hernia: No hernia is present.   Musculoskeletal:      Right lower leg: No edema.      Left lower leg: No edema.   Lymphadenopathy:      Head:      Right side of head: No submental, submandibular, tonsillar, preauricular, posterior auricular or occipital adenopathy.      Left side of head: No submental, submandibular, tonsillar,  preauricular, posterior auricular or occipital adenopathy.      Cervical: No cervical adenopathy.      Upper Body:      Right upper body: No supraclavicular adenopathy.      Left upper body: No supraclavicular adenopathy.   Skin:     General: Skin is warm.      Coloration: Skin is not cyanotic, jaundiced or pale.      Findings: No rash.      Nails: There is no clubbing.   Neurological:      Mental Status: She is alert and oriented to person, place, and time.      Cranial Nerves: No dysarthria or facial asymmetry.      Sensory: No sensory deficit.      Motor: No tremor.      Coordination: Coordination normal.      Gait: Gait normal.   Psychiatric:         Attention and Perception: Attention normal.         Mood and Affect: Mood normal.         Speech: Speech normal.         Behavior: Behavior normal.         Thought Content: Thought content normal.       Assessment & Plan   Problems Addressed this Visit        Allergies and Adverse Reactions    Chronic seasonal allergic rhinitis -  Continue current medication  Encouraged to report if any worse or if any new symptoms or concerns.       Cardiac and Vasculature    Essential hypertension   Hypertension: at goal. Evidence of target organ damage: none.  Encouraged to continue to work on diet and exercise plan.   Continue current medication    Mixed hyperlipidemia  As above.   Continue current medication.       Endocrine and Metabolic    B12 deficiency  Continue supplementation with monitoring.    Vitamin D deficiency  Recommended 2000 IU daily       Gastrointestinal Abdominal     Chronic diarrhea  Patient remains uninterested in a GI work-up    Gastroesophageal reflux disease without esophagitis   Symptoms are currently well controlled.  Encouraged to report if this should change.  Continue current medication       Health Encounters    Healthcare maintenance  Patient remains uninterested in any cancer screening  Reminded that she is due for [employer]     Recurrent major depressive disorder, in partial remission  Significant situational component.   Supportive therapy.   Encouraged to report if any worse or if any new symptoms or concerns.       Musculoskeletal and Injuries    Generalized osteoarthritis  Reminded regarding symptomatic treatment.   Continue current medication    Relevant Medications    traMADol (ULTRAM) 50 MG tablet    acetaminophen-codeine (TYLENOL #3) 300-30 MG per tablet    Osteopenia of multiple sites  Encouraged to continue to pursue weight bearing activities while exercising joint protection.  Patient agrees to an updated DEXA scan    Relevant Orders    DEXA Bone Density Axial       Neuro    Migraine without aura and without status migrainosus, not intractable  Chronic.  Stable.  Given samples of Ubrelvy 100 daily as needed for her next several headaches    Relevant Medications    traMADol (ULTRAM) 50 MG tablet    acetaminophen-codeine (TYLENOL #3) 300-30 MG per tablet       Pulmonary and Pneumonias    Extrinsic asthma without complication  Moderate persistent asthma. The patient is not currently having an exacerbation. In general, the patient's disease is well controlled.  Encouraged to report if this should change.  Continue current medication    Relevant Medications    benzonatate (TESSALON) 200 MG capsule   Diagnoses       Codes Comments    Chronic seasonal allergic rhinitis    -  Primary ICD-10-CM: J30.2  ICD-9-CM: 477.8     Mixed hyperlipidemia     ICD-10-CM: E78.2  ICD-9-CM: 272.2     Essential hypertension     ICD-10-CM: I10  ICD-9-CM: 401.9     Vitamin D deficiency     ICD-10-CM: E55.9  ICD-9-CM: 268.9     B12 deficiency     ICD-10-CM: E53.8  ICD-9-CM: 266.2     Gastroesophageal reflux disease without esophagitis     ICD-10-CM: K21.9  ICD-9-CM: 530.81     Chronic diarrhea     ICD-10-CM: K52.9  ICD-9-CM: 787.91     Healthcare maintenance     ICD-10-CM: Z00.00  ICD-9-CM: V70.0     Recurrent major depressive disorder, in partial remission      ICD-10-CM: F33.41  ICD-9-CM: 296.35     Generalized osteoarthritis     ICD-10-CM: M15.9  ICD-9-CM: 715.00     Osteopenia of multiple sites     ICD-10-CM: M85.89  ICD-9-CM: 733.90     Migraine without aura and without status migrainosus, not intractable     ICD-10-CM: G43.009  ICD-9-CM: 346.10     Moderate persistent extrinsic asthma without complication     ICD-10-CM: J45.40  ICD-9-CM: 493.00         I spent 40 minutes caring for Farzana Hawk on this date of service. This time includes time spent by me in the following activities:reviewing tests, performing a medically appropriate examination and/or evaluation , counseling and educating the patient/family/caregiver, ordering medications, tests, or procedures and documenting information in the medical record

## 2023-05-19 ENCOUNTER — HOSPITAL ENCOUNTER (OUTPATIENT)
Dept: BONE DENSITY | Facility: HOSPITAL | Age: 78
Discharge: HOME | End: 2023-05-19
Payer: MEDICARE

## 2023-05-19 DIAGNOSIS — M85.89 OSTEOPENIA OF MULTIPLE SITES: ICD-10-CM

## 2023-05-19 PROCEDURE — 77080 DXA BONE DENSITY AXIAL: CPT | Performed by: RADIOLOGY

## 2023-05-19 PROCEDURE — 77080 DXA BONE DENSITY AXIAL: CPT

## 2023-10-02 ENCOUNTER — OFFICE VISIT (OUTPATIENT)
Dept: FAMILY MEDICINE CLINIC | Facility: CLINIC | Age: 78
End: 2023-10-02
Payer: MEDICARE

## 2023-10-02 VITALS
BODY MASS INDEX: 25.95 KG/M2 | HEIGHT: 62 IN | WEIGHT: 141 LBS | HEART RATE: 71 BPM | TEMPERATURE: 97 F | OXYGEN SATURATION: 95 %

## 2023-10-02 DIAGNOSIS — I10 ESSENTIAL HYPERTENSION: Chronic | ICD-10-CM

## 2023-10-02 DIAGNOSIS — K21.9 GASTROESOPHAGEAL REFLUX DISEASE WITHOUT ESOPHAGITIS: Chronic | ICD-10-CM

## 2023-10-02 DIAGNOSIS — J45.40 MODERATE PERSISTENT EXTRINSIC ASTHMA WITHOUT COMPLICATION: Primary | ICD-10-CM

## 2023-10-02 DIAGNOSIS — R50.81 FEVER IN OTHER DISEASES: ICD-10-CM

## 2023-10-02 LAB
FLUAV RNA RESP QL NAA+PROBE: NOT DETECTED
FLUBV RNA RESP QL NAA+PROBE: NOT DETECTED
SARS-COV-2 RNA RESP QL NAA+PROBE: NOT DETECTED

## 2023-10-02 PROCEDURE — 87636 SARSCOV2 & INF A&B AMP PRB: CPT | Performed by: PHYSICIAN ASSISTANT

## 2023-10-02 RX ORDER — AZITHROMYCIN 250 MG/1
TABLET, FILM COATED ORAL
Qty: 6 TABLET | Refills: 0 | Status: SHIPPED | OUTPATIENT
Start: 2023-10-02 | End: 2023-10-02

## 2023-10-02 RX ORDER — METHYLPREDNISOLONE ACETATE 80 MG/ML
80 INJECTION, SUSPENSION INTRA-ARTICULAR; INTRALESIONAL; INTRAMUSCULAR; SOFT TISSUE ONCE
Status: COMPLETED | OUTPATIENT
Start: 2023-10-02 | End: 2023-10-02

## 2023-10-02 RX ORDER — AZITHROMYCIN 500 MG/1
500 TABLET, FILM COATED ORAL DAILY
Qty: 5 TABLET | Refills: 0 | Status: SHIPPED | OUTPATIENT
Start: 2023-10-02 | End: 2023-10-09

## 2023-10-02 RX ADMIN — METHYLPREDNISOLONE ACETATE 80 MG: 80 INJECTION, SUSPENSION INTRA-ARTICULAR; INTRALESIONAL; INTRAMUSCULAR; SOFT TISSUE at 12:57

## 2023-10-02 NOTE — PROGRESS NOTES
"Chief Complaint -cough    History of Present Illness -     Farzana Hawk is a 78 y.o. female.     Cough-  Patient complains of productive cough, runny nose, sore throat and low-grade fever that began 4 days ago.  Minimal relief with Tylenol or Xyzal.    Asthma-  Not at goal due to acute cough and symptoms as stated above.  Minimal relief with Singulair and Breo    Hypertension-  Uncontrolled as patient is coughing persistently while in office today.  This could account for the elevated blood pressure reading.  Patient reports home blood pressures usually less than 140/90 with the use of valsartan and furosemide when not acutely ill    Gastroesophageal reflux disease-  Stable with lansoprazole and dietary modification      The following portions of the patient's history were reviewed and updated as appropriate: allergies, current medications, past family history, past medical history, past social history, past surgical history and problem list.        Objective  Vital signs:  Pulse 71   Temp 97 °F (36.1 °C) (Temporal)   Ht 157.5 cm (62.01\")   Wt 64 kg (141 lb)   SpO2 95%   BMI 25.78 kg/m²     Physical Exam  Vitals and nursing note reviewed.   Constitutional:       General: She is not in acute distress.     Appearance: Normal appearance. She is well-developed. She is not diaphoretic.   HENT:      Head: Normocephalic and atraumatic.      Right Ear: Tympanic membrane, ear canal and external ear normal.      Left Ear: Tympanic membrane, ear canal and external ear normal.      Nose: Nose normal.      Mouth/Throat:      Mouth: Mucous membranes are moist.      Pharynx: Posterior oropharyngeal erythema present. No oropharyngeal exudate.   Eyes:      Extraocular Movements: Extraocular movements intact.      Conjunctiva/sclera: Conjunctivae normal.   Neck:      Thyroid: No thyromegaly.   Cardiovascular:      Rate and Rhythm: Normal rate and regular rhythm.      Heart sounds: Normal heart sounds. No murmur " heard.  Pulmonary:      Effort: Pulmonary effort is normal. No respiratory distress.      Breath sounds: No wheezing.      Comments: Bronchovesicular breath sounds noted bilaterally  Musculoskeletal:         General: No tenderness.      Cervical back: Normal range of motion and neck supple.   Lymphadenopathy:      Cervical: No cervical adenopathy.   Skin:     General: Skin is warm and dry.      Findings: No rash.   Neurological:      Mental Status: She is alert and oriented to person, place, and time.   Psychiatric:         Mood and Affect: Mood normal.         Behavior: Behavior normal.         Thought Content: Thought content normal.       The following data was reviewed by Zaira Ward PA-C:         Lab Results   Component Value Date    BUN 14 04/04/2023    CREATININE 1.04 (H) 04/04/2023    EGFR 55.5 (L) 04/04/2023    ALT 9 04/04/2023    AST 11 04/04/2023    WBC 5.09 04/04/2023    HGB 12.5 04/04/2023    HCT 38.0 04/04/2023     04/04/2023    CHOL 216 (H) 04/04/2023    TRIG 111 04/04/2023    HDL 56 04/04/2023     (H) 04/04/2023    TSH 3.890 04/04/2023           Assessment & Plan     Diagnoses and all orders for this visit:    1. Moderate persistent extrinsic asthma without complication (Primary)  -     methylPREDNISolone acetate (DEPO-medrol) injection 80 mg  -     azithromycin (Zithromax) 250 MG tablet; Take 2 tablets the first day, then 1 tablet daily for 4 days.  Dispense: 6 tablet; Refill: 0  -     COVID-19 and FLU A/B PCR - Swab, Nasopharynx; Future  -     COVID-19 and FLU A/B PCR - Swab, Nasopharynx    2. Fever in other diseases  Comments:  Respiratory testing will be performed today for further evaluation    3. Essential hypertension  Comments:  Continue valsartan and furosemide  Likely acutely elevated due to coughing and acute illness  Advised home BP monitoring    4. Gastroesophageal reflux disease without esophagitis  Comments:  Continue lansoprazole  Advised to avoid known trigger  foods                   Patient was given instructions and counseling regarding his condition or for health maintenance advice. Please see specific information pulled into the AVS if appropriate      This document has been electronically signed by:  Zaira Ward PA-C

## 2023-10-02 NOTE — PROGRESS NOTES
Injection  Injection performed in left ventrogluteal by Yu Pike MA. Patient tolerated the procedure well without complications.  10/02/23   Yu Pike MA

## 2023-10-09 ENCOUNTER — OFFICE VISIT (OUTPATIENT)
Dept: FAMILY MEDICINE CLINIC | Facility: CLINIC | Age: 78
End: 2023-10-09
Payer: MEDICARE

## 2023-10-09 VITALS
OXYGEN SATURATION: 100 % | SYSTOLIC BLOOD PRESSURE: 128 MMHG | HEIGHT: 62 IN | RESPIRATION RATE: 14 BRPM | WEIGHT: 141 LBS | TEMPERATURE: 98.6 F | HEART RATE: 83 BPM | BODY MASS INDEX: 25.95 KG/M2 | DIASTOLIC BLOOD PRESSURE: 74 MMHG

## 2023-10-09 DIAGNOSIS — K21.9 GASTROESOPHAGEAL REFLUX DISEASE WITHOUT ESOPHAGITIS: ICD-10-CM

## 2023-10-09 DIAGNOSIS — J45.40 MODERATE PERSISTENT EXTRINSIC ASTHMA WITHOUT COMPLICATION: ICD-10-CM

## 2023-10-09 DIAGNOSIS — J30.2 CHRONIC SEASONAL ALLERGIC RHINITIS: Primary | ICD-10-CM

## 2023-10-09 DIAGNOSIS — G43.009 MIGRAINE WITHOUT AURA AND WITHOUT STATUS MIGRAINOSUS, NOT INTRACTABLE: ICD-10-CM

## 2023-10-09 DIAGNOSIS — E53.8 B12 DEFICIENCY: ICD-10-CM

## 2023-10-09 DIAGNOSIS — Z23 ENCOUNTER FOR IMMUNIZATION: ICD-10-CM

## 2023-10-09 DIAGNOSIS — F33.41 RECURRENT MAJOR DEPRESSIVE DISORDER, IN PARTIAL REMISSION: ICD-10-CM

## 2023-10-09 DIAGNOSIS — K52.9 CHRONIC DIARRHEA: ICD-10-CM

## 2023-10-09 DIAGNOSIS — E55.9 VITAMIN D DEFICIENCY: ICD-10-CM

## 2023-10-09 DIAGNOSIS — I10 ESSENTIAL HYPERTENSION: ICD-10-CM

## 2023-10-09 DIAGNOSIS — M15.9 GENERALIZED OSTEOARTHRITIS: ICD-10-CM

## 2023-10-09 DIAGNOSIS — Z87.828 HISTORY OF TRAUMATIC SUBDURAL HEMATOMA: ICD-10-CM

## 2023-10-09 DIAGNOSIS — Z00.00 HEALTHCARE MAINTENANCE: ICD-10-CM

## 2023-10-09 DIAGNOSIS — M85.89 OSTEOPENIA OF MULTIPLE SITES: ICD-10-CM

## 2023-10-09 DIAGNOSIS — E78.2 MIXED HYPERLIPIDEMIA: ICD-10-CM

## 2023-10-09 PROCEDURE — 3078F DIAST BP <80 MM HG: CPT | Performed by: GENERAL PRACTICE

## 2023-10-09 PROCEDURE — 3074F SYST BP LT 130 MM HG: CPT | Performed by: GENERAL PRACTICE

## 2023-10-09 PROCEDURE — 90662 IIV NO PRSV INCREASED AG IM: CPT | Performed by: GENERAL PRACTICE

## 2023-10-09 PROCEDURE — 99214 OFFICE O/P EST MOD 30 MIN: CPT | Performed by: GENERAL PRACTICE

## 2023-10-09 PROCEDURE — G0008 ADMIN INFLUENZA VIRUS VAC: HCPCS | Performed by: GENERAL PRACTICE

## 2023-10-09 RX ORDER — MONTELUKAST SODIUM 10 MG/1
10 TABLET ORAL DAILY
Qty: 90 TABLET | Refills: 3 | Status: SHIPPED | OUTPATIENT
Start: 2023-10-09

## 2023-10-09 RX ORDER — ALBUTEROL SULFATE 90 UG/1
2 AEROSOL, METERED RESPIRATORY (INHALATION) EVERY 4 HOURS PRN
Qty: 54 G | Refills: 3 | Status: SHIPPED | OUTPATIENT
Start: 2023-10-09

## 2023-10-09 RX ORDER — BUDESONIDE, GLYCOPYRROLATE, AND FORMOTEROL FUMARATE 160; 9; 4.8 UG/1; UG/1; UG/1
2 AEROSOL, METERED RESPIRATORY (INHALATION) 2 TIMES DAILY
Qty: 2 EACH | Refills: 0 | COMMUNITY
Start: 2023-10-09 | End: 2023-10-13

## 2023-10-09 RX ORDER — LEVOCETIRIZINE DIHYDROCHLORIDE 5 MG/1
5 TABLET, FILM COATED ORAL DAILY
Qty: 90 TABLET | Refills: 3 | Status: SHIPPED | OUTPATIENT
Start: 2023-10-09

## 2023-10-09 RX ORDER — FLUTICASONE PROPIONATE 220 UG/1
1 AEROSOL, METERED RESPIRATORY (INHALATION)
Qty: 36 G | Refills: 3 | Status: SHIPPED | OUTPATIENT
Start: 2023-10-09 | End: 2023-10-13

## 2023-10-09 RX ORDER — PROMETHAZINE HYDROCHLORIDE 25 MG/1
25 TABLET ORAL EVERY 6 HOURS PRN
Qty: 60 TABLET | Refills: 0 | Status: SHIPPED | OUTPATIENT
Start: 2023-10-09

## 2023-10-09 RX ORDER — BUDESONIDE, GLYCOPYRROLATE, AND FORMOTEROL FUMARATE 160; 9; 4.8 UG/1; UG/1; UG/1
2 AEROSOL, METERED RESPIRATORY (INHALATION) 2 TIMES DAILY
Qty: 2 EACH | Refills: 0 | COMMUNITY
Start: 2023-10-09 | End: 2023-10-09

## 2023-10-09 NOTE — PROGRESS NOTES
Subjective   Farzana Hawk is a 78 y.o. female.     Chief Complaint  She returns for a scheduled reassessment of multiple medical problems including essential hypertension, gastroesophageal reflux disease, chronic diarrhea, previous left subdural hematoma, and a recent asthma exacerbation    History of Present Illness     Asthma  She has had a recent exacerbation associated with a URTI.  She admits to an increased cough and shortness of breath. She continues to deny any chest pain or hemoptysis. She remains on montelukast but stopped her ICS/LABA sometime ago. She is currently using her rescue medicine once or twice daily.  She has also found benzonatate helpful.  Suspected precipitants include upper respiratory infection and allergens.    Essential Hypertension  Home blood pressure readings: are consistently at goal. Associated signs and symptoms: dyspnea.  She continues to deny any chest pain, palpitations, orthopnea, paroxysmal nocturnal dyspnea or peripheral edema.  She is on no antihypertensive therapy at present    Gastroesophageal Reflux Disease  She remains heartburn free on lansoprazole.  She continues to deny any difficulty swallowing, nausea, or vomiting.    Chronic Diarrhea  She gives a 4-5 year history of intermittent diarrhea.  This is occasionally explosive and is frequently associated with mild cramping.  There is no history of any nausea, vomiting, hematochezia, or melena and she continues to deny any fever, chills, or night sweats.  She has occasional episodes at night but the vast majority are during the day and remain unpredictable.  She has been unable to identify any exacerbating factors.  She remains uninterested in a GI opinion    Left Subdural Hematoma  She was discharged from [facility] on 3/14/2020 after undergoing evacuation of a left subdural hematoma.  Her course in hospital was uncomplicated.  While she continues to have intermittent headaches these are no different then she has had in  the past.  She continues to deny any problems with her speech, vision, strength, or sensation.      The following portions of the patient's history were reviewed and updated as appropriate: allergies, current medications, past medical history, past social history, and problem list.    Review of Systems   Constitutional:  Positive for fatigue. Negative for appetite change, chills, fever and unexpected weight change.   HENT:  Positive for rhinorrhea. Negative for congestion, ear pain, sneezing and sore throat.    Eyes:  Negative for visual disturbance.   Respiratory:  Positive for cough and shortness of breath (with exertion). Negative for wheezing.    Cardiovascular:  Negative for chest pain, palpitations and leg swelling.   Gastrointestinal:  Positive for diarrhea. Negative for abdominal pain, blood in stool, constipation, nausea and vomiting.   Genitourinary:  Negative for dysuria, frequency, hematuria and urgency.   Musculoskeletal:  Positive for arthralgias and back pain. Negative for joint swelling and myalgias.   Skin:  Negative for rash.   Neurological:  Positive for headaches. Negative for weakness and numbness.   Psychiatric/Behavioral:  Positive for sleep disturbance. Negative for dysphoric mood and suicidal ideas. The patient is nervous/anxious.      Objective   Physical Exam  Constitutional:       General: She is not in acute distress.     Appearance: Normal appearance. She is well-developed. She is not diaphoretic.      Comments: Bright and in good spirits. No apparent distress. No pallor, jaundice, diaphoresis, or cyanosis.   HENT:      Head: Atraumatic.      Right Ear: Tympanic membrane, ear canal and external ear normal.      Left Ear: Tympanic membrane, ear canal and external ear normal.      Mouth/Throat:      Lips: No lesions.      Mouth: Mucous membranes are moist. No oral lesions.      Pharynx: No oropharyngeal exudate or posterior oropharyngeal erythema.   Eyes:      General: Lids are normal.       Extraocular Movements: Extraocular movements intact.      Conjunctiva/sclera: Conjunctivae normal.      Pupils: Pupils are equal.   Neck:      Thyroid: No thyroid mass or thyromegaly.      Vascular: No carotid bruit.      Trachea: Trachea normal. No tracheal deviation.   Cardiovascular:      Rate and Rhythm: Normal rate and regular rhythm.      Heart sounds: Normal heart sounds, S1 normal and S2 normal. No murmur heard.     No gallop.   Pulmonary:      Effort: Pulmonary effort is normal.      Breath sounds: Wheezing (diffuse - mild -primarily on forced vital capacity) present.   Abdominal:      General: Bowel sounds are normal. There is no distension.   Musculoskeletal:      Right lower leg: No edema.      Left lower leg: No edema.   Lymphadenopathy:      Head:      Right side of head: No submental, submandibular, tonsillar, preauricular, posterior auricular or occipital adenopathy.      Left side of head: No submental, submandibular, tonsillar, preauricular, posterior auricular or occipital adenopathy.      Cervical: No cervical adenopathy.      Upper Body:      Right upper body: No supraclavicular adenopathy.      Left upper body: No supraclavicular adenopathy.   Skin:     General: Skin is warm.      Coloration: Skin is not cyanotic, jaundiced or pale.      Findings: No rash.      Nails: There is no clubbing.   Neurological:      Mental Status: She is alert and oriented to person, place, and time.      Cranial Nerves: No dysarthria or facial asymmetry.      Sensory: No sensory deficit.      Motor: No tremor.      Coordination: Coordination normal.      Gait: Gait normal.   Psychiatric:         Attention and Perception: Attention normal.         Mood and Affect: Mood normal.         Speech: Speech normal.         Behavior: Behavior normal.         Thought Content: Thought content normal.       Assessment & Plan   Problems Addressed this Visit          Allergies and Adverse Reactions    Chronic seasonal allergic  rhinitis   Continue current medication  Encouraged to report if any worse or if any new symptoms or concerns.    Relevant Medications    levocetirizine (XYZAL) 5 MG tablet    montelukast (SINGULAIR) 10 MG tablet       Cardiac and Vasculature    Essential hypertension   Hypertension:  BP at goal off medication . Evidence of target organ damage: none.  Encouraged to continue to work on diet and exercise plan.     Mixed hyperlipidemia  As above.  Updated labs will be arranged at return.       Endocrine and Metabolic    B12 deficiency    Vitamin D deficiency       Gastrointestinal Abdominal     Chronic diarrhea  Patient remains uninterested in any work-up  Encouraged to report if any worse or if any new symptoms or concerns.    Gastroesophageal reflux disease without esophagitis   Symptoms are currently stable.  Encouraged to report if this should change.  Continue current medication       Health Encounters    Healthcare maintenance  Flu shot administered.  Recommended an updated COVID-19 vaccination.  Reviewed the potential benefits of RSV vaccination.  Reminded that she is due for Shingrix  She remains uninterested in any cancer screening    Relevant Orders    Fluzone High-Dose 65+yrs (Completed)       Mental Health    Recurrent major depressive disorder, in partial remission  Significant situational component.   Supportive therapy.        Musculoskeletal and Injuries    Generalized osteoarthritis    History of traumatic subdural hematoma    Osteopenia of multiple sites       Neuro    Migraine without aura and without status migrainosus, not intractable       Pulmonary and Pneumonias    Extrinsic asthma without complication  With recent exacerbation  Prescription written for fluticasone 222 puffs twice daily until 1 week after symptoms resolved.  She will do the same in the future at the first sign of an exacerbation.  Provided with samples of breztri 2 puffs twice daily until the fluticasone arrives    Relevant  Medications    Budeson-Glycopyrrol-Formoterol (Breztri Aerosphere) 160-9-4.8 MCG/ACT aerosol inhaler    fluticasone (FLOVENT HFA) 220 MCG/ACT inhaler    albuterol sulfate  (90 Base) MCG/ACT inhaler    montelukast (SINGULAIR) 10 MG tablet                        Diagnoses         Codes Comments    Chronic seasonal allergic rhinitis    -  Primary ICD-10-CM: J30.2  ICD-9-CM: 477.8     Essential hypertension     ICD-10-CM: I10  ICD-9-CM: 401.9     Mixed hyperlipidemia     ICD-10-CM: E78.2  ICD-9-CM: 272.2     B12 deficiency     ICD-10-CM: E53.8  ICD-9-CM: 266.2     Vitamin D deficiency     ICD-10-CM: E55.9  ICD-9-CM: 268.9     Chronic diarrhea     ICD-10-CM: K52.9  ICD-9-CM: 787.91     Gastroesophageal reflux disease without esophagitis     ICD-10-CM: K21.9  ICD-9-CM: 530.81     Healthcare maintenance     ICD-10-CM: Z00.00  ICD-9-CM: V70.0     Recurrent major depressive disorder, in partial remission     ICD-10-CM: F33.41  ICD-9-CM: 296.35     Generalized osteoarthritis     ICD-10-CM: M15.9  ICD-9-CM: 715.00     History of traumatic subdural hematoma     ICD-10-CM: Z87.828  ICD-9-CM: V12.59     Osteopenia of multiple sites     ICD-10-CM: M85.89  ICD-9-CM: 733.90     Migraine without aura and without status migrainosus, not intractable     ICD-10-CM: G43.009  ICD-9-CM: 346.10     Moderate persistent extrinsic asthma without complication     ICD-10-CM: J45.40  ICD-9-CM: 493.00     Encounter for immunization     ICD-10-CM: Z23  ICD-9-CM: V03.89

## 2023-10-13 ENCOUNTER — TELEPHONE (OUTPATIENT)
Dept: FAMILY MEDICINE CLINIC | Facility: CLINIC | Age: 78
End: 2023-10-13

## 2023-10-13 DIAGNOSIS — J45.40 MODERATE PERSISTENT EXTRINSIC ASTHMA WITHOUT COMPLICATION: ICD-10-CM

## 2023-10-13 DIAGNOSIS — G43.009 MIGRAINE WITHOUT AURA AND WITHOUT STATUS MIGRAINOSUS, NOT INTRACTABLE: Primary | ICD-10-CM

## 2023-10-13 RX ORDER — BECLOMETHASONE DIPROPIONATE HFA 80 UG/1
2 AEROSOL, METERED RESPIRATORY (INHALATION)
Qty: 31.8 G | Refills: 3 | Status: SHIPPED | OUTPATIENT
Start: 2023-10-13

## 2023-10-13 NOTE — TELEPHONE ENCOUNTER
Pharmacy Name:  [facility]    Reference Number (if applicable): [id number]    Pharmacy representative name: ALLAN    Pharmacy representative phone number: [phone]     What medication are you calling in regards to: FLUTICASONE    What question does the pharmacy have: PATIENT'S INSURANCE WILL NOT PAY FOR THE ABOVE MEDICATION, BUT WILL PAY FOR JONAH DAVIS    Who is the provider that prescribed the medication: BEBE    Additional notes:

## 2023-12-05 DIAGNOSIS — M15.9 GENERALIZED OSTEOARTHRITIS: ICD-10-CM

## 2023-12-05 RX ORDER — TRAMADOL HYDROCHLORIDE 50 MG/1
50 TABLET ORAL 4 TIMES DAILY
Qty: 120 TABLET | Refills: 3 | OUTPATIENT
Start: 2023-12-05

## 2023-12-05 RX ORDER — TRAMADOL HYDROCHLORIDE 50 MG/1
50 TABLET ORAL 4 TIMES DAILY
Qty: 120 TABLET | Refills: 2 | Status: SHIPPED | OUTPATIENT
Start: 2023-12-05

## 2024-04-11 ENCOUNTER — TELEPHONE (OUTPATIENT)
Dept: FAMILY MEDICINE CLINIC | Facility: CLINIC | Age: 79
End: 2024-04-11
Payer: MEDICARE

## 2024-06-03 ENCOUNTER — OFFICE VISIT (OUTPATIENT)
Dept: FAMILY MEDICINE CLINIC | Facility: CLINIC | Age: 79
End: 2024-06-03
Payer: MEDICARE

## 2024-06-03 VITALS
WEIGHT: 139 LBS | SYSTOLIC BLOOD PRESSURE: 136 MMHG | OXYGEN SATURATION: 97 % | RESPIRATION RATE: 14 BRPM | TEMPERATURE: 97.6 F | HEART RATE: 64 BPM | BODY MASS INDEX: 25.58 KG/M2 | DIASTOLIC BLOOD PRESSURE: 80 MMHG | HEIGHT: 62 IN

## 2024-06-03 DIAGNOSIS — F33.41 RECURRENT MAJOR DEPRESSIVE DISORDER, IN PARTIAL REMISSION: ICD-10-CM

## 2024-06-03 DIAGNOSIS — E78.2 MIXED HYPERLIPIDEMIA: ICD-10-CM

## 2024-06-03 DIAGNOSIS — Z00.00 HEALTHCARE MAINTENANCE: ICD-10-CM

## 2024-06-03 DIAGNOSIS — K52.9 CHRONIC DIARRHEA: ICD-10-CM

## 2024-06-03 DIAGNOSIS — J30.2 CHRONIC SEASONAL ALLERGIC RHINITIS: Primary | ICD-10-CM

## 2024-06-03 DIAGNOSIS — M54.59 MECHANICAL LOW BACK PAIN: ICD-10-CM

## 2024-06-03 DIAGNOSIS — Z51.81 ENCOUNTER FOR THERAPEUTIC DRUG MONITORING: ICD-10-CM

## 2024-06-03 DIAGNOSIS — M15.9 GENERALIZED OSTEOARTHRITIS: ICD-10-CM

## 2024-06-03 DIAGNOSIS — I10 ESSENTIAL HYPERTENSION: ICD-10-CM

## 2024-06-03 DIAGNOSIS — Z87.828 HISTORY OF TRAUMATIC SUBDURAL HEMATOMA: ICD-10-CM

## 2024-06-03 DIAGNOSIS — Z79.899 ENCOUNTER FOR LONG-TERM (CURRENT) USE OF OTHER MEDICATIONS: ICD-10-CM

## 2024-06-03 DIAGNOSIS — K21.9 GASTROESOPHAGEAL REFLUX DISEASE WITHOUT ESOPHAGITIS: ICD-10-CM

## 2024-06-03 DIAGNOSIS — J45.40 MODERATE PERSISTENT EXTRINSIC ASTHMA WITHOUT COMPLICATION: ICD-10-CM

## 2024-06-03 DIAGNOSIS — M85.89 OSTEOPENIA OF MULTIPLE SITES: ICD-10-CM

## 2024-06-03 DIAGNOSIS — E53.8 B12 DEFICIENCY: ICD-10-CM

## 2024-06-03 DIAGNOSIS — E55.9 VITAMIN D DEFICIENCY: ICD-10-CM

## 2024-06-03 DIAGNOSIS — G43.009 MIGRAINE WITHOUT AURA AND WITHOUT STATUS MIGRAINOSUS, NOT INTRACTABLE: ICD-10-CM

## 2024-06-03 PROCEDURE — 36415 COLL VENOUS BLD VENIPUNCTURE: CPT | Performed by: GENERAL PRACTICE

## 2024-06-03 PROCEDURE — 3075F SYST BP GE 130 - 139MM HG: CPT | Performed by: GENERAL PRACTICE

## 2024-06-03 PROCEDURE — 85025 COMPLETE CBC W/AUTO DIFF WBC: CPT | Performed by: GENERAL PRACTICE

## 2024-06-03 PROCEDURE — 82607 VITAMIN B-12: CPT | Performed by: GENERAL PRACTICE

## 2024-06-03 PROCEDURE — 82306 VITAMIN D 25 HYDROXY: CPT | Performed by: GENERAL PRACTICE

## 2024-06-03 PROCEDURE — 3079F DIAST BP 80-89 MM HG: CPT | Performed by: GENERAL PRACTICE

## 2024-06-03 PROCEDURE — 80053 COMPREHEN METABOLIC PANEL: CPT | Performed by: GENERAL PRACTICE

## 2024-06-03 PROCEDURE — 84443 ASSAY THYROID STIM HORMONE: CPT | Performed by: GENERAL PRACTICE

## 2024-06-03 PROCEDURE — 80061 LIPID PANEL: CPT | Performed by: GENERAL PRACTICE

## 2024-06-03 PROCEDURE — 99214 OFFICE O/P EST MOD 30 MIN: CPT | Performed by: GENERAL PRACTICE

## 2024-06-03 PROCEDURE — 1170F FXNL STATUS ASSESSED: CPT | Performed by: GENERAL PRACTICE

## 2024-06-03 PROCEDURE — G0439 PPPS, SUBSEQ VISIT: HCPCS | Performed by: GENERAL PRACTICE

## 2024-06-03 RX ORDER — TRAMADOL HYDROCHLORIDE 50 MG/1
50 TABLET ORAL 4 TIMES DAILY
Qty: 120 TABLET | Refills: 2 | Status: SHIPPED | OUTPATIENT
Start: 2024-06-03

## 2024-06-03 RX ORDER — TOPIRAMATE 25 MG/1
TABLET ORAL
Qty: 360 TABLET | Refills: 0 | Status: SHIPPED | OUTPATIENT
Start: 2024-06-03

## 2024-06-03 NOTE — PATIENT INSTRUCTIONS
Advance Directive    Advance directives are legal documents that allow you to make decisions about your health care and medical treatment in case you become unable to communicate for yourself. Advance directives let your wishes be known to family, friends, and health care providers.  Discussing and writing advance directives should happen over time rather than all at once. Advance directives can be changed and updated at any time. There are different types of advance directives, such as:  Medical power of .  Living will.  Do not resuscitate (DNR) order or do not attempt resuscitation (DNAR) order.  Health care proxy and medical power of   A health care proxy is also called a health care agent. This person is appointed to make medical decisions for you when you are unable to make decisions for yourself. Generally, people ask a trusted friend or family member to act as their proxy and represent their preferences. Make sure you have an agreement with your trusted person to act as your proxy. A proxy may have to make a medical decision on your behalf if your wishes are not known.  A medical power of , also called a durable power of  for health care, is a legal document that names your health care proxy. Depending on the laws in your state, the document may need to be:  Signed.  Notarized.  Dated.  Copied.  Witnessed.  Incorporated into your medical record.  You may also want to appoint a trusted person to manage your money in the event you are unable to do so. This is called a durable power of  for finances. It is a separate legal document from the durable power of  for health care. You may choose your health care proxy or someone different to act as your agent in money matters.  If you do not appoint a proxy, or there is a concern that the proxy is not acting in your best interest, a court may appoint a guardian to act on your behalf.  Living will  A living will is a set  of instructions that state your wishes about medical care when you cannot express them yourself. Health care providers should keep a copy of your living will in your medical record. You may want to give a copy to family members or friends. To alert caregivers in case of an emergency, you can place a card in your wallet to let them know that you have a living will and where they can find it. A living will is used if you become:  Terminally ill.  Disabled.  Unable to communicate or make decisions.  The following decisions should be included in your living will:  To use or not to use life support equipment, such as dialysis machines and breathing machines (ventilators).  Whether you want a DNR or DNAR order. This tells health care providers not to use cardiopulmonary resuscitation (CPR) if breathing or heartbeat stops.  To use or not to use tube feeding.  To be given or not to be given food and fluids.  Whether you want comfort (palliative) care when the goal becomes comfort rather than a cure.  Whether you want to donate your organs and tissues.  A living will does not give instructions for distributing your money and property if you should pass away.  DNR or DNAR  A DNR or DNAR order is a request not to have CPR in the event that your heart stops beating or you stop breathing. If a DNR or DNAR order has not been made and shared, a health care provider will try to help any patient whose heart has stopped or who has stopped breathing. If you plan to have surgery, talk with your health care provider about how your DNR or DNAR order will be followed if problems occur.  What if I do not have an advance directive?  Some states assign family decision makers to act on your behalf if you do not have an advance directive. Each state has its own laws about advance directives. You may want to check with your health care provider, , or state representative about the laws in your state.  Summary  Advance directives are  legal documents that allow you to make decisions about your health care and medical treatment in case you become unable to communicate for yourself.  The process of discussing and writing advance directives should happen over time. You can change and update advance directives at any time.  Advance directives may include a medical power of , a living will, and a DNR or DNAR order.  This information is not intended to replace advice given to you by your health care provider. Make sure you discuss any questions you have with your health care provider.  Document Revised: 09/21/2021 Document Reviewed: 09/21/2021  [employer] Patient Education © 2024 [employer]  Mammogram  A mammogram is an X-ray of the breasts. This procedure can screen for and detect any changes that may indicate breast cancer. Mammograms are regularly done beginning at age 40 for women with average risk. A man may have a mammogram if he has a lump or swelling in his breast tissue.  A mammogram can also identify other changes and variations in the breast, such as:  Inflammation of the breast tissue (mastitis).  An infected area that contains a collection of pus (abscess).  A fluid-filled sac (cyst).  Tumors that are not cancerous (benign).  Fibrocystic changes. This is when breast tissue becomes denser and can make the tissue feel rope-like or uneven under the skin.  Women at higher risk for breast cancer need earlier and more comprehensive screening for abnormal changes. Breast tomosynthesis, or three-dimensional (3D) mammography, and digital breast tomosynthesis are advanced forms of imaging that create 3D pictures of the breasts.  Tell a health care provider:  About any allergies you have.  If you have breast implants.  If you have had previous breast disease, biopsy, or surgery.  If you have a family history of breast cancer.  If you are breastfeeding.  Whether you are pregnant or may be pregnant.  What are the risks?  Generally, this is a  safe procedure. However, problems may occur, including:  Exposure to radiation. Radiation levels are very low with this test.  The need for more tests.  The mammogram fails to detect certain cancers or the results are misinterpreted.  Difficulty with detecting breast cancer in women with dense breasts.  What happens before the procedure?  Schedule your test about 1-2 weeks after your menstrual period if you are menstruating. This is usually when your breasts are the least tender.  If you have had a mammogram done at a different facility in the past, get the mammogram X-rays or have them sent to your current exam facility. The new and old images will be compared.  Wash your breasts and underarms on the day of the test.  Do not wear deodorants, perfumes, lotions, or powders anywhere on your body on the day of the test.  Remove any jewelry from your neck.  Wear clothes that you can change into and out of easily.  What happens during the procedure?    You will undress from the waist up and put on a gown that opens in the front.  You will  front of the X-ray machine.  Each breast will be placed between two plastic or glass plates. The plates will compress your breast for a few seconds. Try to stay as relaxed as possible. This procedure does not cause any harm to your breasts. Any discomfort you feel will be very brief.  X-rays will be taken from different angles of each breast.  The procedure may vary among health care providers and hospitals.  What can I expect after the procedure?  The mammogram will be examined by a specialist (radiologist).  You may need to repeat certain parts of the test, depending on the quality of the images. This is done if the radiologist needs a better view of the breast tissue.  You may resume your normal activities.  It is up to you to get the results of your procedure. Ask your health care provider, or the department that is doing the procedure, when your results will be  ready.  Summary  A mammogram is an X-ray of the breasts. This procedure can screen for and detect any changes that may indicate breast cancer.  A man may have a mammogram if he has a lump or swelling in his breast tissue.  If you have had a mammogram done at a different facility in the past, get the mammogram X-rays or have them sent to your current exam facility in order to compare them.  Schedule your test about 1-2 weeks after your menstrual period if you are menstruating.  Ask when your test results will be ready. Make sure you get your test results.  This information is not intended to replace advice given to you by your health care provider. Make sure you discuss any questions you have with your health care provider.  Document Revised: 08/31/2022 Document Reviewed: 10/18/2021  [employer] Patient Education © 2024 [employer]

## 2024-06-03 NOTE — PROGRESS NOTES
The ABCs of the Annual Wellness Visit  Subsequent Medicare Wellness Visit    Subjective    Farzana Hawk is a 78 y.o. female who presents for a Subsequent Medicare Wellness Visit.    The following portions of the patient's history were reviewed and   updated as appropriate: allergies, current medications, past family history, past medical history, past social history, past surgical history, and problem list.    Compared to one year ago, the patient feels her physical   health is the same.    Compared to one year ago, the patient feels her mental   health is the same.    Recent Hospitalizations:  She was not admitted to the hospital during the last year.     Current Medical Providers:  Patient Care Team:  Luis Sim MD as PCP - General (Family Medicine)    Outpatient Medications Prior to Visit   Medication Sig Dispense Refill    albuterol sulfate  (90 Base) MCG/ACT inhaler Inhale 2 puffs Every 4 (Four) Hours As Needed for Wheezing. 54 g 3    cholecalciferol (Vitamin D) 25 MCG (1000 UT) tablet Take 2 tablets by mouth Daily. 60 tablet 5    lansoprazole (PREVACID) 30 MG capsule Take 1 capsule by mouth Daily. 90 capsule 3    levocetirizine (XYZAL) 5 MG tablet Take 1 tablet by mouth Daily. 90 tablet 3    montelukast (SINGULAIR) 10 MG tablet Take 1 tablet by mouth Daily. 90 tablet 3    promethazine (PHENERGAN) 25 MG tablet Take 1 tablet by mouth Every 6 (Six) Hours As Needed for Nausea or Vomiting. 60 tablet 0    vitamin B-12 (CYANOCOBALAMIN) 1000 MCG tablet Take 1 tablet by mouth Daily. 30 tablet 5    Beclomethasone Diprop HFA (Qvar RediHaler) 80 MCG/ACT inhaler Inhale 2 puffs 2 (Two) Times a Day. 31.8 g 3    traMADol (ULTRAM) 50 MG tablet Take 1 tablet by mouth 4 (Four) Times a Day. 120 tablet 2     No facility-administered medications prior to visit.     Opioid medication/s are on active medication list.  and I have evaluated her active treatment plan and pain score trends (see table).  There  "were no vitals filed for this visit.  I have reviewed the chart for potential of high risk medication and harmful drug interactions in the elderly.          Aspirin is not on active medication list.  Aspirin use is not indicated based on review of current medical condition/s. Risk of harm outweighs potential benefits.  .    Patient Active Problem List   Diagnosis    Recurrent major depressive disorder, in partial remission    Generalized osteoarthritis    Mixed hyperlipidemia    Essential hypertension    Extrinsic asthma without complication    Chronic seasonal allergic rhinitis    Gastroesophageal reflux disease without esophagitis    Osteopenia of multiple sites    Vitamin D deficiency    Migraine without aura and without status migrainosus, not intractable    Healthcare maintenance    Encounter for immunization    History of traumatic subdural hematoma    Mechanical low back pain    Chronic diarrhea    B12 deficiency     Advance Care Planning   Advance Care Planning     Advance Directive is not on file.  ACP discussion was held with the patient during this visit. Patient does not have an advance directive, information provided.     Objective    Vitals:    06/03/24 1107   BP: 136/80   Pulse: 64   Resp: 14   Temp: 97.6 °F (36.4 °C)   TempSrc: Temporal   SpO2: 97%   Weight: 63 kg (139 lb)   Height: 157.5 cm (62.01\")     Estimated body mass index is 25.42 kg/m² as calculated from the following:    Height as of this encounter: 157.5 cm (62.01\").    Weight as of this encounter: 63 kg (139 lb).    BMI is >= 25 and <30. (Overweight) The following options were offered after discussion;: exercise counseling/recommendations and nutrition counseling/recommendations    Does the patient have evidence of cognitive impairment? No       HEALTH RISK ASSESSMENT    Smoking Status:  Social History     Tobacco Use   Smoking Status Never    Passive exposure: Never   Smokeless Tobacco Never     Alcohol Consumption:  Social History "     Substance and Sexual Activity   Alcohol Use No     Fall Risk Screen:  ASUNCIONADI Fall Risk Assessment was completed, and patient is at LOW risk for falls.Assessment completed on:6/3/2024    Depression Screenin/3/2024    11:09 AM   PHQ-2/PHQ-9 Depression Screening   Little Interest or Pleasure in Doing Things 0-->not at all   Feeling Down, Depressed or Hopeless 0-->not at all   PHQ-9: Brief Depression Severity Measure Score 0     Health Habits and Functional and Cognitive Screenin/3/2024    11:07 AM   Functional & Cognitive Status   Do you have difficulty preparing food and eating? No   Do you have difficulty bathing yourself, getting dressed or grooming yourself? No   Do you have difficulty using the toilet? No   Do you have difficulty moving around from place to place? Yes   Do you have trouble with steps or getting out of a bed or a chair? No   Current Diet Well Balanced Diet   Dental Exam Up to date   Eye Exam Up to date   Exercise (times per week) 7 times per week   Current Exercises Include Walking   Do you need help using the phone?  No   Are you deaf or do you have serious difficulty hearing?  No   Do you need help to go to places out of walking distance? No   Do you need help shopping? No   Do you need help preparing meals?  No   Do you need help with housework?  No   Do you need help with laundry? No   Do you need help taking your medications? No   Do you need help managing money? No   Do you ever drive or ride in a car without wearing a seat belt? No   Have you felt unusual stress, anger or loneliness in the last month? No   Who do you live with? Other   If you need help, do you have trouble finding someone available to you? No   Have you been bothered in the last four weeks by sexual problems? No   Do you have difficulty concentrating, remembering or making decisions? No     Age-appropriate Screening Schedule:  Refer to the list below for future screening recommendations based on  patient's age, sex and/or medical conditions. Orders for these recommended tests are listed in the plan section. The patient has been provided with a written plan.    Health Maintenance   Topic Date Due    RSV Vaccine - Adults (1 - 1-dose 60+ series) Never done    ZOSTER VACCINE (2 of 3) 09/26/2016    COVID-19 Vaccine (5 - 2023-24 season) 09/01/2023    LIPID PANEL  04/04/2024    INFLUENZA VACCINE  08/01/2024    DXA SCAN  05/19/2025    ANNUAL WELLNESS VISIT  06/03/2025    BMI FOLLOWUP  06/03/2025    COLORECTAL CANCER SCREENING  05/18/2026    TDAP/TD VACCINES (2 - Td or Tdap) 02/28/2027    HEPATITIS C SCREENING  Completed    Pneumococcal Vaccine 65+  Completed        CMS Preventative Services Quick Reference  Risk Factors Identified During Encounter  Chronic Pain: Natural history and expected course discussed. Questions answered.  Depression/Dysphoria:  Doing well at present. Encouraged to report if this should change  Immunizations Discussed/Encouraged: Shingrix, COVID19, and RSV (Respiratory Syncytial Virus)  The above risks/problems have been discussed with the patient.  Pertinent information has been shared with the patient in the After Visit Summary.  An After Visit Summary and PPPS were made available to the patient.    Follow Up:   Next Medicare Wellness visit to be scheduled in 1 year.       Additional E&M Note during same encounter follows:  Patient has multiple medical problems which are significant and separately identifiable that require additional work above and beyond the Medicare Wellness Visit.      Chief Complaint  She returns for a scheduled reassessment of multiple medical problems including asthma, essential hypertension, gastroesophageal reflux disease, chronic diarrhea, previous left subdural hematoma, migraine headaches, and osteoarthritis    Subjective        HPI    Asthma  She admits to an intermittent dry cough with occasional mild shortness of breath. She continues to deny any chest pain or  hemoptysis. She remains on montelukast and is currently using her rescue inhaler once or twice weekly.  She has a history of chronic allergic rhinitis for which she is also taking levocetirizine    Essential Hypertension  She admits to occasional mild shortness of breath.  She continues to deny any chest pain, palpitations, orthopnea, paroxysmal nocturnal dyspnea or peripheral edema.  She is on no antihypertensive therapy at present    Gastroesophageal Reflux Disease  She remains heartburn free on lansoprazole.  She continues to deny any difficulty swallowing, nausea, or vomiting.    Chronic Diarrhea  She gives an approximate 5-year history of intermittent diarrhea.  This is occasionally explosive and is frequently associated with mild cramping.  There is no history of any nausea, vomiting, hematochezia, or melena and she continues to deny any fever, chills, or night sweats.  She has occasional episodes at night but the vast majority are during the day and remain unpredictable.  She has been unable to identify any exacerbating factors.  She remains uninterested in a GI opinion    Previous Left Subdural Hematoma  She was discharged from [facility] on 3/14/2020 after undergoing evacuation of a left subdural hematoma.  Her course in hospital was uncomplicated.  While she continues to have intermittent headaches these are no different then she has had in the past.  She continues to deny any problems with her speech, vision, strength, or sensation.  She denies any recent falls or close calls    Migraine Headache  She has a long history of intermittent headaches.  These are currently occurring once or twice weekly and generally lasts until she takes excedrin and/or tramadol.  She denies any warning or associated symptoms.  She was previously on topiramate with good effect    Osteoarthritis  She gives a long history of generalized joint pain associated with mild stiffness.  She denies any joint warmth or swelling.  With tramadol  "50 4 times daily she is able to perform her ADLs, walks him, and generally sleep    Review of Systems   Constitutional:  Positive for fatigue. Negative for appetite change, chills, diaphoresis and fever.   HENT:  Positive for rhinorrhea. Negative for congestion, ear pain, postnasal drip, sinus pressure, sneezing, sore throat, tinnitus and voice change.    Eyes:  Negative for visual disturbance.   Respiratory:  Positive for cough and shortness of breath. Negative for wheezing.    Cardiovascular:  Negative for chest pain, palpitations and leg swelling.   Gastrointestinal:  Positive for diarrhea. Negative for abdominal pain, blood in stool, constipation, nausea and vomiting.   Endocrine: Negative for polydipsia and polyuria.   Genitourinary:  Negative for dysuria, frequency, hematuria and urgency.   Musculoskeletal:  Positive for arthralgias and back pain. Negative for joint swelling and myalgias.   Skin:  Negative for rash.   Neurological:  Negative for weakness, numbness and confusion.   Psychiatric/Behavioral:  Negative for decreased concentration, dysphoric mood, sleep disturbance and suicidal ideas. The patient is nervous/anxious.      Objective   Vital Signs:  /80   Pulse 64   Temp 97.6 °F (36.4 °C) (Temporal)   Resp 14   Ht 157.5 cm (62.01\")   Wt 63 kg (139 lb)   SpO2 97%   BMI 25.42 kg/m²     Physical Exam  Constitutional:       General: She is not in acute distress.     Appearance: Normal appearance. She is well-developed. She is not diaphoretic.      Comments: Bright and in good spirits. No apparent distress. No pallor, jaundice, diaphoresis, or cyanosis.   HENT:      Head: Atraumatic.      Right Ear: Tympanic membrane, ear canal and external ear normal.      Left Ear: Tympanic membrane, ear canal and external ear normal.      Mouth/Throat:      Lips: No lesions.      Mouth: Mucous membranes are moist. No oral lesions.      Pharynx: No oropharyngeal exudate or posterior oropharyngeal erythema. "   Eyes:      General: Lids are normal.      Extraocular Movements: Extraocular movements intact.      Conjunctiva/sclera: Conjunctivae normal.      Pupils: Pupils are equal.   Neck:      Thyroid: No thyroid mass or thyromegaly.      Vascular: No carotid bruit.      Trachea: Trachea normal. No tracheal deviation.   Cardiovascular:      Rate and Rhythm: Normal rate and regular rhythm.      Heart sounds: Normal heart sounds, S1 normal and S2 normal. No murmur heard.     No gallop.   Pulmonary:      Effort: Pulmonary effort is normal.      Breath sounds: No wheezing.   Abdominal:      General: Bowel sounds are normal. There is no distension or abdominal bruit.      Palpations: Abdomen is soft. There is no hepatomegaly, splenomegaly or mass.      Tenderness: There is no abdominal tenderness.      Hernia: No hernia is present.   Musculoskeletal:      Right lower leg: No edema.      Left lower leg: No edema.   Lymphadenopathy:      Head:      Right side of head: No submental, submandibular, tonsillar, preauricular, posterior auricular or occipital adenopathy.      Left side of head: No submental, submandibular, tonsillar, preauricular, posterior auricular or occipital adenopathy.      Cervical: No cervical adenopathy.      Upper Body:      Right upper body: No supraclavicular adenopathy.      Left upper body: No supraclavicular adenopathy.   Skin:     General: Skin is warm.      Coloration: Skin is not cyanotic, jaundiced or pale.      Findings: No rash.      Nails: There is no clubbing.   Neurological:      Mental Status: She is alert and oriented to person, place, and time.      Cranial Nerves: No dysarthria or facial asymmetry.      Sensory: No sensory deficit.      Motor: No tremor.      Coordination: Coordination normal.      Gait: Gait normal.   Psychiatric:         Attention and Perception: Attention normal.         Mood and Affect: Mood normal.         Speech: Speech normal.         Behavior: Behavior normal.          Thought Content: Thought content normal.             Assessment and Plan   Diagnoses and all orders for this visit:    1. Chronic seasonal allergic rhinitis  Continue current medication  Encouraged to report if any worse or if any new symptoms or concerns.    2. Mixed hyperlipidemia  Encouraged to continue to work on her diet and exercise plan.  Updated labs drawn.  -     Comprehensive Metabolic Panel  -     Lipid Panel  -     TSH    3. Essential hypertension   Hypertension:  BP marginal off medication . Evidence of target organ damage: none.   As above  Will continue to monitor  -     Comprehensive Metabolic Panel  -     TSH    4. Vitamin D deficiency  Continue supplementation with monitoring.  -     Vitamin D,25-Hydroxy    5. B12 deficiency  As above  -     CBC & Differential  -     Vitamin B12    6. Gastroesophageal reflux disease without esophagitis   Symptoms are currently stable.  Continue current medication.  -     CBC & Differential    7. Chronic diarrhea  Patient remains uninterested in a GI assessment  Encouraged to report if any worse or if any new symptoms or concerns.  -     CBC & Differential    8. Healthcare maintenance  Recommended an updated COVID-19 vaccination.  Recommended RSV vaccination with her flu shot this fall  She remains uninterested in a mammogram    9. Recurrent major depressive disorder, in partial remission  Significant situational component.   Supportive therapy.   Encouraged to report if any worse or if any new symptoms or concerns.  -     TSH    10. Osteopenia of multiple sites    11. Mechanical low back pain    12. History of traumatic subdural hematoma    13. Generalized osteoarthritis  Reminded regarding symptomatic treatment.   Continue current medication  Encouraged to report if any worse or if any new symptoms or concerns.  -     traMADol (ULTRAM) 50 MG tablet; Take 1 tablet by mouth 4 (Four) Times a Day.  Dispense: 120 tablet; Refill: 2    14. Migraine without aura and  without status migrainosus, not intractable  Reviewed options and agreed on a reintroduction of topiramate 25 nightly titrating to 50 twice daily over the next month  Encouraged to report if any worse, any new symptoms, or if no better over the next few months  -     CBC & Differential  -     topiramate (TOPAMAX) 25 MG tablet; 1 po qpm for two weeks, then 2 po qpm for 2 weeks, then 2 po bid afterward  Dispense: 360 tablet; Refill: 0    15. Moderate persistent extrinsic asthma without complication  Moderate persistent asthma. The patient is not currently having an exacerbation. In general, the patient's disease is fairly well controlled.  Discussed distinction between quick-relief and controlled medications.  Patient uninterested in the reintroduction of an ICS at present but will consider  Encouraged to report if any worse or if any new symptoms or concerns.  -     CBC & Differential       Follow Up   Return in about 6 months (around 12/3/2024).  Patient was given instructions and counseling regarding her condition or for health maintenance advice. Please see specific information pulled into the AVS if appropriate.

## 2024-06-04 LAB
25(OH)D3 SERPL-MCNC: 27 NG/ML (ref 30–100)
ALBUMIN SERPL-MCNC: 4.4 G/DL (ref 3.5–5.2)
ALBUMIN/GLOB SERPL: 1.6 G/DL
ALP SERPL-CCNC: 84 U/L (ref 39–117)
ALT SERPL W P-5'-P-CCNC: 8 U/L (ref 1–33)
ANION GAP SERPL CALCULATED.3IONS-SCNC: 8 MMOL/L (ref 5–15)
AST SERPL-CCNC: 15 U/L (ref 1–32)
BASOPHILS # BLD AUTO: 0.05 10*3/MM3 (ref 0–0.2)
BASOPHILS NFR BLD AUTO: 1 % (ref 0–1.5)
BILIRUB SERPL-MCNC: 0.8 MG/DL (ref 0–1.2)
BUN SERPL-MCNC: 12 MG/DL (ref 8–23)
BUN/CREAT SERPL: 12.2 (ref 7–25)
CALCIUM SPEC-SCNC: 9.8 MG/DL (ref 8.6–10.5)
CHLORIDE SERPL-SCNC: 106 MMOL/L (ref 98–107)
CHOLEST SERPL-MCNC: 226 MG/DL (ref 0–200)
CO2 SERPL-SCNC: 27 MMOL/L (ref 22–29)
CREAT SERPL-MCNC: 0.98 MG/DL (ref 0.57–1)
DEPRECATED RDW RBC AUTO: 45.4 FL (ref 37–54)
EGFRCR SERPLBLD CKD-EPI 2021: 59.2 ML/MIN/1.73
EOSINOPHIL # BLD AUTO: 0.14 10*3/MM3 (ref 0–0.4)
EOSINOPHIL NFR BLD AUTO: 2.7 % (ref 0.3–6.2)
ERYTHROCYTE [DISTWIDTH] IN BLOOD BY AUTOMATED COUNT: 13.6 % (ref 12.3–15.4)
GLOBULIN UR ELPH-MCNC: 2.8 GM/DL
GLUCOSE SERPL-MCNC: 79 MG/DL (ref 65–99)
HCT VFR BLD AUTO: 38.9 % (ref 34–46.6)
HDLC SERPL-MCNC: 60 MG/DL (ref 40–60)
HGB BLD-MCNC: 12.8 G/DL (ref 12–15.9)
IMM GRANULOCYTES # BLD AUTO: 0.02 10*3/MM3 (ref 0–0.05)
IMM GRANULOCYTES NFR BLD AUTO: 0.4 % (ref 0–0.5)
LDLC SERPL CALC-MCNC: 144 MG/DL (ref 0–100)
LDLC/HDLC SERPL: 2.36 {RATIO}
LYMPHOCYTES # BLD AUTO: 1.92 10*3/MM3 (ref 0.7–3.1)
LYMPHOCYTES NFR BLD AUTO: 36.8 % (ref 19.6–45.3)
MCH RBC QN AUTO: 29.9 PG (ref 26.6–33)
MCHC RBC AUTO-ENTMCNC: 32.9 G/DL (ref 31.5–35.7)
MCV RBC AUTO: 90.9 FL (ref 79–97)
MONOCYTES # BLD AUTO: 0.34 10*3/MM3 (ref 0.1–0.9)
MONOCYTES NFR BLD AUTO: 6.5 % (ref 5–12)
NEUTROPHILS NFR BLD AUTO: 2.75 10*3/MM3 (ref 1.7–7)
NEUTROPHILS NFR BLD AUTO: 52.6 % (ref 42.7–76)
NRBC BLD AUTO-RTO: 0 /100 WBC (ref 0–0.2)
PLATELET # BLD AUTO: 241 10*3/MM3 (ref 140–450)
PMV BLD AUTO: 11 FL (ref 6–12)
POTASSIUM SERPL-SCNC: 4.3 MMOL/L (ref 3.5–5.2)
PROT SERPL-MCNC: 7.2 G/DL (ref 6–8.5)
RBC # BLD AUTO: 4.28 10*6/MM3 (ref 3.77–5.28)
SODIUM SERPL-SCNC: 141 MMOL/L (ref 136–145)
TRIGL SERPL-MCNC: 122 MG/DL (ref 0–150)
TSH SERPL DL<=0.05 MIU/L-ACNC: 2.95 UIU/ML (ref 0.27–4.2)
VIT B12 BLD-MCNC: 387 PG/ML (ref 211–946)
VLDLC SERPL-MCNC: 22 MG/DL (ref 5–40)
WBC NRBC COR # BLD AUTO: 5.22 10*3/MM3 (ref 3.4–10.8)

## 2024-11-04 DIAGNOSIS — J30.2 CHRONIC SEASONAL ALLERGIC RHINITIS: ICD-10-CM

## 2024-11-04 DIAGNOSIS — J45.40 MODERATE PERSISTENT EXTRINSIC ASTHMA WITHOUT COMPLICATION: ICD-10-CM

## 2024-11-04 RX ORDER — MONTELUKAST SODIUM 10 MG/1
10 TABLET ORAL DAILY
Qty: 90 TABLET | Refills: 3 | Status: SHIPPED | OUTPATIENT
Start: 2024-11-04

## 2024-11-04 RX ORDER — LEVOCETIRIZINE DIHYDROCHLORIDE 5 MG/1
5 TABLET, FILM COATED ORAL DAILY
Qty: 90 TABLET | Refills: 3 | Status: SHIPPED | OUTPATIENT
Start: 2024-11-04

## 2024-11-26 DIAGNOSIS — M15.9 GENERALIZED OSTEOARTHRITIS: ICD-10-CM

## 2024-11-26 RX ORDER — TRAMADOL HYDROCHLORIDE 50 MG/1
50 TABLET ORAL 4 TIMES DAILY
Qty: 120 TABLET | Refills: 2 | Status: SHIPPED | OUTPATIENT
Start: 2024-11-26

## 2025-01-21 ENCOUNTER — TELEMEDICINE (OUTPATIENT)
Dept: FAMILY MEDICINE CLINIC | Facility: CLINIC | Age: 80
End: 2025-01-21
Payer: MEDICARE

## 2025-01-21 DIAGNOSIS — F33.41 RECURRENT MAJOR DEPRESSIVE DISORDER, IN PARTIAL REMISSION: ICD-10-CM

## 2025-01-21 DIAGNOSIS — E55.9 VITAMIN D DEFICIENCY: ICD-10-CM

## 2025-01-21 DIAGNOSIS — M54.59 MECHANICAL LOW BACK PAIN: ICD-10-CM

## 2025-01-21 DIAGNOSIS — K21.9 GASTROESOPHAGEAL REFLUX DISEASE WITHOUT ESOPHAGITIS: ICD-10-CM

## 2025-01-21 DIAGNOSIS — E53.8 B12 DEFICIENCY: ICD-10-CM

## 2025-01-21 DIAGNOSIS — Z00.00 HEALTHCARE MAINTENANCE: ICD-10-CM

## 2025-01-21 DIAGNOSIS — I10 ESSENTIAL HYPERTENSION: ICD-10-CM

## 2025-01-21 DIAGNOSIS — G43.009 MIGRAINE WITHOUT AURA AND WITHOUT STATUS MIGRAINOSUS, NOT INTRACTABLE: ICD-10-CM

## 2025-01-21 DIAGNOSIS — M85.89 OSTEOPENIA OF MULTIPLE SITES: ICD-10-CM

## 2025-01-21 DIAGNOSIS — M15.9 GENERALIZED OSTEOARTHRITIS: ICD-10-CM

## 2025-01-21 DIAGNOSIS — J30.2 CHRONIC SEASONAL ALLERGIC RHINITIS: Primary | ICD-10-CM

## 2025-01-21 DIAGNOSIS — E78.2 MIXED HYPERLIPIDEMIA: ICD-10-CM

## 2025-01-21 DIAGNOSIS — J45.40 MODERATE PERSISTENT EXTRINSIC ASTHMA WITHOUT COMPLICATION: ICD-10-CM

## 2025-01-21 DIAGNOSIS — K52.9 CHRONIC DIARRHEA: ICD-10-CM

## 2025-01-21 PROCEDURE — 99214 OFFICE O/P EST MOD 30 MIN: CPT | Performed by: GENERAL PRACTICE

## 2025-01-21 PROCEDURE — G2211 COMPLEX E/M VISIT ADD ON: HCPCS | Performed by: GENERAL PRACTICE

## 2025-01-21 NOTE — PROGRESS NOTES
Subjective   Farzana Hawk is a 79 y.o. female.     You have chosen to receive care through a telehealth visit.  Do you consent to use a video/audio connection for your medical care today? Yes    Patient was in her living room at home and I was in the office.    Chief Complaint  She returns for a scheduled reassessment of multiple medical problems including asthma, essential hypertension, gastroesophageal reflux disease, chronic diarrhea, migraine headache without aura, and osteoarthritis    History of Present Illness     Asthma  She admits to an intermittent dry cough with occasional mild shortness of breath. She continues to deny any chest pain or hemoptysis. She remains on montelukast and continues to use her rescue inhaler once or twice weekly.  She has a history of chronic allergic rhinitis for which she is also taking levocetirizine    Essential Hypertension  She admits to occasional mild shortness of breath.  She continues to deny any chest pain, palpitations, orthopnea, paroxysmal nocturnal dyspnea or peripheral edema.  She is on no antihypertensive therapy at present  Lab Results   Component Value Date    GLUCOSE 79 06/03/2024    BUN 12 06/03/2024    CREATININE 0.98 06/03/2024     06/03/2024    K 4.3 06/03/2024     06/03/2024    CALCIUM 9.8 06/03/2024    PROTEINTOT 7.2 06/03/2024    ALBUMIN 4.4 06/03/2024    ALT 8 06/03/2024    AST 15 06/03/2024    ALKPHOS 84 06/03/2024    BILITOT 0.8 06/03/2024    GLOB 2.8 06/03/2024    AGRATIO 1.6 06/03/2024    BCR 12.2 06/03/2024    ANIONGAP 8.0 06/03/2024    EGFR 59.2 (L) 06/03/2024     Lab Results   Component Value Date    CHOL 226 (H) 06/03/2024    TRIG 122 06/03/2024    HDL 60 06/03/2024     (H) 06/03/2024     Gastroesophageal Reflux Disease  She remains heartburn free on lansoprazole.  She continues to deny any difficulty swallowing, nausea, or vomiting.  Lab Results   Component Value Date    WBC 5.22 06/03/2024    HGB 12.8 06/03/2024    HCT  38.9 06/03/2024    MCV 90.9 06/03/2024     06/03/2024     Chronic Diarrhea  She gives a more than 5-year history of intermittent diarrhea.  This is occasionally explosive and is frequently associated with mild cramping.  There is no history of any nausea, vomiting, hematochezia, or melena and she continues to deny any fever, chills, or night sweats.  She has occasional episodes at night but the vast majority are during the day and remain unpredictable.  She has been unable to identify any exacerbating factors.  She remains uninterested in undergoing a GI opinion    Previous Left Subdural Hematoma  She was discharged from [facility] on 3/14/2020 after undergoing evacuation of a left subdural hematoma.  Her course in hospital was uncomplicated.  While she continues to have intermittent headaches these are no different then she has had in the past.  She continues to deny any problems with her speech, vision, strength, or sensation.  She denies any recent falls or close calls    Migraine Headache  She has a long history of intermittent headaches.  These are currently occurring once or twice weekly and generally lasts until she takes excedrin and/or tramadol.  She denies any warning or associated symptoms.      Osteoarthritis  She gives a long history of generalized joint pain associated with mild stiffness.  She has had intermittent right hip pain over the last few weeks.  She denies any strain or trauma and has had no change in her activities.  She feels that it is improving some.  She continues to deny any joint warmth or swelling.  With tramadol 50 4 times daily she is able to perform her ADLs, walks him, and generally sleep    Labs  Most recent vitamin D 27  Lab Results   Component Value Date    [id number] 387 06/03/2024     Lab Results   Component Value Date    TSH 2.950 06/03/2024     The following portions of the patient's history were reviewed and updated as appropriate: allergies, current medications, past  medical history, past social history, and problem list.    Review of Systems   Constitutional:  Positive for fatigue. Negative for chills and fever.   HENT:  Negative for congestion, ear pain, hearing loss, postnasal drip, rhinorrhea, sinus pressure, sneezing, sore throat and voice change.    Eyes:  Negative for visual disturbance.   Respiratory:  Positive for cough and shortness of breath. Negative for wheezing.    Cardiovascular:  Negative for chest pain, palpitations and leg swelling.   Gastrointestinal:  Positive for diarrhea. Negative for abdominal pain, blood in stool, constipation, nausea, vomiting and GERD.   Genitourinary:  Negative for dysuria, frequency, hematuria and urgency.   Musculoskeletal:  Positive for arthralgias and back pain. Negative for joint swelling and myalgias.   Skin:  Negative for rash.   Neurological:  Negative for weakness and numbness.   Psychiatric/Behavioral:  Negative for decreased concentration, hallucinations, sleep disturbance and depressed mood. The patient is nervous/anxious.      Objective   Physical Exam  Constitutional:       Comments: Bright and in good spirits. No apparent distress. No pallor, jaundice, diaphoresis, or cyanosis.   Pulmonary:      Comments: Normal respiratory effort.  No cough or audible wheezing  Skin:     Coloration: Skin is not cyanotic, jaundiced or pale.   Neurological:      Mental Status: She is oriented to person, place, and time.      Cranial Nerves: No dysarthria or facial asymmetry.   Psychiatric:         Attention and Perception: Attention normal.         Mood and Affect: Mood normal.         Speech: Speech normal.         Behavior: Behavior normal.         Thought Content: Thought content normal.       Assessment & Plan   Problems Addressed this Visit          Allergies and Adverse Reactions    Chronic seasonal allergic rhinitis   Continue current medication  Encouraged to report if any worse or if any new symptoms or concerns.       Cardiac  and Vasculature    Essential hypertension  Encouraged to continue to work on her diet and exercise plan.    Mixed hyperlipidemia  As above.  Updated labs will be drawn at her return.       Endocrine and Metabolic    B12 deficiency  Continue supplementation with monitoring.    Vitamin D deficiency  As above.       Gastrointestinal Abdominal     Chronic diarrhea  Encouraged to report if any worse or if any new symptoms or concerns.    Gastroesophageal reflux disease without esophagitis  Continue current medication  Encouraged to report if any worse or if any new symptoms or concerns.       Health Encounters    Healthcare maintenance  Patient is uncertain whether she received a flu shot but will check with her pharmacy  Recommended RSV and an updated COVID-19 shot  She remains uninterested in undergoing further breast or colon cancer screening       Mental Health    Recurrent major depressive disorder, in partial remission  Stable.  Supportive therapy.   Encouraged to report if any worse or if any new symptoms or concerns.       Musculoskeletal and Injuries    Generalized osteoarthritis  Reminded regarding symptomatic treatment.   Continue current medication  Encouraged to report if any worse or if any new symptoms or concerns.    Relevant Medications    traMADol (ULTRAM) 50 MG tablet    Mechanical low back pain  As above.    Osteopenia of multiple sites  Encouraged to continue to pursue weight bearing activities while exercising joint protection.  Will consider an updated DEXA scan next year       Neuro    Migraine without aura and without status migrainosus, not intractable     Chronic.  Stable.   Encouraged to report if this should change.           Pulmonary and Pneumonias    Extrinsic asthma without complication  Mild persistent asthma. The patient is not currently having an exacerbation. In general, the patient's disease is well controlled.  Encouraged to report if this should change.  Continue current medication      Diagnoses         Codes Comments    Chronic seasonal allergic rhinitis    -  Primary ICD-10-CM: J30.2  ICD-9-CM: 477.8     Mixed hyperlipidemia     ICD-10-CM: E78.2  ICD-9-CM: 272.2     Essential hypertension     ICD-10-CM: I10  ICD-9-CM: 401.9     Vitamin D deficiency     ICD-10-CM: E55.9  ICD-9-CM: 268.9     Gastroesophageal reflux disease without esophagitis     ICD-10-CM: K21.9  ICD-9-CM: 530.81     B12 deficiency     ICD-10-CM: E53.8  ICD-9-CM: 266.2     Chronic diarrhea     ICD-10-CM: K52.9  ICD-9-CM: 787.91     Healthcare maintenance     ICD-10-CM: Z00.00  ICD-9-CM: V70.0     Recurrent major depressive disorder, in partial remission     ICD-10-CM: F33.41  ICD-9-CM: 296.35     Osteopenia of multiple sites     ICD-10-CM: M85.89  ICD-9-CM: 733.90     Mechanical low back pain     ICD-10-CM: M54.59  ICD-9-CM: 724.2     Generalized osteoarthritis     ICD-10-CM: M15.9  ICD-9-CM: 715.00     Migraine without aura and without status migrainosus, not intractable     ICD-10-CM: G43.009  ICD-9-CM: 346.10     Moderate persistent extrinsic asthma without complication     ICD-10-CM: J45.40  ICD-9-CM: 493.00

## 2025-01-22 RX ORDER — TRAMADOL HYDROCHLORIDE 50 MG/1
50 TABLET ORAL 4 TIMES DAILY
Qty: 120 TABLET | Refills: 3 | Status: SHIPPED | OUTPATIENT
Start: 2025-01-22

## 2025-03-06 ENCOUNTER — OFFICE VISIT (OUTPATIENT)
Dept: FAMILY MEDICINE CLINIC | Facility: CLINIC | Age: 80
End: 2025-03-06
Payer: MEDICARE

## 2025-03-06 VITALS
OXYGEN SATURATION: 95 % | DIASTOLIC BLOOD PRESSURE: 82 MMHG | HEIGHT: 62 IN | TEMPERATURE: 98.2 F | SYSTOLIC BLOOD PRESSURE: 130 MMHG | BODY MASS INDEX: 24.33 KG/M2 | WEIGHT: 132.2 LBS | HEART RATE: 92 BPM

## 2025-03-06 DIAGNOSIS — M54.2 NECK PAIN: ICD-10-CM

## 2025-03-06 DIAGNOSIS — J06.9 VIRAL URI WITH COUGH: Primary | ICD-10-CM

## 2025-03-06 DIAGNOSIS — R68.89 FLU-LIKE SYMPTOMS: ICD-10-CM

## 2025-03-06 LAB
EXPIRATION DATE: NORMAL
FLUAV AG NPH QL: NEGATIVE
FLUAV RNA RESP QL NAA+PROBE: DETECTED
FLUBV AG NPH QL: NEGATIVE
FLUBV RNA RESP QL NAA+PROBE: NOT DETECTED
INTERNAL CONTROL: NORMAL
Lab: NORMAL
RSV RNA RESP QL NAA+PROBE: NOT DETECTED
SARS-COV-2 RNA RESP QL NAA+PROBE: NOT DETECTED

## 2025-03-06 PROCEDURE — 87637 SARSCOV2&INF A&B&RSV AMP PRB: CPT | Performed by: PHYSICIAN ASSISTANT

## 2025-03-06 RX ORDER — OSELTAMIVIR PHOSPHATE 75 MG/1
75 CAPSULE ORAL 2 TIMES DAILY
Qty: 10 CAPSULE | Refills: 0 | Status: SHIPPED | OUTPATIENT
Start: 2025-03-06 | End: 2025-03-11

## 2025-03-06 RX ORDER — BUDESONIDE, GLYCOPYRROLATE, AND FORMOTEROL FUMARATE 160; 9; 4.8 UG/1; UG/1; UG/1
2 AEROSOL, METERED RESPIRATORY (INHALATION) 2 TIMES DAILY
Qty: 28 EACH | Refills: 0 | COMMUNITY
Start: 2025-03-06

## 2025-03-06 RX ORDER — KETOROLAC TROMETHAMINE 30 MG/ML
30 INJECTION, SOLUTION INTRAMUSCULAR; INTRAVENOUS ONCE
Status: COMPLETED | OUTPATIENT
Start: 2025-03-06 | End: 2025-03-06

## 2025-03-06 RX ADMIN — KETOROLAC TROMETHAMINE 30 MG: 30 INJECTION, SOLUTION INTRAMUSCULAR; INTRAVENOUS at 11:01

## 2025-03-06 NOTE — PROGRESS NOTES
Subjective        Chief Complaint  Cough, Nasal Congestion, Shortness of Breath, and Fatigue    Subjective      Farzana Hawk is a 79 y.o. female who presents today to [facility] for Cough, Nasal Congestion, Shortness of Breath, and Fatigue.  She has a past medical history significant for hypertension, extrinsic asthma, GERD, arthritis, HTN, migraine headaches, osteoporosis, vitamin D deficiency. She also reports a remote history of meningitis.    Cough, Nasal Congestion, Shortness of Breath, and Fatigue  She reports about 4 to 5 days of feeling poorly with cough with sputum production, nasal congestion, runny nose, dyspnea, and generalized fatigue.  She has had fever off and on with the last one being last night at 101F.  She has also developed severe neck pain and stiffness.  She has worsening pain with lateral movement of the neck.  Denies chronic neck or back pain.  She does report that with her illness, she has been laying in the bed more than what she usually does and this may have contributed to her neck pain and stiffness.  She has had off-and-on headache, currently mild, but was severe last night with a fever.  Denies any rash.  No confusion or altered mental status.  Has not appreciated any sound sensitivity.  She may have experienced some light sensitivity, however, her glasses are broken she cannot see normally without them.    She has been on amoxicillin for a dental infection and finished these today.       Current Outpatient Medications:     albuterol sulfate  (90 Base) MCG/ACT inhaler, Inhale 2 puffs Every 4 (Four) Hours As Needed for Wheezing., Disp: 54 g, Rfl: 3    cholecalciferol (Vitamin D) 25 MCG (1000 UT) tablet, Take 2 tablets by mouth Daily., Disp: 60 tablet, Rfl: 5    lansoprazole (PREVACID) 30 MG capsule, Take 1 capsule by mouth Daily., Disp: 90 capsule, Rfl: 3    levocetirizine (XYZAL) 5 MG tablet, TAKE 1 TABLET DAILY, Disp: 90 tablet, Rfl:  "3    montelukast (SINGULAIR) 10 MG tablet, TAKE 1 TABLET DAILY, Disp: 90 tablet, Rfl: 3    traMADol (ULTRAM) 50 MG tablet, Take 1 tablet by mouth 4 (Four) Times a Day., Disp: 120 tablet, Rfl: 3    vitamin B-12 (CYANOCOBALAMIN) 1000 MCG tablet, Take 1 tablet by mouth Daily., Disp: 30 tablet, Rfl: 5    Budeson-Glycopyrrol-Formoterol (Breztri Aerosphere) 160-9-4.8 MCG/ACT aerosol inhaler, Inhale 2 puffs 2 (Two) Times a Day. Lot [id number] Exp 09/2027, Disp: 28 each, Rfl: 0    Current Facility-Administered Medications:     ketorolac (TORADOL) injection 30 mg, 30 mg, Intramuscular, Once, Leonela Watson PA      No Known Allergies    Objective     Objective   Vital Signs:  /82 (BP Location: Right arm, Patient Position: Sitting, Cuff Size: Adult)   Pulse 92   Temp 98.2 °F (36.8 °C) (Oral)   Ht 157.5 cm (62.01\")   Wt 60 kg (132 lb 3.2 oz)   SpO2 95%   BMI 24.17 kg/m²   Estimated body mass index is 24.17 kg/m² as calculated from the following:    Height as of this encounter: 157.5 cm (62.01\").    Weight as of this encounter: 60 kg (132 lb 3.2 oz).    BMI is within normal parameters. No other follow-up for BMI required.    Past Medical History:   Diagnosis Date    Cervicalgia     Chronic allergic rhinitis 8/1/2016    Depression 8/1/2016    Essential hypertension 8/1/2016    Extrinsic asthma without complication 8/1/2016    Gastroesophageal reflux disease without esophagitis 8/1/2016    Generalized osteoarthritis 8/1/2016    Hyperlipidemia 8/1/2016    Mechanical low back pain     Migraine without aura 8/1/2016    Postmenopausal osteoporosis 8/1/2016    Vitamin D deficiency 8/1/2016     Past Surgical History:   Procedure Laterality Date    TOTAL ABDOMINAL HYSTERECTOMY WITH SALPINGO OOPHORECTOMY       Social History     Socioeconomic History    Marital status:    Tobacco Use    Smoking status: Never     Passive exposure: Never    Smokeless tobacco: Never   Vaping Use    Vaping status: Never Used "   Substance and Sexual Activity    Alcohol use: No    Drug use: No    Sexual activity: Defer      Physical Exam  Vitals and nursing note reviewed.   Constitutional:       General: She is not in acute distress.     Appearance: She is well-developed. She is ill-appearing. She is not toxic-appearing or diaphoretic.   HENT:      Head: Normocephalic and atraumatic.      Right Ear: Tympanic membrane normal.      Left Ear: Tympanic membrane normal.      Mouth/Throat:      Pharynx: No posterior oropharyngeal erythema.   Eyes:      General: No scleral icterus.        Right eye: No discharge.         Left eye: No discharge.      Conjunctiva/sclera: Conjunctivae normal.   Cardiovascular:      Rate and Rhythm: Normal rate and regular rhythm.      Heart sounds: Normal heart sounds. No murmur heard.     No friction rub. No gallop.   Pulmonary:      Effort: Pulmonary effort is normal. No respiratory distress.      Breath sounds: Normal breath sounds. No wheezing or rales.   Chest:      Chest wall: No tenderness.   Musculoskeletal:         General: Normal range of motion.      Cervical back: Normal range of motion and neck supple. Rigidity and tenderness present.      Right lower leg: No edema.      Left lower leg: No edema.      Comments: Tension and muscle tenderness in the bilateral trapezius.   Lymphadenopathy:      Cervical: No cervical adenopathy.   Skin:     General: Skin is warm and dry.      Coloration: Skin is not pale.      Findings: No erythema or rash.   Neurological:      Mental Status: She is alert and oriented to person, place, and time. Mental status is at baseline.      Gait: Gait normal.      Comments: Negative Kernig's sign. Brudzuski's test does illicit pain in the low back, but no flexion of the hips.    Psychiatric:         Behavior: Behavior normal.        Result Review :  The following data was reviewed by: REDDY Bailey on 03/06/2025:  TSH   Date Value Ref Range Status   06/03/2024 2.950 0.270 -  4.200 uIU/mL Final     HDL Cholesterol   Date Value Ref Range Status   06/03/2024 60 40 - 60 mg/dL Final     LDL Cholesterol    Date Value Ref Range Status   06/03/2024 144 (H) 0 - 100 mg/dL Final     Triglycerides   Date Value Ref Range Status   06/03/2024 122 0 - 150 mg/dL Final           Assessment / Plan         Assessment   Diagnoses and all orders for this visit:    1. Viral URI with cough (Primary)  2. Flu-like symptoms  3. Neck pain  -Rapid flu negative.   -Testing sent for COVID/FLU/RSV. Will notify of results when available.   -She reports her neck pain is similar to when she had meningitis remotely.  Discussed risks of meningitis and that ultimately a lumbar puncture would be required for diagnosis.  Discussed option of going to the ER to discuss having this completed.  She declined for now.  We discussed concerning signs and symptoms to monitor for such as recurrent or persistent fever, confusion/altered mental status, altered gait, light and sound sensitivity, and severe headache.  Encouraged her to go to the ER should any of these develop.  She expressed understanding and agreed.  -I did send her with a sample of Breztri inhaler, 2 puffs twice daily, rinse mouth after use.  -Continue daily antihistamine at home nasal spray.  -She did receive a dose of ketorolac 30 mg x 1 in the office today.  Continue home Ultram as well as heat and stretching to help with neck pain and stiffness.  -Offered a low-dose of methocarbamol to see if this also helps, she declined for now.  -Return to clinic if no improvement noted or if symptoms are worsening.   -     POC Influenza A / B  -     COVID-19, FLU A/B, RSV PCR 1 HR TAT - Swab, Nasopharynx  -     Budeson-Glycopyrrol-Formoterol (Breztri Aerosphere) 160-9-4.8 MCG/ACT aerosol inhaler; Inhale 2 puffs 2 (Two) Times a Day. Lot [id number]  Exp 09/2027  Dispense: 28 each; Refill: 0  -     ketorolac (TORADOL) injection 30 mg      I spent 38 minutes caring for Farzana JIMENEZ  Carine on this date of service. This time includes time spent by me in the following activities: preparing for the visit, reviewing tests, obtaining and/or reviewing a separately obtained history, performing a medically appropriate examination and/or evaluation , counseling and educating the patient/family/caregiver, ordering medications, tests, or procedures and documenting information in the medical record.          New Medications Ordered This Visit   Medications    Budeson-Glycopyrrol-Formoterol (Breztri Aerosphere) 160-9-4.8 MCG/ACT aerosol inhaler     Sig: Inhale 2 puffs 2 (Two) Times a Day. Lot [id number]  Exp 09/2027     Dispense:  28 each     Refill:  0    ketorolac (TORADOL) injection 30 mg     Follow Up   Return if symptoms worsen or fail to improve, for Follow up with PCP as scheduled.    Patient was given instructions and counseling regarding her condition or for health maintenance advice. Please see specific information pulled into the AVS if appropriate.       This document has been electronically signed by REDDY Bailey   March 6, 2025 10:53 EST    Dictated Utilizing Dragon Dictation: Part of this note may be an electronic transcription/translation of spoken language to printed text using the Dragon Dictation System.

## 2025-03-07 ENCOUNTER — TELEPHONE (OUTPATIENT)
Dept: FAMILY MEDICINE CLINIC | Facility: CLINIC | Age: 80
End: 2025-03-07
Payer: MEDICARE

## 2025-03-07 ENCOUNTER — CLINICAL SUPPORT (OUTPATIENT)
Dept: FAMILY MEDICINE CLINIC | Facility: CLINIC | Age: 80
End: 2025-03-07
Payer: MEDICARE

## 2025-03-07 DIAGNOSIS — R68.89 FLU-LIKE SYMPTOMS: Primary | ICD-10-CM

## 2025-03-07 PROCEDURE — 96372 THER/PROPH/DIAG INJ SC/IM: CPT | Performed by: GENERAL PRACTICE

## 2025-03-07 RX ORDER — KETOROLAC TROMETHAMINE 30 MG/ML
60 INJECTION, SOLUTION INTRAMUSCULAR; INTRAVENOUS ONCE
Status: COMPLETED | OUTPATIENT
Start: 2025-03-07 | End: 2025-03-07

## 2025-03-07 RX ADMIN — KETOROLAC TROMETHAMINE 60 MG: 30 INJECTION, SOLUTION INTRAMUSCULAR; INTRAVENOUS at 10:44

## 2025-03-07 NOTE — TELEPHONE ENCOUNTER
----- Message from Luis Sim sent at 3/7/2025  9:17 AM EST -----  Sure  ----- Message -----  From: Suzanne Santamaria MA  Sent: 3/7/2025   8:20 AM EST  To: Luis Sim MD    Pt has the flu and she came yesterday and Leonela gave her a toradol, can she have another today? She has having awful body aches.

## 2025-06-04 ENCOUNTER — TELEPHONE (OUTPATIENT)
Dept: FAMILY MEDICINE CLINIC | Facility: CLINIC | Age: 80
End: 2025-06-04
Payer: MEDICARE

## 2025-06-04 DIAGNOSIS — I10 ESSENTIAL HYPERTENSION: Primary | ICD-10-CM

## 2025-06-04 RX ORDER — VALSARTAN 80 MG/1
80 TABLET ORAL DAILY
Qty: 90 TABLET | Refills: 3 | Status: SHIPPED | OUTPATIENT
Start: 2025-06-04 | End: 2025-06-04 | Stop reason: SDUPTHER

## 2025-06-04 RX ORDER — VALSARTAN 80 MG/1
80 TABLET ORAL DAILY
Qty: 90 TABLET | Refills: 3 | Status: SHIPPED | OUTPATIENT
Start: 2025-06-04

## 2025-06-04 NOTE — TELEPHONE ENCOUNTER
----- Message from Luis Sim sent at 6/4/2025 11:44 AM EDT -----  Yes - emailed to both  ----- Message -----  From: Kassy Jeffery RegSched Rep  Sent: 6/4/2025  11:42 AM EDT  To: Luis Sim MD    Can a short term supply be sent to [facility] please?  ----- Message -----  From: Luis Sim MD  Sent: 6/4/2025  11:35 AM EDT  To: Quirino Butcher    Emailed  Thanks  ----- Message -----  From: Kassy Jeffery RegSched Rep  Sent: 6/4/2025  11:23 AM EDT  To: Luis Sim MD    PATIENT STATES SHE HASN'T HAD HER BP MED IN 3 DAYS- DIOV[facility].   NEEDS REFILL SENT IN TO LOCAL PHARMACY AND THEN ANOTHER SCRIPT SENT TO [facility].     PLEASE ADVISE

## 2025-07-15 ENCOUNTER — OFFICE VISIT (OUTPATIENT)
Dept: FAMILY MEDICINE CLINIC | Facility: CLINIC | Age: 80
End: 2025-07-15
Payer: MEDICARE

## 2025-07-15 DIAGNOSIS — M15.9 GENERALIZED OSTEOARTHRITIS: ICD-10-CM

## 2025-07-15 DIAGNOSIS — J45.40 MODERATE PERSISTENT EXTRINSIC ASTHMA WITHOUT COMPLICATION: ICD-10-CM

## 2025-07-15 DIAGNOSIS — I10 ESSENTIAL HYPERTENSION: ICD-10-CM

## 2025-07-15 DIAGNOSIS — E53.8 B12 DEFICIENCY: ICD-10-CM

## 2025-07-15 DIAGNOSIS — E78.2 MIXED HYPERLIPIDEMIA: Primary | ICD-10-CM

## 2025-07-15 DIAGNOSIS — E55.9 VITAMIN D DEFICIENCY: ICD-10-CM

## 2025-07-15 DIAGNOSIS — G43.009 MIGRAINE WITHOUT AURA AND WITHOUT STATUS MIGRAINOSUS, NOT INTRACTABLE: ICD-10-CM

## 2025-07-15 DIAGNOSIS — Z87.828 HISTORY OF TRAUMATIC SUBDURAL HEMATOMA: ICD-10-CM

## 2025-07-15 DIAGNOSIS — Z00.00 HEALTHCARE MAINTENANCE: ICD-10-CM

## 2025-07-15 DIAGNOSIS — R07.89 OTHER CHEST PAIN: ICD-10-CM

## 2025-07-15 DIAGNOSIS — K52.9 CHRONIC DIARRHEA: ICD-10-CM

## 2025-07-15 DIAGNOSIS — K21.9 GASTROESOPHAGEAL REFLUX DISEASE WITHOUT ESOPHAGITIS: ICD-10-CM

## 2025-07-15 DIAGNOSIS — J30.2 CHRONIC SEASONAL ALLERGIC RHINITIS: ICD-10-CM

## 2025-07-15 LAB
ALBUMIN SERPL-MCNC: 4.1 G/DL (ref 3.5–5.2)
ALBUMIN/GLOB SERPL: 1.4 G/DL
ALP SERPL-CCNC: 77 U/L (ref 39–117)
ALT SERPL W P-5'-P-CCNC: 6 U/L (ref 1–33)
ANION GAP SERPL CALCULATED.3IONS-SCNC: 11.1 MMOL/L (ref 5–15)
AST SERPL-CCNC: 16 U/L (ref 1–32)
BASOPHILS # BLD AUTO: 0.06 10*3/MM3 (ref 0–0.2)
BASOPHILS NFR BLD AUTO: 1 % (ref 0–1.5)
BILIRUB SERPL-MCNC: 0.7 MG/DL (ref 0–1.2)
BUN SERPL-MCNC: 11.4 MG/DL (ref 8–23)
BUN/CREAT SERPL: 14.8 (ref 7–25)
CALCIUM SPEC-SCNC: 9.3 MG/DL (ref 8.6–10.5)
CHLORIDE SERPL-SCNC: 103 MMOL/L (ref 98–107)
CHOLEST SERPL-MCNC: 211 MG/DL (ref 0–200)
CO2 SERPL-SCNC: 25.9 MMOL/L (ref 22–29)
CREAT SERPL-MCNC: 0.77 MG/DL (ref 0.57–1)
DEPRECATED RDW RBC AUTO: 42.1 FL (ref 37–54)
EGFRCR SERPLBLD CKD-EPI 2021: 78.6 ML/MIN/1.73
EOSINOPHIL # BLD AUTO: 0.19 10*3/MM3 (ref 0–0.4)
EOSINOPHIL NFR BLD AUTO: 3.2 % (ref 0.3–6.2)
ERYTHROCYTE [DISTWIDTH] IN BLOOD BY AUTOMATED COUNT: 12.7 % (ref 12.3–15.4)
GLOBULIN UR ELPH-MCNC: 2.9 GM/DL
GLUCOSE SERPL-MCNC: 88 MG/DL (ref 65–99)
HCT VFR BLD AUTO: 37.6 % (ref 34–46.6)
HDLC SERPL-MCNC: 64 MG/DL (ref 40–60)
HGB BLD-MCNC: 12.4 G/DL (ref 12–15.9)
IMM GRANULOCYTES # BLD AUTO: 0.02 10*3/MM3 (ref 0–0.05)
IMM GRANULOCYTES NFR BLD AUTO: 0.3 % (ref 0–0.5)
LDLC SERPL CALC-MCNC: 128 MG/DL (ref 0–100)
LDLC/HDLC SERPL: 1.95 {RATIO}
LYMPHOCYTES # BLD AUTO: 1.58 10*3/MM3 (ref 0.7–3.1)
LYMPHOCYTES NFR BLD AUTO: 26.6 % (ref 19.6–45.3)
MCH RBC QN AUTO: 29.8 PG (ref 26.6–33)
MCHC RBC AUTO-ENTMCNC: 33 G/DL (ref 31.5–35.7)
MCV RBC AUTO: 90.4 FL (ref 79–97)
MONOCYTES # BLD AUTO: 0.37 10*3/MM3 (ref 0.1–0.9)
MONOCYTES NFR BLD AUTO: 6.2 % (ref 5–12)
NEUTROPHILS NFR BLD AUTO: 3.73 10*3/MM3 (ref 1.7–7)
NEUTROPHILS NFR BLD AUTO: 62.7 % (ref 42.7–76)
NRBC BLD AUTO-RTO: 0 /100 WBC (ref 0–0.2)
PLATELET # BLD AUTO: 220 10*3/MM3 (ref 140–450)
PMV BLD AUTO: 10.1 FL (ref 6–12)
POTASSIUM SERPL-SCNC: 3.8 MMOL/L (ref 3.5–5.2)
PROT SERPL-MCNC: 7 G/DL (ref 6–8.5)
RBC # BLD AUTO: 4.16 10*6/MM3 (ref 3.77–5.28)
SODIUM SERPL-SCNC: 140 MMOL/L (ref 136–145)
TRIGL SERPL-MCNC: 110 MG/DL (ref 0–150)
TSH SERPL DL<=0.05 MIU/L-ACNC: 2.71 UIU/ML (ref 0.27–4.2)
VLDLC SERPL-MCNC: 19 MG/DL (ref 5–40)
WBC NRBC COR # BLD AUTO: 5.95 10*3/MM3 (ref 3.4–10.8)

## 2025-07-15 PROCEDURE — 36415 COLL VENOUS BLD VENIPUNCTURE: CPT | Performed by: GENERAL PRACTICE

## 2025-07-15 PROCEDURE — 3077F SYST BP >= 140 MM HG: CPT | Performed by: GENERAL PRACTICE

## 2025-07-15 PROCEDURE — 99214 OFFICE O/P EST MOD 30 MIN: CPT | Performed by: GENERAL PRACTICE

## 2025-07-15 PROCEDURE — 3080F DIAST BP >= 90 MM HG: CPT | Performed by: GENERAL PRACTICE

## 2025-07-15 PROCEDURE — 85025 COMPLETE CBC W/AUTO DIFF WBC: CPT | Performed by: GENERAL PRACTICE

## 2025-07-15 PROCEDURE — 82306 VITAMIN D 25 HYDROXY: CPT | Performed by: GENERAL PRACTICE

## 2025-07-15 PROCEDURE — 80053 COMPREHEN METABOLIC PANEL: CPT | Performed by: GENERAL PRACTICE

## 2025-07-15 PROCEDURE — 84443 ASSAY THYROID STIM HORMONE: CPT | Performed by: GENERAL PRACTICE

## 2025-07-15 PROCEDURE — 93000 ELECTROCARDIOGRAM COMPLETE: CPT | Performed by: GENERAL PRACTICE

## 2025-07-15 PROCEDURE — 82607 VITAMIN B-12: CPT | Performed by: GENERAL PRACTICE

## 2025-07-15 PROCEDURE — 80061 LIPID PANEL: CPT | Performed by: GENERAL PRACTICE

## 2025-07-15 RX ORDER — VALSARTAN AND HYDROCHLOROTHIAZIDE 160; 25 MG/1; MG/1
1 TABLET ORAL DAILY
Qty: 90 TABLET | Refills: 3 | Status: SHIPPED | OUTPATIENT
Start: 2025-07-15

## 2025-07-15 RX ORDER — VALSARTAN AND HYDROCHLOROTHIAZIDE 160; 25 MG/1; MG/1
1 TABLET ORAL DAILY
Qty: 90 TABLET | Refills: 3 | Status: SHIPPED | OUTPATIENT
Start: 2025-07-15 | End: 2025-07-15 | Stop reason: SDUPTHER

## 2025-07-15 RX ORDER — TRAMADOL HYDROCHLORIDE 50 MG/1
50 TABLET ORAL 4 TIMES DAILY
Qty: 120 TABLET | Refills: 3 | Status: SHIPPED | OUTPATIENT
Start: 2025-07-15

## 2025-07-15 RX ORDER — ASPIRIN 81 MG/1
81 TABLET ORAL DAILY
Start: 2025-07-15

## 2025-07-15 NOTE — PROGRESS NOTES
Subjective   Farzana Hawk is a 80 y.o. female.     Chief Complaint  Chest pain    History of Present Illness     Chest Pain  Two days ago, she developed an abrupt onset of chest pain while at [employer].  She describes this as a sharp anterior pressure radiating to the right shoulder.  The pain was associated with shortness of breath, lightheadedness, diaphoresis, and nausea.  EMS was called, she was given 2 baby aspirin, her symptoms resolved after 15 to 20 minutes, and she declined ED evaluation.  She denies any other episodes, but has noted more shortness of breath with exertion over the last few months.  This has been associated with an increased cough and fatigue.  She denies any palpitations, orthopnea, PND, calf pain, swelling of the ankles, hemoptysis, fever, or chills.    Asthma  She has had increased shortness of breath and coughing over the last few months. She continues to deny any hemoptysis. She remains on montelukast and continues to use her rescue inhaler once or twice weekly.  She has a history of chronic allergic rhinitis for which she is also taking levocetirizine    Essential Hypertension  She is currently on valsartan 160 daily.  Her blood pressure has been elevated since her episode of chest pain 2 days ago    Gastroesophageal Reflux Disease  She remains heartburn free on lansoprazole.  She continues to deny any difficulty swallowing, nausea, or vomiting.    Chronic Diarrhea  She gives a more than 5-year history of intermittent diarrhea.  This is occasionally explosive and is frequently associated with mild cramping.  There is no history of any nausea, vomiting, hematochezia, or melena and she continues to deny any fever, chills, or night sweats.  She has occasional episodes at night but the vast majority are during the day and remain unpredictable.  She has been unable to identify any exacerbating factors.  She remains uninterested in undergoing a GI opinion    Previous Left Subdural  Hematoma  She was discharged from [facility] on 3/14/2020 after undergoing evacuation of a left subdural hematoma.  While she continues to have intermittent headaches these are no different then she has had in the past.  She continues to deny any problems with her speech, vision, strength, or sensation.  She denies any recent falls or close calls    Migraine Headache  She has a long history of intermittent headaches.  These are currently occurring once or twice weekly and generally last until she takes excedrin and/or tramadol.  She continues to do 9 any warning or associated symptoms.      Osteoarthritis  She gives a long history of generalized joint pain associated with mild stiffness.  She continues to deny any joint warmth or swelling.  With tramadol 50 4 times daily she is able to perform her ADLs, walks him, and generally sleep    The following portions of the patient's history were reviewed and updated as appropriate: allergies, current medications, past medical history, past social history, and problem list.    Review of Systems   Constitutional:  Positive for fatigue. Negative for chills and fever.   HENT:  Negative for congestion, ear pain, hearing loss, postnasal drip, rhinorrhea, sinus pressure, sneezing, sore throat and voice change.    Eyes:  Negative for visual disturbance.   Respiratory:  Positive for cough and shortness of breath. Negative for wheezing.    Cardiovascular:  Negative for chest pain, palpitations and leg swelling.   Gastrointestinal:  Positive for diarrhea. Negative for abdominal pain, blood in stool, constipation, nausea, vomiting and GERD.   Genitourinary:  Negative for dysuria, frequency, hematuria and urgency.   Musculoskeletal:  Positive for arthralgias and back pain. Negative for joint swelling and myalgias.   Skin:  Negative for rash.   Neurological:  Negative for weakness and numbness.   Psychiatric/Behavioral:  Negative for decreased concentration, hallucinations, sleep disturbance  and depressed mood. The patient is nervous/anxious.      Objective   Physical Exam  Constitutional:       General: She is not in acute distress.     Appearance: Normal appearance. She is well-developed. She is not diaphoretic.      Comments: Bright and in good spirits. No apparent distress. No pallor, jaundice, diaphoresis, or cyanosis.   HENT:      Head: Atraumatic.      Right Ear: Tympanic membrane, ear canal and external ear normal.      Left Ear: Tympanic membrane, ear canal and external ear normal.      Mouth/Throat:      Lips: No lesions.      Mouth: Mucous membranes are moist. No oral lesions.      Pharynx: No oropharyngeal exudate or posterior oropharyngeal erythema.   Eyes:      General: Lids are normal.      Extraocular Movements: Extraocular movements intact.      Conjunctiva/sclera: Conjunctivae normal.      Pupils: Pupils are equal.   Neck:      Thyroid: No thyroid mass or thyromegaly.      Vascular: No carotid bruit.      Trachea: Trachea normal. No tracheal deviation.   Cardiovascular:      Rate and Rhythm: Normal rate and regular rhythm.      Pulses:           Dorsalis pedis pulses are 2+ on the right side.        Posterior tibial pulses are 2+ on the right side and 2+ on the left side.      Heart sounds: Normal heart sounds, S1 normal and S2 normal. No murmur heard.     No gallop.      Comments: No calf tenderness  Pulmonary:      Effort: Pulmonary effort is normal.      Breath sounds: No wheezing.   Abdominal:      General: Bowel sounds are normal. There is no distension or abdominal bruit.      Palpations: Abdomen is soft. There is no hepatomegaly, splenomegaly or mass.      Tenderness: There is no abdominal tenderness.      Hernia: No hernia is present.   Musculoskeletal:      Right lower leg: No edema.      Left lower leg: No edema.   Lymphadenopathy:      Head:      Right side of head: No submental, submandibular, tonsillar, preauricular, posterior auricular or occipital adenopathy.      Left  side of head: No submental, submandibular, tonsillar, preauricular, posterior auricular or occipital adenopathy.      Cervical: No cervical adenopathy.      Upper Body:      Right upper body: No supraclavicular adenopathy.      Left upper body: No supraclavicular adenopathy.   Skin:     General: Skin is warm.      Coloration: Skin is not cyanotic, jaundiced or pale.      Findings: No rash.      Nails: There is no clubbing.   Neurological:      Mental Status: She is alert and oriented to person, place, and time.      Cranial Nerves: No dysarthria or facial asymmetry.      Sensory: No sensory deficit.      Motor: No tremor.      Coordination: Coordination normal.      Gait: Gait normal.   Psychiatric:         Attention and Perception: Attention normal.         Mood and Affect: Mood normal.         Speech: Speech normal.         Behavior: Behavior normal.         Thought Content: Thought content normal.       Assessment & Plan   Problems Addressed this Visit          Allergies and Adverse Reactions    Chronic seasonal allergic rhinitis  Continue current medication  Encouraged to report if any worse or if any new symptoms or concerns.       Cardiac and Vasculature    Essential hypertension   Hypertension: elevated today. Evidence of target organ damage: none.  Encouraged to continue to work on diet and exercise plan.   HCTZ 25 daily will be added    Relevant Medications    valsartan-hydrochlorothiazide (Diovan HCT) 160-25 MG per tablet    Other Relevant Orders    CBC & Differential (Completed)    Comprehensive Metabolic Panel (Completed)    TSH (Completed)    ECG 12 Lead    Mixed hyperlipidemia   As above.  Fasting labs drawn    Relevant Orders    Comprehensive Metabolic Panel (Completed)    Lipid Panel (Completed)    TSH (Completed)    Other chest pain  Advised to start on low-dose ASA daily  CTA coronary arteries will be arranged  Patient will be notified of the results and any further action to be taken.  She agrees  to ED evaluation if any further symptoms    Relevant Medications    aspirin 81 MG EC tablet    Other Relevant Orders    CBC & Differential (Completed)    ECG 12 Lead    CT Angiogram Coronary    CT Angiogram Coronary-Cardiology Interpretation       Endocrine and Metabolic    B12 deficiency  Continue supplementation with monitoring.    Relevant Orders    Vitamin B12 (Completed)    Vitamin D deficiency  As above.    Relevant Orders    Vitamin D,25-Hydroxy (Completed)       Gastrointestinal Abdominal     Chronic diarrhea  Chronic.  Stable.  Encouraged to report if this should change.    Gastroesophageal reflux disease without esophagitis   Symptoms are currently well controlled.  Continue current medication.       Health Encounters    Healthcare maintenance  Recommend RSV  Reminded to get a flu shot when available       Musculoskeletal and Injuries    Generalized osteoarthritis  Reminded regarding symptomatic treatment.   Continue current medication  Encouraged to report if any worse or if any new symptoms or concerns.    Relevant Medications    traMADol (ULTRAM) 50 MG tablet    History of traumatic subdural hematoma       Neuro    Migraine without aura and without status migrainosus, not intractable  Chronic.  Stable.  Encouraged to report if this should change.    Relevant Medications    aspirin 81 MG EC tablet    traMADol (ULTRAM) 50 MG tablet       Pulmonary and Pneumonias    Extrinsic asthma without complication  Mild persistent asthma. The patient is not currently having an exacerbation. In general, the patient's disease is well controlled.  Encouraged to report if this should change.  Continue current medication    Relevant Orders    CBC & Differential (Completed)     Diagnoses         Codes Comments      Mixed hyperlipidemia    -  Primary ICD-10-CM: E78.2  ICD-9-CM: 272.2       Essential hypertension     ICD-10-CM: I10  ICD-9-CM: 401.9       Healthcare maintenance     ICD-10-CM: Z00.00  ICD-9-CM: V70.0        Moderate persistent extrinsic asthma without complication     ICD-10-CM: J45.40  ICD-9-CM: 493.00       Other chest pain     ICD-10-CM: R07.89  ICD-9-CM: 786.59       Vitamin D deficiency     ICD-10-CM: E55.9  ICD-9-CM: 268.9       B12 deficiency     ICD-10-CM: E53.8  ICD-9-CM: 266.2       Gastroesophageal reflux disease without esophagitis     ICD-10-CM: K21.9  ICD-9-CM: 530.81       Generalized osteoarthritis     ICD-10-CM: M15.9  ICD-9-CM: 715.00       Chronic seasonal allergic rhinitis     ICD-10-CM: J30.2  ICD-9-CM: 477.8       Chronic diarrhea     ICD-10-CM: K52.9  ICD-9-CM: 787.91       History of traumatic subdural hematoma     ICD-10-CM: Z87.828  ICD-9-CM: V12.59       Migraine without aura and without status migrainosus, not intractable     ICD-10-CM: G43.009  ICD-9-CM: 346.10

## 2025-07-16 VITALS
HEIGHT: 62 IN | WEIGHT: 129 LBS | RESPIRATION RATE: 14 BRPM | DIASTOLIC BLOOD PRESSURE: 94 MMHG | SYSTOLIC BLOOD PRESSURE: 160 MMHG | OXYGEN SATURATION: 97 % | BODY MASS INDEX: 23.74 KG/M2 | HEART RATE: 82 BPM | TEMPERATURE: 98.6 F

## 2025-07-16 DIAGNOSIS — E55.9 VITAMIN D DEFICIENCY: Primary | ICD-10-CM

## 2025-07-16 LAB
25(OH)D3 SERPL-MCNC: 22.5 NG/ML (ref 30–100)
VIT B12 BLD-MCNC: 382 PG/ML (ref 211–946)

## 2025-07-16 RX ORDER — ERGOCALCIFEROL 1.25 MG/1
50000 CAPSULE, LIQUID FILLED ORAL WEEKLY
Qty: 12 CAPSULE | Refills: 1 | Status: SHIPPED | OUTPATIENT
Start: 2025-07-16

## 2025-07-16 NOTE — PROGRESS NOTES
Adult ECG Report     Name: Farzana Hawk   Age: 80 y.o.   Gender: female       Rate: 72   Rhythm: normal sinus rhythm   QRS Axis: normal   OR Interval: normal   QRS Duration: normal   QTc: normal   Voltages: normal   Conduction Disturbances: none   Other Abnormalities: none     Narrative Interpretation: NSR 72 with a normal axis and intervals.  No significant abnormalities noted.  No changes from an EKG performed on 11/18/2019

## 2025-08-12 ENCOUNTER — HOSPITAL ENCOUNTER (OUTPATIENT)
Dept: CT IMAGING | Facility: HOSPITAL | Age: 80
Discharge: HOME | End: 2025-08-12
Admitting: GENERAL PRACTICE
Payer: MEDICARE

## 2025-08-12 DIAGNOSIS — R07.89 OTHER CHEST PAIN: ICD-10-CM

## 2025-08-12 DIAGNOSIS — N17.9 ACUTE RENAL FAILURE, UNSPECIFIED ACUTE RENAL FAILURE TYPE: Primary | ICD-10-CM

## 2025-08-12 LAB — CREAT BLDA-MCNC: 1.5 MG/DL (ref 0.6–1.3)

## 2025-08-12 PROCEDURE — 75571 CT HRT W/O DYE W/CA TEST: CPT

## 2025-08-12 PROCEDURE — 82565 ASSAY OF CREATININE: CPT

## 2025-08-13 ENCOUNTER — LAB (OUTPATIENT)
Dept: FAMILY MEDICINE CLINIC | Facility: CLINIC | Age: 80
End: 2025-08-13
Payer: MEDICARE

## 2025-08-13 ENCOUNTER — TELEPHONE (OUTPATIENT)
Dept: FAMILY MEDICINE CLINIC | Facility: CLINIC | Age: 80
End: 2025-08-13
Payer: MEDICARE

## 2025-08-13 DIAGNOSIS — N17.9 ACUTE RENAL FAILURE, UNSPECIFIED ACUTE RENAL FAILURE TYPE: ICD-10-CM

## 2025-08-13 LAB
ALBUMIN SERPL-MCNC: 4 G/DL (ref 3.5–5.2)
ALBUMIN UR-MCNC: 2.1 MG/DL
ALBUMIN/GLOB SERPL: 1.5 G/DL
ALP SERPL-CCNC: 65 U/L (ref 39–117)
ALT SERPL W P-5'-P-CCNC: 5 U/L (ref 1–33)
ANION GAP SERPL CALCULATED.3IONS-SCNC: 10 MMOL/L (ref 5–15)
AST SERPL-CCNC: 13 U/L (ref 1–32)
BACTERIA UR QL AUTO: ABNORMAL /HPF
BASOPHILS # BLD AUTO: 0.06 10*3/MM3 (ref 0–0.2)
BASOPHILS NFR BLD AUTO: 1.1 % (ref 0–1.5)
BILIRUB SERPL-MCNC: 0.4 MG/DL (ref 0–1.2)
BILIRUB UR QL STRIP: NEGATIVE
BUN SERPL-MCNC: 27 MG/DL (ref 8–23)
BUN/CREAT SERPL: 19.6 (ref 7–25)
CALCIUM SPEC-SCNC: 8.9 MG/DL (ref 8.6–10.5)
CHLORIDE SERPL-SCNC: 105 MMOL/L (ref 98–107)
CLARITY UR: CLEAR
CO2 SERPL-SCNC: 24 MMOL/L (ref 22–29)
COLOR UR: YELLOW
CREAT SERPL-MCNC: 1.38 MG/DL (ref 0.57–1)
CREAT UR-MCNC: 113.1 MG/DL
CREAT UR-MCNC: 113.1 MG/DL
DEPRECATED RDW RBC AUTO: 45.2 FL (ref 37–54)
EGFRCR SERPLBLD CKD-EPI 2021: 38.8 ML/MIN/1.73
EOSINOPHIL # BLD AUTO: 0.31 10*3/MM3 (ref 0–0.4)
EOSINOPHIL NFR BLD AUTO: 5.9 % (ref 0.3–6.2)
ERYTHROCYTE [DISTWIDTH] IN BLOOD BY AUTOMATED COUNT: 13.5 % (ref 12.3–15.4)
GLOBULIN UR ELPH-MCNC: 2.7 GM/DL
GLUCOSE SERPL-MCNC: 90 MG/DL (ref 65–99)
GLUCOSE UR STRIP-MCNC: NEGATIVE MG/DL
HCT VFR BLD AUTO: 35.8 % (ref 34–46.6)
HGB BLD-MCNC: 11.8 G/DL (ref 12–15.9)
HGB UR QL STRIP.AUTO: ABNORMAL
HYALINE CASTS UR QL AUTO: ABNORMAL /LPF
IMM GRANULOCYTES # BLD AUTO: 0.01 10*3/MM3 (ref 0–0.05)
IMM GRANULOCYTES NFR BLD AUTO: 0.2 % (ref 0–0.5)
KETONES UR QL STRIP: NEGATIVE
LEUKOCYTE ESTERASE UR QL STRIP.AUTO: NEGATIVE
LYMPHOCYTES # BLD AUTO: 1.74 10*3/MM3 (ref 0.7–3.1)
LYMPHOCYTES NFR BLD AUTO: 33.3 % (ref 19.6–45.3)
MCH RBC QN AUTO: 30.3 PG (ref 26.6–33)
MCHC RBC AUTO-ENTMCNC: 33 G/DL (ref 31.5–35.7)
MCV RBC AUTO: 92 FL (ref 79–97)
MICROALBUMIN/CREAT UR: 18.6 MG/G (ref 0–29)
MONOCYTES # BLD AUTO: 0.39 10*3/MM3 (ref 0.1–0.9)
MONOCYTES NFR BLD AUTO: 7.5 % (ref 5–12)
NEUTROPHILS NFR BLD AUTO: 2.72 10*3/MM3 (ref 1.7–7)
NEUTROPHILS NFR BLD AUTO: 52 % (ref 42.7–76)
NITRITE UR QL STRIP: NEGATIVE
NRBC BLD AUTO-RTO: 0 /100 WBC (ref 0–0.2)
PH UR STRIP.AUTO: 5.5 [PH] (ref 5–8)
PLATELET # BLD AUTO: 196 10*3/MM3 (ref 140–450)
PMV BLD AUTO: 11.4 FL (ref 6–12)
POTASSIUM SERPL-SCNC: 4.2 MMOL/L (ref 3.5–5.2)
PROT ?TM UR-MCNC: 11 MG/DL
PROT SERPL-MCNC: 6.7 G/DL (ref 6–8.5)
PROT UR QL STRIP: ABNORMAL
PROT/CREAT UR: 97.3 MG/G CREA (ref 0–200)
RBC # BLD AUTO: 3.89 10*6/MM3 (ref 3.77–5.28)
RBC # UR STRIP: ABNORMAL /HPF
REF LAB TEST METHOD: ABNORMAL
SODIUM SERPL-SCNC: 139 MMOL/L (ref 136–145)
SP GR UR STRIP: 1.02 (ref 1–1.03)
SQUAMOUS #/AREA URNS HPF: ABNORMAL /HPF
UROBILINOGEN UR QL STRIP: ABNORMAL
WBC # UR STRIP: ABNORMAL /HPF
WBC NRBC COR # BLD AUTO: 5.23 10*3/MM3 (ref 3.4–10.8)

## 2025-08-13 PROCEDURE — 81001 URINALYSIS AUTO W/SCOPE: CPT | Performed by: GENERAL PRACTICE

## 2025-08-13 PROCEDURE — 85025 COMPLETE CBC W/AUTO DIFF WBC: CPT | Performed by: GENERAL PRACTICE

## 2025-08-13 PROCEDURE — 82570 ASSAY OF URINE CREATININE: CPT | Performed by: GENERAL PRACTICE

## 2025-08-13 PROCEDURE — 36415 COLL VENOUS BLD VENIPUNCTURE: CPT | Performed by: GENERAL PRACTICE

## 2025-08-13 PROCEDURE — 82043 UR ALBUMIN QUANTITATIVE: CPT | Performed by: GENERAL PRACTICE

## 2025-08-13 PROCEDURE — 84156 ASSAY OF PROTEIN URINE: CPT | Performed by: GENERAL PRACTICE

## 2025-08-13 PROCEDURE — 80053 COMPREHEN METABOLIC PANEL: CPT | Performed by: GENERAL PRACTICE

## 2025-08-14 DIAGNOSIS — N17.9 ACUTE RENAL FAILURE, UNSPECIFIED ACUTE RENAL FAILURE TYPE: ICD-10-CM

## 2025-08-14 DIAGNOSIS — N28.9 RENAL INSUFFICIENCY: Primary | ICD-10-CM

## 2025-08-22 ENCOUNTER — OFFICE VISIT (OUTPATIENT)
Dept: FAMILY MEDICINE CLINIC | Facility: CLINIC | Age: 80
End: 2025-08-22
Payer: MEDICARE

## 2025-08-22 DIAGNOSIS — I10 ESSENTIAL HYPERTENSION: ICD-10-CM

## 2025-08-22 DIAGNOSIS — Z79.899 ENCOUNTER FOR LONG-TERM (CURRENT) USE OF HIGH-RISK MEDICATION: ICD-10-CM

## 2025-08-22 DIAGNOSIS — Z51.81 ENCOUNTER FOR THERAPEUTIC DRUG LEVEL MONITORING: Primary | ICD-10-CM

## 2025-08-22 DIAGNOSIS — F33.41 RECURRENT MAJOR DEPRESSIVE DISORDER, IN PARTIAL REMISSION: ICD-10-CM

## 2025-08-22 DIAGNOSIS — K52.9 CHRONIC DIARRHEA: ICD-10-CM

## 2025-08-22 DIAGNOSIS — J45.40 MODERATE PERSISTENT EXTRINSIC ASTHMA WITHOUT COMPLICATION: ICD-10-CM

## 2025-08-22 DIAGNOSIS — K21.9 GASTROESOPHAGEAL REFLUX DISEASE WITHOUT ESOPHAGITIS: ICD-10-CM

## 2025-08-22 DIAGNOSIS — G43.009 MIGRAINE WITHOUT AURA AND WITHOUT STATUS MIGRAINOSUS, NOT INTRACTABLE: ICD-10-CM

## 2025-08-22 DIAGNOSIS — R07.89 OTHER CHEST PAIN: ICD-10-CM

## 2025-08-22 DIAGNOSIS — M54.59 MECHANICAL LOW BACK PAIN: ICD-10-CM

## 2025-08-22 DIAGNOSIS — E55.9 VITAMIN D DEFICIENCY: ICD-10-CM

## 2025-08-22 DIAGNOSIS — N17.9 ACUTE RENAL FAILURE, UNSPECIFIED ACUTE RENAL FAILURE TYPE: ICD-10-CM

## 2025-08-22 DIAGNOSIS — M85.89 OSTEOPENIA OF MULTIPLE SITES: ICD-10-CM

## 2025-08-22 DIAGNOSIS — E53.8 B12 DEFICIENCY: ICD-10-CM

## 2025-08-22 DIAGNOSIS — Z87.828 HISTORY OF TRAUMATIC SUBDURAL HEMATOMA: ICD-10-CM

## 2025-08-22 DIAGNOSIS — J30.2 CHRONIC SEASONAL ALLERGIC RHINITIS: ICD-10-CM

## 2025-08-22 DIAGNOSIS — E78.2 MIXED HYPERLIPIDEMIA: ICD-10-CM

## 2025-08-22 DIAGNOSIS — M15.9 GENERALIZED OSTEOARTHRITIS: ICD-10-CM

## 2025-08-22 DIAGNOSIS — Z00.00 HEALTHCARE MAINTENANCE: ICD-10-CM

## 2025-08-22 PROCEDURE — 84165 PROTEIN E-PHORESIS SERUM: CPT | Performed by: GENERAL PRACTICE

## 2025-08-22 PROCEDURE — 87340 HEPATITIS B SURFACE AG IA: CPT | Performed by: GENERAL PRACTICE

## 2025-08-22 PROCEDURE — 85652 RBC SED RATE AUTOMATED: CPT | Performed by: GENERAL PRACTICE

## 2025-08-22 PROCEDURE — 86334 IMMUNOFIX E-PHORESIS SERUM: CPT | Performed by: GENERAL PRACTICE

## 2025-08-22 PROCEDURE — 83521 IG LIGHT CHAINS FREE EACH: CPT | Performed by: GENERAL PRACTICE

## 2025-08-22 PROCEDURE — 82784 ASSAY IGA/IGD/IGG/IGM EACH: CPT | Performed by: GENERAL PRACTICE

## 2025-08-22 PROCEDURE — 86803 HEPATITIS C AB TEST: CPT | Performed by: GENERAL PRACTICE

## 2025-08-22 PROCEDURE — 80053 COMPREHEN METABOLIC PANEL: CPT | Performed by: GENERAL PRACTICE

## 2025-08-22 PROCEDURE — 86706 HEP B SURFACE ANTIBODY: CPT | Performed by: GENERAL PRACTICE

## 2025-08-23 VITALS
DIASTOLIC BLOOD PRESSURE: 62 MMHG | HEART RATE: 75 BPM | OXYGEN SATURATION: 97 % | SYSTOLIC BLOOD PRESSURE: 122 MMHG | WEIGHT: 125 LBS | RESPIRATION RATE: 14 BRPM | BODY MASS INDEX: 23 KG/M2 | HEIGHT: 62 IN | TEMPERATURE: 97.8 F

## 2025-08-23 LAB
ALBUMIN SERPL-MCNC: 4.3 G/DL (ref 3.5–5.2)
ALBUMIN/GLOB SERPL: 1.3 G/DL
ALP SERPL-CCNC: 70 U/L (ref 39–117)
ALT SERPL W P-5'-P-CCNC: 8 U/L (ref 1–33)
ANION GAP SERPL CALCULATED.3IONS-SCNC: 11 MMOL/L (ref 5–15)
AST SERPL-CCNC: 18 U/L (ref 1–32)
BILIRUB SERPL-MCNC: 0.7 MG/DL (ref 0–1.2)
BUN SERPL-MCNC: 18 MG/DL (ref 8–23)
BUN/CREAT SERPL: 14.1 (ref 7–25)
CALCIUM SPEC-SCNC: 10 MG/DL (ref 8.6–10.5)
CHLORIDE SERPL-SCNC: 103 MMOL/L (ref 98–107)
CO2 SERPL-SCNC: 23 MMOL/L (ref 22–29)
CREAT SERPL-MCNC: 1.28 MG/DL (ref 0.57–1)
EGFRCR SERPLBLD CKD-EPI 2021: 42.4 ML/MIN/1.73
ERYTHROCYTE [SEDIMENTATION RATE] IN BLOOD: 33 MM/HR (ref 0–30)
GLOBULIN UR ELPH-MCNC: 3.2 GM/DL
GLUCOSE SERPL-MCNC: 88 MG/DL (ref 65–99)
HBV SURFACE AB SER RIA-ACNC: NORMAL
HBV SURFACE AG SERPL QL IA: NORMAL
HCV AB SER QL: NORMAL
POTASSIUM SERPL-SCNC: 5.1 MMOL/L (ref 3.5–5.2)
PROT SERPL-MCNC: 7.5 G/DL (ref 6–8.5)
SODIUM SERPL-SCNC: 137 MMOL/L (ref 136–145)

## 2025-08-23 RX ORDER — ALBUTEROL SULFATE 90 UG/1
2 INHALANT RESPIRATORY (INHALATION) EVERY 4 HOURS PRN
Qty: 54 G | Refills: 3 | Status: SHIPPED | OUTPATIENT
Start: 2025-08-23

## 2025-08-23 RX ORDER — LEVOCETIRIZINE DIHYDROCHLORIDE 5 MG/1
5 TABLET, FILM COATED ORAL DAILY
Qty: 90 TABLET | Refills: 3 | Status: SHIPPED | OUTPATIENT
Start: 2025-08-23

## 2025-08-23 RX ORDER — MONTELUKAST SODIUM 10 MG/1
10 TABLET ORAL DAILY
Qty: 90 TABLET | Refills: 3 | Status: SHIPPED | OUTPATIENT
Start: 2025-08-23

## 2025-08-23 RX ORDER — LANSOPRAZOLE 30 MG/1
30 CAPSULE, DELAYED RELEASE ORAL DAILY
Qty: 90 CAPSULE | Refills: 3 | Status: SHIPPED | OUTPATIENT
Start: 2025-08-23

## 2025-08-23 RX ORDER — ATORVASTATIN CALCIUM 40 MG/1
40 TABLET, FILM COATED ORAL NIGHTLY
Qty: 30 TABLET | Refills: 5 | Status: SHIPPED | OUTPATIENT
Start: 2025-08-23

## 2025-08-24 LAB
6-ACETYLMORPHINE: NOT DETECTED NG/ML
6MAM SERPLBLD-MCNC: NOT DETECTED NG/ML
7- AMINOCLONAZEPAM: NOT DETECTED NG/ML
9-DELTA-THC-COOH: NOT DETECTED NG/ML
ALCOHOL, ETHYL: NOT DETECTED MG/DL
ALPHA-HYDROXYALPRAZOLAM: NOT DETECTED NG/ML
ALPRAZ SPEC-MCNC: NOT DETECTED NG/ML
AMITRIP SERPL-MCNC: NOT DETECTED NG/ML
AMPHET SAL QL CFM: NOT DETECTED NG/ML
AMPHETAMINE: NOT DETECTED NG/ML
BENZODIAZ BLD QL: NOT DETECTED NG/ML
BUPRENORPHINE SAL CFM-MCNC: NOT DETECTED NG/ML
BUPRENORPHINE: NOT DETECTED NG/ML
BUTALBITAL: NOT DETECTED NG/ML
COCAINE METABOLITE: NOT DETECTED NG/ML
CODEINE: NOT DETECTED NG/ML
CREATININE: 58.5 MG/DL
DOXEPIN: NOT DETECTED NG/ML
EDDP SERPL QL: NOT DETECTED NG/ML
EDDP: NOT DETECTED NG/ML
FENTANYL SAL QL SCN: NOT DETECTED NG/ML
FENTANYL-EIA: NOT DETECTED NG/ML
HYDROCODONE: NOT DETECTED NG/ML
HYDROMORPHONE: NOT DETECTED NG/ML
LORAZEPAM: NOT DETECTED NG/ML
METHADONE: NOT DETECTED NG/ML
METHAMPHET/CREAT UR: NOT DETECTED NG/ML
METHAMPHETAMINE: NOT DETECTED NG/ML
MORPHINE: NOT DETECTED NG/ML
NORBUPRENORPHINE: NOT DETECTED NG/ML
NORDIAZEPAM: NOT DETECTED NG/ML
NORFENTANYL: NOT DETECTED NG/ML
NORHYDROCODONE: NOT DETECTED NG/ML
NORTRIPTYLINE: NOT DETECTED NG/ML
O-DESMETHYLTRAMADOL: >2500 NG/ML
OPIATES: NOT DETECTED NG/ML
OXAZEPAM: NOT DETECTED NG/ML
OXIDANTS: NOT DETECTED UG/ML
OXYCODONE: NOT DETECTED NG/ML
PCP SAL CFM-MCNC: NOT DETECTED NG/ML
PCP: NOT DETECTED NG/ML
PH: 5.1 (ref 4.5–9)
PHENOBARBITAL: NOT DETECTED NG/ML
PHENTERMINE: NOT DETECTED NG/ML
REF LAB TEST METHOD: NORMAL
SPECIFIC GRAVITY, QUAN: 1.01 (ref 1–1.03)
TEMAZEPAM: NOT DETECTED NG/ML
THC: NOT DETECTED NG/ML
TRAMADOL UR CFM-MCNC: >2500 NG/ML

## 2025-08-25 ENCOUNTER — HOSPITAL ENCOUNTER (OUTPATIENT)
Dept: ULTRASOUND IMAGING | Facility: HOSPITAL | Age: 80
Discharge: HOME | End: 2025-08-25
Admitting: GENERAL PRACTICE
Payer: MEDICARE

## 2025-08-25 DIAGNOSIS — N17.9 ACUTE RENAL FAILURE, UNSPECIFIED ACUTE RENAL FAILURE TYPE: ICD-10-CM

## 2025-08-25 PROCEDURE — 76700 US EXAM ABDOM COMPLETE: CPT

## 2025-08-25 PROCEDURE — 76700 US EXAM ABDOM COMPLETE: CPT | Performed by: RADIOLOGY

## 2025-08-26 LAB
ALBUMIN SERPL ELPH-MCNC: 3.4 G/DL (ref 2.9–4.4)
ALBUMIN/GLOB SERPL: 1 {RATIO} (ref 0.7–1.7)
ALPHA1 GLOB SERPL ELPH-MCNC: 0.4 G/DL (ref 0–0.4)
ALPHA2 GLOB SERPL ELPH-MCNC: 1 G/DL (ref 0.4–1)
B-GLOBULIN SERPL ELPH-MCNC: 1.3 G/DL (ref 0.7–1.3)
GAMMA GLOB SERPL ELPH-MCNC: 0.8 G/DL (ref 0.4–1.8)
GLOBULIN SER-MCNC: 3.6 G/DL (ref 2.2–3.9)
IGA SERPL-MCNC: 349 MG/DL (ref 64–422)
IGG SERPL-MCNC: 867 MG/DL (ref 586–1602)
IGM SERPL-MCNC: 34 MG/DL (ref 26–217)
INTERPRETATION SERPL IEP-IMP: ABNORMAL
KAPPA LC FREE SER-MCNC: 27.8 MG/L (ref 3.3–19.4)
KAPPA LC FREE/LAMBDA FREE SER: 1.45 {RATIO} (ref 0.26–1.65)
LABORATORY COMMENT REPORT: ABNORMAL
LAMBDA LC FREE SERPL-MCNC: 19.2 MG/L (ref 5.7–26.3)
M PROTEIN SERPL ELPH-MCNC: ABNORMAL G/DL
PROT SERPL-MCNC: 7 G/DL (ref 6–8.5)

## 2025-08-27 DIAGNOSIS — R07.89 OTHER CHEST PAIN: Primary | ICD-10-CM
